# Patient Record
Sex: FEMALE | Race: WHITE | Employment: OTHER | URBAN - METROPOLITAN AREA
[De-identification: names, ages, dates, MRNs, and addresses within clinical notes are randomized per-mention and may not be internally consistent; named-entity substitution may affect disease eponyms.]

---

## 2018-03-15 LAB
ALBUMIN SERPL-MCNC: NORMAL G/DL
ALP BLD-CCNC: NORMAL U/L
ALT SERPL-CCNC: NORMAL U/L
ANION GAP SERPL CALCULATED.3IONS-SCNC: NORMAL MMOL/L
AST SERPL-CCNC: NORMAL U/L
BILIRUB SERPL-MCNC: NORMAL MG/DL (ref 0.1–1.4)
BUN BLDV-MCNC: NORMAL MG/DL
CALCIUM SERPL-MCNC: NORMAL MG/DL
CHLORIDE BLD-SCNC: NORMAL MMOL/L
CHOLESTEROL, TOTAL: 2127 MG/DL
CHOLESTEROL/HDL RATIO: 4
CO2: NORMAL MMOL/L
CREAT SERPL-MCNC: NORMAL MG/DL
GFR CALCULATED: NORMAL
GLUCOSE BLD-MCNC: 103 MG/DL
HDLC SERPL-MCNC: 32 MG/DL (ref 35–70)
LDL CHOLESTEROL CALCULATED: 77 MG/DL (ref 0–160)
POTASSIUM SERPL-SCNC: NORMAL MMOL/L
SODIUM BLD-SCNC: NORMAL MMOL/L
TOTAL PROTEIN: NORMAL
TRIGL SERPL-MCNC: 95 MG/DL
VLDLC SERPL CALC-MCNC: ABNORMAL MG/DL

## 2018-03-21 ENCOUNTER — OFFICE VISIT (OUTPATIENT)
Dept: FAMILY MEDICINE CLINIC | Age: 78
End: 2018-03-21
Payer: MEDICARE

## 2018-03-21 VITALS
BODY MASS INDEX: 23.39 KG/M2 | RESPIRATION RATE: 16 BRPM | SYSTOLIC BLOOD PRESSURE: 128 MMHG | TEMPERATURE: 98.4 F | OXYGEN SATURATION: 96 % | DIASTOLIC BLOOD PRESSURE: 80 MMHG | HEIGHT: 63 IN | WEIGHT: 132 LBS | HEART RATE: 81 BPM

## 2018-03-21 DIAGNOSIS — F32.A DEPRESSION, UNSPECIFIED DEPRESSION TYPE: ICD-10-CM

## 2018-03-21 DIAGNOSIS — F32.9 REACTIVE DEPRESSION: ICD-10-CM

## 2018-03-21 DIAGNOSIS — F33.41 MAJOR DEPRESSIVE DISORDER, RECURRENT, IN PARTIAL REMISSION (HCC): ICD-10-CM

## 2018-03-21 DIAGNOSIS — E03.9 ACQUIRED HYPOTHYROIDISM: Primary | ICD-10-CM

## 2018-03-21 DIAGNOSIS — F41.9 ANXIETY: ICD-10-CM

## 2018-03-21 DIAGNOSIS — E78.5 HYPERLIPIDEMIA, UNSPECIFIED HYPERLIPIDEMIA TYPE: ICD-10-CM

## 2018-03-21 PROCEDURE — 1123F ACP DISCUSS/DSCN MKR DOCD: CPT | Performed by: FAMILY MEDICINE

## 2018-03-21 PROCEDURE — G8427 DOCREV CUR MEDS BY ELIG CLIN: HCPCS | Performed by: FAMILY MEDICINE

## 2018-03-21 PROCEDURE — 4040F PNEUMOC VAC/ADMIN/RCVD: CPT | Performed by: FAMILY MEDICINE

## 2018-03-21 PROCEDURE — G8400 PT W/DXA NO RESULTS DOC: HCPCS | Performed by: FAMILY MEDICINE

## 2018-03-21 PROCEDURE — 1090F PRES/ABSN URINE INCON ASSESS: CPT | Performed by: FAMILY MEDICINE

## 2018-03-21 PROCEDURE — 1036F TOBACCO NON-USER: CPT | Performed by: FAMILY MEDICINE

## 2018-03-21 PROCEDURE — G8482 FLU IMMUNIZE ORDER/ADMIN: HCPCS | Performed by: FAMILY MEDICINE

## 2018-03-21 PROCEDURE — 99214 OFFICE O/P EST MOD 30 MIN: CPT | Performed by: FAMILY MEDICINE

## 2018-03-21 PROCEDURE — G8420 CALC BMI NORM PARAMETERS: HCPCS | Performed by: FAMILY MEDICINE

## 2018-03-21 RX ORDER — CITALOPRAM 40 MG/1
40 TABLET ORAL DAILY
Qty: 90 TABLET | Refills: 1 | Status: SHIPPED | OUTPATIENT
Start: 2018-03-21 | End: 2018-09-12 | Stop reason: SDUPTHER

## 2018-03-21 RX ORDER — LEVOTHYROXINE SODIUM 0.03 MG/1
25 TABLET ORAL DAILY
Qty: 90 TABLET | Refills: 1 | Status: SHIPPED | OUTPATIENT
Start: 2018-03-21 | End: 2018-09-12 | Stop reason: SDUPTHER

## 2018-03-21 RX ORDER — ATORVASTATIN CALCIUM 20 MG/1
20 TABLET, FILM COATED ORAL NIGHTLY
Qty: 90 TABLET | Refills: 1 | Status: SHIPPED | OUTPATIENT
Start: 2018-03-21 | End: 2018-09-12 | Stop reason: SDUPTHER

## 2018-03-21 RX ORDER — ALPRAZOLAM 0.25 MG/1
0.25 TABLET ORAL 2 TIMES DAILY
Qty: 60 TABLET | Refills: 5 | Status: SHIPPED | OUTPATIENT
Start: 2018-03-21 | End: 2018-09-12 | Stop reason: SDUPTHER

## 2018-03-21 ASSESSMENT — PATIENT HEALTH QUESTIONNAIRE - PHQ9
SUM OF ALL RESPONSES TO PHQ9 QUESTIONS 1 & 2: 1
SUM OF ALL RESPONSES TO PHQ QUESTIONS 1-9: 1
1. LITTLE INTEREST OR PLEASURE IN DOING THINGS: 0
2. FEELING DOWN, DEPRESSED OR HOPELESS: 1

## 2018-03-23 DIAGNOSIS — E78.5 HYPERLIPIDEMIA, UNSPECIFIED HYPERLIPIDEMIA TYPE: ICD-10-CM

## 2018-03-23 DIAGNOSIS — E03.9 ACQUIRED HYPOTHYROIDISM: ICD-10-CM

## 2018-09-11 LAB
ALBUMIN SERPL-MCNC: 4.2 G/DL
ALP BLD-CCNC: 75 U/L
ALT SERPL-CCNC: 8 U/L
ANION GAP SERPL CALCULATED.3IONS-SCNC: NORMAL MMOL/L
AST SERPL-CCNC: 14 U/L
BASOPHILS ABSOLUTE: NORMAL /ΜL
BASOPHILS RELATIVE PERCENT: NORMAL %
BILIRUB SERPL-MCNC: 0.7 MG/DL (ref 0.1–1.4)
BUN BLDV-MCNC: 14 MG/DL
CALCIUM SERPL-MCNC: 9.1 MG/DL
CHLORIDE BLD-SCNC: 104 MMOL/L
CHOLESTEROL, TOTAL: 129 MG/DL
CHOLESTEROL/HDL RATIO: 3.9
CO2: 30 MMOL/L
CREAT SERPL-MCNC: 0.79 MG/DL
EOSINOPHILS ABSOLUTE: NORMAL /ΜL
EOSINOPHILS RELATIVE PERCENT: NORMAL %
GFR CALCULATED: NORMAL
GLUCOSE BLD-MCNC: 103 MG/DL
HCT VFR BLD CALC: 39.3 % (ref 36–46)
HDLC SERPL-MCNC: 33 MG/DL (ref 35–70)
HEMOGLOBIN: 13.5 G/DL (ref 12–16)
LDL CHOLESTEROL CALCULATED: 77 MG/DL (ref 0–160)
LYMPHOCYTES ABSOLUTE: NORMAL /ΜL
LYMPHOCYTES RELATIVE PERCENT: NORMAL %
MCH RBC QN AUTO: 27.1 PG
MCHC RBC AUTO-ENTMCNC: 34.3 G/DL
MCV RBC AUTO: 79.2 FL
MONOCYTES ABSOLUTE: NORMAL /ΜL
MONOCYTES RELATIVE PERCENT: NORMAL %
NEUTROPHILS ABSOLUTE: NORMAL /ΜL
NEUTROPHILS RELATIVE PERCENT: NORMAL %
PDW BLD-RTO: 15.3 %
PLATELET # BLD: 181 K/ΜL
PMV BLD AUTO: 9.2 FL
POTASSIUM SERPL-SCNC: 4.3 MMOL/L
RBC # BLD: 4.96 10^6/ΜL
SODIUM BLD-SCNC: 140 MMOL/L
TOTAL PROTEIN: 6.4
TRIGL SERPL-MCNC: 100 MG/DL
TSH SERPL DL<=0.05 MIU/L-ACNC: 2.25 UIU/ML
VLDLC SERPL CALC-MCNC: ABNORMAL MG/DL
WBC # BLD: 5.8 10^3/ML

## 2018-09-12 ENCOUNTER — OFFICE VISIT (OUTPATIENT)
Dept: FAMILY MEDICINE CLINIC | Age: 78
End: 2018-09-12
Payer: MEDICARE

## 2018-09-12 VITALS
HEIGHT: 63 IN | TEMPERATURE: 98.1 F | RESPIRATION RATE: 16 BRPM | BODY MASS INDEX: 23.74 KG/M2 | DIASTOLIC BLOOD PRESSURE: 80 MMHG | OXYGEN SATURATION: 93 % | HEART RATE: 64 BPM | SYSTOLIC BLOOD PRESSURE: 120 MMHG | WEIGHT: 134 LBS

## 2018-09-12 DIAGNOSIS — Z23 NEED FOR VACCINATION: Primary | ICD-10-CM

## 2018-09-12 DIAGNOSIS — Z00.00 ROUTINE GENERAL MEDICAL EXAMINATION AT A HEALTH CARE FACILITY: ICD-10-CM

## 2018-09-12 DIAGNOSIS — E03.9 ACQUIRED HYPOTHYROIDISM: ICD-10-CM

## 2018-09-12 DIAGNOSIS — F32.A DEPRESSION, UNSPECIFIED DEPRESSION TYPE: ICD-10-CM

## 2018-09-12 DIAGNOSIS — E78.5 HYPERLIPIDEMIA, UNSPECIFIED HYPERLIPIDEMIA TYPE: ICD-10-CM

## 2018-09-12 DIAGNOSIS — F41.9 ANXIETY: ICD-10-CM

## 2018-09-12 PROCEDURE — 90662 IIV NO PRSV INCREASED AG IM: CPT | Performed by: FAMILY MEDICINE

## 2018-09-12 PROCEDURE — G0439 PPPS, SUBSEQ VISIT: HCPCS | Performed by: PHYSICIAN ASSISTANT

## 2018-09-12 PROCEDURE — 4040F PNEUMOC VAC/ADMIN/RCVD: CPT | Performed by: PHYSICIAN ASSISTANT

## 2018-09-12 PROCEDURE — G0008 ADMIN INFLUENZA VIRUS VAC: HCPCS | Performed by: FAMILY MEDICINE

## 2018-09-12 PROCEDURE — 4040F PNEUMOC VAC/ADMIN/RCVD: CPT | Performed by: FAMILY MEDICINE

## 2018-09-12 PROCEDURE — G0444 DEPRESSION SCREEN ANNUAL: HCPCS | Performed by: FAMILY MEDICINE

## 2018-09-12 RX ORDER — CITALOPRAM 40 MG/1
40 TABLET ORAL DAILY
Qty: 90 TABLET | Refills: 1 | Status: SHIPPED | OUTPATIENT
Start: 2018-09-12 | End: 2019-03-12 | Stop reason: SDUPTHER

## 2018-09-12 RX ORDER — ALPRAZOLAM 0.25 MG/1
0.25 TABLET ORAL 2 TIMES DAILY
Qty: 60 TABLET | Refills: 0 | Status: SHIPPED | OUTPATIENT
Start: 2018-09-12 | End: 2018-09-12

## 2018-09-12 RX ORDER — ALPRAZOLAM 0.25 MG/1
TABLET ORAL
COMMUNITY
Start: 2018-09-01 | End: 2018-09-12 | Stop reason: SDUPTHER

## 2018-09-12 RX ORDER — ATORVASTATIN CALCIUM 20 MG/1
20 TABLET, FILM COATED ORAL NIGHTLY
Qty: 90 TABLET | Refills: 1 | Status: SHIPPED | OUTPATIENT
Start: 2018-09-12 | End: 2019-03-12 | Stop reason: SDUPTHER

## 2018-09-12 RX ORDER — ALPRAZOLAM 0.25 MG/1
0.25 TABLET ORAL 2 TIMES DAILY
Qty: 60 TABLET | Refills: 5 | Status: SHIPPED | OUTPATIENT
Start: 2018-09-12 | End: 2018-10-18 | Stop reason: SDUPTHER

## 2018-09-12 RX ORDER — LEVOTHYROXINE SODIUM 0.03 MG/1
25 TABLET ORAL DAILY
Qty: 90 TABLET | Refills: 1 | Status: SHIPPED | OUTPATIENT
Start: 2018-09-12 | End: 2019-03-12 | Stop reason: SDUPTHER

## 2018-09-12 ASSESSMENT — PATIENT HEALTH QUESTIONNAIRE - PHQ9
SUM OF ALL RESPONSES TO PHQ QUESTIONS 1-9: 1
SUM OF ALL RESPONSES TO PHQ QUESTIONS 1-9: 3

## 2018-09-12 ASSESSMENT — LIFESTYLE VARIABLES: HOW OFTEN DO YOU HAVE A DRINK CONTAINING ALCOHOL: 0

## 2018-09-12 ASSESSMENT — ANXIETY QUESTIONNAIRES: GAD7 TOTAL SCORE: 1

## 2018-09-12 NOTE — PROGRESS NOTES
Diabetes Father 79       CareTeam (Including outside providers/suppliers regularly involved in providing care):   Patient Care Team:  Timbo Ward MD as PCP - General (Family Medicine)  Timbo Ward MD as Consulting Physician (Family Medicine)    Wt Readings from Last 3 Encounters:   09/12/18 134 lb (60.8 kg)   03/21/18 132 lb (59.9 kg)   09/20/17 131 lb (59.4 kg)     Vitals:    09/12/18 1510   BP: 120/80   Pulse: 64   Resp: 16   Temp: 98.1 °F (36.7 °C)   SpO2: 93%   Weight: 134 lb (60.8 kg)   Height: 5' 3\" (1.6 m)     Body mass index is 23.74 kg/m². General Appearance: alert and oriented to person, place and time, well developed and well- nourished, in no acute distress  Skin: warm and dry, no rash or erythema  Head: normocephalic and atraumatic  Eyes: pupils equal, round, and reactive to light, extraocular eye movements intact, conjunctivae normal  ENT: tympanic membrane, external ear and ear canal normal bilaterally, nose without deformity, nasal mucosa and turbinates normal without polyps  Neck: supple and non-tender without mass, no thyromegaly or thyroid nodules, no cervical lymphadenopathy  Pulmonary/Chest: clear to auscultation bilaterally- no wheezes, rales or rhonchi, normal air movement, no respiratory distress  Cardiovascular: normal rate, regular rhythm, normal S1 and S2, no murmurs, rubs, clicks, or gallops, distal pulses intact, no carotid bruits  Abdomen: soft, non-tender, non-distended, normal bowel sounds, no masses or organomegaly  Extremities: no cyanosis, clubbing or edema  Musculoskeletal: normal range of motion, no joint swelling, deformity or tenderness  Neurologic: reflexes normal and symmetric, no cranial nerve deficit, gait, coordination and speech normal    Patient's complete Health Risk Assessment and screening values have been reviewed and are found in Flowsheets.  The following problems were reviewed today and where indicated follow up appointments were made and/or

## 2018-09-19 DIAGNOSIS — E78.5 HYPERLIPIDEMIA, UNSPECIFIED HYPERLIPIDEMIA TYPE: ICD-10-CM

## 2018-09-19 DIAGNOSIS — E03.9 ACQUIRED HYPOTHYROIDISM: ICD-10-CM

## 2018-10-18 DIAGNOSIS — F41.9 ANXIETY: ICD-10-CM

## 2018-10-19 RX ORDER — ALPRAZOLAM 0.25 MG/1
0.25 TABLET ORAL 2 TIMES DAILY
Qty: 60 TABLET | Refills: 5 | Status: SHIPPED | OUTPATIENT
Start: 2018-10-19 | End: 2018-11-18

## 2018-10-24 NOTE — TELEPHONE ENCOUNTER
The pharmacist called from [address] asking for refills, check with patient to see if she gets it called in or mailed to her
supervision/seated in chair

## 2019-03-07 LAB
ALBUMIN SERPL-MCNC: NORMAL G/DL
ALP BLD-CCNC: NORMAL U/L
ALT SERPL-CCNC: NORMAL U/L
ANION GAP SERPL CALCULATED.3IONS-SCNC: NORMAL MMOL/L
AST SERPL-CCNC: NORMAL U/L
BASOPHILS ABSOLUTE: NORMAL /ΜL
BASOPHILS RELATIVE PERCENT: NORMAL %
BILIRUB SERPL-MCNC: NORMAL MG/DL (ref 0.1–1.4)
BUN BLDV-MCNC: NORMAL MG/DL
CALCIUM SERPL-MCNC: NORMAL MG/DL
CHLORIDE BLD-SCNC: NORMAL MMOL/L
CHOLESTEROL, TOTAL: 134 MG/DL
CHOLESTEROL/HDL RATIO: 4.3
CO2: NORMAL MMOL/L
CREAT SERPL-MCNC: NORMAL MG/DL
EOSINOPHILS ABSOLUTE: NORMAL /ΜL
EOSINOPHILS RELATIVE PERCENT: NORMAL %
GFR CALCULATED: NORMAL
GLUCOSE BLD-MCNC: 109 MG/DL
HCT VFR BLD CALC: 41.2 % (ref 36–46)
HDLC SERPL-MCNC: 31 MG/DL (ref 35–70)
HEMOGLOBIN: 13.6 G/DL (ref 12–16)
LDL CHOLESTEROL CALCULATED: 82 MG/DL (ref 0–160)
LYMPHOCYTES ABSOLUTE: NORMAL /ΜL
LYMPHOCYTES RELATIVE PERCENT: NORMAL %
MCH RBC QN AUTO: NORMAL PG
MCHC RBC AUTO-ENTMCNC: NORMAL G/DL
MCV RBC AUTO: NORMAL FL
MONOCYTES ABSOLUTE: NORMAL /ΜL
MONOCYTES RELATIVE PERCENT: NORMAL %
NEUTROPHILS ABSOLUTE: NORMAL /ΜL
NEUTROPHILS RELATIVE PERCENT: NORMAL %
PDW BLD-RTO: NORMAL %
PLATELET # BLD: NORMAL K/ΜL
PMV BLD AUTO: NORMAL FL
POTASSIUM SERPL-SCNC: NORMAL MMOL/L
RBC # BLD: NORMAL 10^6/ΜL
SODIUM BLD-SCNC: NORMAL MMOL/L
TOTAL PROTEIN: NORMAL
TRIGL SERPL-MCNC: 109 MG/DL
TSH SERPL DL<=0.05 MIU/L-ACNC: 3.03 UIU/ML
VLDLC SERPL CALC-MCNC: ABNORMAL MG/DL
WBC # BLD: NORMAL 10^3/ML

## 2019-03-12 ENCOUNTER — OFFICE VISIT (OUTPATIENT)
Dept: FAMILY MEDICINE CLINIC | Age: 79
End: 2019-03-12
Payer: MEDICARE

## 2019-03-12 VITALS
WEIGHT: 135.8 LBS | DIASTOLIC BLOOD PRESSURE: 62 MMHG | OXYGEN SATURATION: 97 % | SYSTOLIC BLOOD PRESSURE: 110 MMHG | RESPIRATION RATE: 16 BRPM | HEART RATE: 86 BPM | BODY MASS INDEX: 24.99 KG/M2 | HEIGHT: 62 IN | TEMPERATURE: 97.6 F

## 2019-03-12 DIAGNOSIS — F41.9 ANXIETY: ICD-10-CM

## 2019-03-12 DIAGNOSIS — E78.5 HYPERLIPIDEMIA, UNSPECIFIED HYPERLIPIDEMIA TYPE: ICD-10-CM

## 2019-03-12 DIAGNOSIS — E03.9 ACQUIRED HYPOTHYROIDISM: Primary | ICD-10-CM

## 2019-03-12 DIAGNOSIS — L82.1 SEBORRHEIC KERATOSIS: ICD-10-CM

## 2019-03-12 DIAGNOSIS — F32.A DEPRESSION, UNSPECIFIED DEPRESSION TYPE: ICD-10-CM

## 2019-03-12 PROCEDURE — G8427 DOCREV CUR MEDS BY ELIG CLIN: HCPCS | Performed by: FAMILY MEDICINE

## 2019-03-12 PROCEDURE — G8482 FLU IMMUNIZE ORDER/ADMIN: HCPCS | Performed by: FAMILY MEDICINE

## 2019-03-12 PROCEDURE — G8420 CALC BMI NORM PARAMETERS: HCPCS | Performed by: FAMILY MEDICINE

## 2019-03-12 PROCEDURE — 4040F PNEUMOC VAC/ADMIN/RCVD: CPT | Performed by: FAMILY MEDICINE

## 2019-03-12 PROCEDURE — 1123F ACP DISCUSS/DSCN MKR DOCD: CPT | Performed by: FAMILY MEDICINE

## 2019-03-12 PROCEDURE — G8400 PT W/DXA NO RESULTS DOC: HCPCS | Performed by: FAMILY MEDICINE

## 2019-03-12 PROCEDURE — 99214 OFFICE O/P EST MOD 30 MIN: CPT | Performed by: FAMILY MEDICINE

## 2019-03-12 PROCEDURE — 1036F TOBACCO NON-USER: CPT | Performed by: FAMILY MEDICINE

## 2019-03-12 PROCEDURE — 1101F PT FALLS ASSESS-DOCD LE1/YR: CPT | Performed by: FAMILY MEDICINE

## 2019-03-12 PROCEDURE — 1090F PRES/ABSN URINE INCON ASSESS: CPT | Performed by: FAMILY MEDICINE

## 2019-03-12 RX ORDER — IBUPROFEN 600 MG/1
600 TABLET ORAL EVERY 6 HOURS PRN
Qty: 60 TABLET | Refills: 1 | Status: SHIPPED
Start: 2019-03-12 | End: 2021-01-01

## 2019-03-12 RX ORDER — LEVOTHYROXINE SODIUM 0.03 MG/1
25 TABLET ORAL DAILY
Qty: 90 TABLET | Refills: 1 | Status: SHIPPED | OUTPATIENT
Start: 2019-03-12 | End: 2019-10-07 | Stop reason: SDUPTHER

## 2019-03-12 RX ORDER — ATORVASTATIN CALCIUM 20 MG/1
20 TABLET, FILM COATED ORAL NIGHTLY
Qty: 90 TABLET | Refills: 1 | Status: SHIPPED | OUTPATIENT
Start: 2019-03-12 | End: 2019-10-07 | Stop reason: SDUPTHER

## 2019-03-12 RX ORDER — ALPRAZOLAM 0.25 MG/1
0.25 TABLET ORAL
COMMUNITY
Start: 2019-01-04 | End: 2019-03-12 | Stop reason: SDUPTHER

## 2019-03-12 RX ORDER — CITALOPRAM 40 MG/1
40 TABLET ORAL DAILY
Qty: 90 TABLET | Refills: 1 | Status: SHIPPED | OUTPATIENT
Start: 2019-03-12 | End: 2019-10-07 | Stop reason: SDUPTHER

## 2019-03-12 RX ORDER — ALPRAZOLAM 0.25 MG/1
0.25 TABLET ORAL 2 TIMES DAILY
Qty: 60 TABLET | Refills: 2 | Status: SHIPPED | OUTPATIENT
Start: 2019-03-12 | End: 2019-10-07 | Stop reason: SDUPTHER

## 2019-04-11 DIAGNOSIS — F32.A DEPRESSION, UNSPECIFIED DEPRESSION TYPE: ICD-10-CM

## 2019-04-11 DIAGNOSIS — E03.9 ACQUIRED HYPOTHYROIDISM: ICD-10-CM

## 2019-04-11 DIAGNOSIS — E78.5 HYPERLIPIDEMIA, UNSPECIFIED HYPERLIPIDEMIA TYPE: ICD-10-CM

## 2019-09-16 ENCOUNTER — TELEPHONE (OUTPATIENT)
Dept: FAMILY MEDICINE CLINIC | Age: 79
End: 2019-09-16

## 2019-09-16 DIAGNOSIS — E78.5 HYPERLIPIDEMIA, UNSPECIFIED HYPERLIPIDEMIA TYPE: ICD-10-CM

## 2019-09-16 DIAGNOSIS — E03.9 ACQUIRED HYPOTHYROIDISM: Primary | ICD-10-CM

## 2019-10-02 LAB
ALBUMIN SERPL-MCNC: NORMAL G/DL
ALP BLD-CCNC: NORMAL U/L
ALT SERPL-CCNC: NORMAL U/L
ANION GAP SERPL CALCULATED.3IONS-SCNC: NORMAL MMOL/L
AST SERPL-CCNC: NORMAL U/L
BASOPHILS ABSOLUTE: NORMAL /ΜL
BASOPHILS RELATIVE PERCENT: NORMAL %
BILIRUB SERPL-MCNC: NORMAL MG/DL (ref 0.1–1.4)
BUN BLDV-MCNC: NORMAL MG/DL
CALCIUM SERPL-MCNC: NORMAL MG/DL
CHLORIDE BLD-SCNC: NORMAL MMOL/L
CHOLESTEROL, TOTAL: 135 MG/DL
CHOLESTEROL/HDL RATIO: 4
CO2: NORMAL MMOL/L
CREAT SERPL-MCNC: NORMAL MG/DL
EOSINOPHILS ABSOLUTE: NORMAL /ΜL
EOSINOPHILS RELATIVE PERCENT: NORMAL %
GFR CALCULATED: NORMAL
GLUCOSE BLD-MCNC: 105 MG/DL
HCT VFR BLD CALC: 41.2 % (ref 36–46)
HDLC SERPL-MCNC: 34 MG/DL (ref 35–70)
HEMOGLOBIN: 13.3 G/DL (ref 12–16)
LDL CHOLESTEROL CALCULATED: 80 MG/DL (ref 0–160)
LYMPHOCYTES ABSOLUTE: NORMAL /ΜL
LYMPHOCYTES RELATIVE PERCENT: NORMAL %
MCH RBC QN AUTO: NORMAL PG
MCHC RBC AUTO-ENTMCNC: NORMAL G/DL
MCV RBC AUTO: NORMAL FL
MONOCYTES ABSOLUTE: NORMAL /ΜL
MONOCYTES RELATIVE PERCENT: NORMAL %
NEUTROPHILS ABSOLUTE: NORMAL /ΜL
NEUTROPHILS RELATIVE PERCENT: NORMAL %
PDW BLD-RTO: NORMAL %
PLATELET # BLD: NORMAL K/ΜL
PMV BLD AUTO: NORMAL FL
POTASSIUM SERPL-SCNC: NORMAL MMOL/L
RBC # BLD: NORMAL 10^6/ΜL
SODIUM BLD-SCNC: NORMAL MMOL/L
TOTAL PROTEIN: NORMAL
TRIGL SERPL-MCNC: 113 MG/DL
TSH SERPL DL<=0.05 MIU/L-ACNC: 2.59 UIU/ML
VLDLC SERPL CALC-MCNC: ABNORMAL MG/DL
WBC # BLD: NORMAL 10^3/ML

## 2019-10-07 ENCOUNTER — OFFICE VISIT (OUTPATIENT)
Dept: FAMILY MEDICINE CLINIC | Age: 79
End: 2019-10-07
Payer: MEDICARE

## 2019-10-07 VITALS
OXYGEN SATURATION: 96 % | BODY MASS INDEX: 23.96 KG/M2 | HEIGHT: 63 IN | HEART RATE: 84 BPM | SYSTOLIC BLOOD PRESSURE: 130 MMHG | TEMPERATURE: 98.4 F | DIASTOLIC BLOOD PRESSURE: 88 MMHG | RESPIRATION RATE: 18 BRPM | WEIGHT: 135.2 LBS

## 2019-10-07 DIAGNOSIS — F41.9 ANXIETY: ICD-10-CM

## 2019-10-07 DIAGNOSIS — E78.5 HYPERLIPIDEMIA, UNSPECIFIED HYPERLIPIDEMIA TYPE: ICD-10-CM

## 2019-10-07 DIAGNOSIS — F43.21 GRIEF REACTION: ICD-10-CM

## 2019-10-07 DIAGNOSIS — E03.9 ACQUIRED HYPOTHYROIDISM: ICD-10-CM

## 2019-10-07 DIAGNOSIS — F32.A DEPRESSION, UNSPECIFIED DEPRESSION TYPE: ICD-10-CM

## 2019-10-07 DIAGNOSIS — Z00.00 ROUTINE GENERAL MEDICAL EXAMINATION AT A HEALTH CARE FACILITY: Primary | ICD-10-CM

## 2019-10-07 PROCEDURE — 90653 IIV ADJUVANT VACCINE IM: CPT | Performed by: FAMILY MEDICINE

## 2019-10-07 PROCEDURE — G0008 ADMIN INFLUENZA VIRUS VAC: HCPCS | Performed by: FAMILY MEDICINE

## 2019-10-07 PROCEDURE — G0439 PPPS, SUBSEQ VISIT: HCPCS | Performed by: FAMILY MEDICINE

## 2019-10-07 PROCEDURE — 4040F PNEUMOC VAC/ADMIN/RCVD: CPT | Performed by: FAMILY MEDICINE

## 2019-10-07 PROCEDURE — 1123F ACP DISCUSS/DSCN MKR DOCD: CPT | Performed by: FAMILY MEDICINE

## 2019-10-07 PROCEDURE — G8482 FLU IMMUNIZE ORDER/ADMIN: HCPCS | Performed by: FAMILY MEDICINE

## 2019-10-07 RX ORDER — CITALOPRAM 40 MG/1
40 TABLET ORAL DAILY
Qty: 90 TABLET | Refills: 1 | Status: SHIPPED
Start: 2019-10-07 | End: 2020-04-06 | Stop reason: SDUPTHER

## 2019-10-07 RX ORDER — LEVOTHYROXINE SODIUM 0.03 MG/1
25 TABLET ORAL DAILY
Qty: 90 TABLET | Refills: 1 | Status: SHIPPED
Start: 2019-10-07 | End: 2020-04-06 | Stop reason: SDUPTHER

## 2019-10-07 RX ORDER — ATORVASTATIN CALCIUM 20 MG/1
20 TABLET, FILM COATED ORAL NIGHTLY
Qty: 90 TABLET | Refills: 1 | Status: SHIPPED
Start: 2019-10-07 | End: 2020-04-06 | Stop reason: SDUPTHER

## 2019-10-07 RX ORDER — ALPRAZOLAM 0.25 MG/1
0.25 TABLET ORAL 2 TIMES DAILY
Qty: 60 TABLET | Refills: 2 | Status: SHIPPED | OUTPATIENT
Start: 2019-10-07 | End: 2020-04-06 | Stop reason: SDUPTHER

## 2019-10-07 ASSESSMENT — PATIENT HEALTH QUESTIONNAIRE - PHQ9
SUM OF ALL RESPONSES TO PHQ QUESTIONS 1-9: 2
SUM OF ALL RESPONSES TO PHQ QUESTIONS 1-9: 2

## 2019-10-07 ASSESSMENT — LIFESTYLE VARIABLES: HOW OFTEN DO YOU HAVE A DRINK CONTAINING ALCOHOL: 0

## 2019-10-17 DIAGNOSIS — E03.9 ACQUIRED HYPOTHYROIDISM: ICD-10-CM

## 2019-10-17 DIAGNOSIS — E78.5 HYPERLIPIDEMIA, UNSPECIFIED HYPERLIPIDEMIA TYPE: ICD-10-CM

## 2020-04-06 RX ORDER — ALPRAZOLAM 0.25 MG/1
0.25 TABLET ORAL 2 TIMES DAILY
Qty: 60 TABLET | Refills: 2 | Status: SHIPPED | OUTPATIENT
Start: 2020-04-06 | End: 2020-04-21 | Stop reason: SDUPTHER

## 2020-04-06 RX ORDER — LEVOTHYROXINE SODIUM 0.03 MG/1
25 TABLET ORAL DAILY
Qty: 90 TABLET | Refills: 1 | Status: SHIPPED
Start: 2020-04-06 | End: 2020-10-12 | Stop reason: SDUPTHER

## 2020-04-06 RX ORDER — ATORVASTATIN CALCIUM 20 MG/1
20 TABLET, FILM COATED ORAL NIGHTLY
Qty: 90 TABLET | Refills: 1 | Status: SHIPPED
Start: 2020-04-06 | End: 2020-10-12 | Stop reason: SDUPTHER

## 2020-04-06 RX ORDER — CITALOPRAM 40 MG/1
40 TABLET ORAL DAILY
Qty: 90 TABLET | Refills: 1 | Status: SHIPPED
Start: 2020-04-06 | End: 2020-10-12 | Stop reason: SDUPTHER

## 2020-04-21 RX ORDER — ALPRAZOLAM 0.25 MG/1
0.25 TABLET ORAL 2 TIMES DAILY
Qty: 60 TABLET | Refills: 2 | Status: SHIPPED
Start: 2020-04-21 | End: 2020-10-12 | Stop reason: SDUPTHER

## 2020-04-21 NOTE — TELEPHONE ENCOUNTER
Refill request for Xanax, pt did not get previous script. Tasia Halo it was printed by [facility] on 4/6/20.

## 2020-10-06 ENCOUNTER — TELEPHONE (OUTPATIENT)
Dept: FAMILY MEDICINE CLINIC | Age: 80
End: 2020-10-06

## 2020-10-07 LAB
ALBUMIN SERPL-MCNC: NORMAL G/DL
ALP BLD-CCNC: NORMAL U/L
ALT SERPL-CCNC: NORMAL U/L
ANION GAP SERPL CALCULATED.3IONS-SCNC: NORMAL MMOL/L
AST SERPL-CCNC: NORMAL U/L
BASOPHILS ABSOLUTE: NORMAL
BASOPHILS RELATIVE PERCENT: NORMAL
BILIRUB SERPL-MCNC: NORMAL MG/DL
BUN BLDV-MCNC: NORMAL MG/DL
CALCIUM SERPL-MCNC: NORMAL MG/DL
CHLORIDE BLD-SCNC: NORMAL MMOL/L
CHOLESTEROL, TOTAL: 110 MG/DL
CHOLESTEROL/HDL RATIO: 3.5
CO2: NORMAL
CREAT SERPL-MCNC: NORMAL MG/DL
EOSINOPHILS ABSOLUTE: NORMAL
EOSINOPHILS RELATIVE PERCENT: NORMAL
GFR CALCULATED: NORMAL
GLUCOSE BLD-MCNC: NORMAL MG/DL
HCT VFR BLD CALC: NORMAL %
HDLC SERPL-MCNC: 31 MG/DL (ref 35–70)
HEMOGLOBIN: NORMAL
LDL CHOLESTEROL CALCULATED: 61 MG/DL (ref 0–160)
LYMPHOCYTES ABSOLUTE: NORMAL
LYMPHOCYTES RELATIVE PERCENT: NORMAL
MCH RBC QN AUTO: NORMAL PG
MCHC RBC AUTO-ENTMCNC: NORMAL G/DL
MCV RBC AUTO: NORMAL FL
MONOCYTES ABSOLUTE: NORMAL
MONOCYTES RELATIVE PERCENT: NORMAL
NEUTROPHILS ABSOLUTE: NORMAL
NEUTROPHILS RELATIVE PERCENT: NORMAL
NONHDLC SERPL-MCNC: 79 MG/DL
PDW BLD-RTO: NORMAL %
PLATELET # BLD: NORMAL 10*3/UL
PMV BLD AUTO: NORMAL FL
POTASSIUM SERPL-SCNC: NORMAL MMOL/L
RBC # BLD: NORMAL 10*6/UL
SODIUM BLD-SCNC: NORMAL MMOL/L
TOTAL PROTEIN: NORMAL
TRIGL SERPL-MCNC: 101 MG/DL
TSH SERPL DL<=0.05 MIU/L-ACNC: NORMAL M[IU]/L
VLDLC SERPL CALC-MCNC: ABNORMAL MG/DL
WBC # BLD: NORMAL 10*3/UL

## 2020-10-12 ENCOUNTER — OFFICE VISIT (OUTPATIENT)
Dept: FAMILY MEDICINE CLINIC | Age: 80
End: 2020-10-12
Payer: MEDICARE

## 2020-10-12 VITALS
HEIGHT: 60 IN | BODY MASS INDEX: 27.68 KG/M2 | RESPIRATION RATE: 18 BRPM | HEART RATE: 80 BPM | OXYGEN SATURATION: 95 % | DIASTOLIC BLOOD PRESSURE: 80 MMHG | SYSTOLIC BLOOD PRESSURE: 120 MMHG | WEIGHT: 141 LBS | TEMPERATURE: 97.2 F

## 2020-10-12 PROCEDURE — G0008 ADMIN INFLUENZA VIRUS VAC: HCPCS | Performed by: FAMILY MEDICINE

## 2020-10-12 PROCEDURE — 1123F ACP DISCUSS/DSCN MKR DOCD: CPT | Performed by: FAMILY MEDICINE

## 2020-10-12 PROCEDURE — 90694 VACC AIIV4 NO PRSRV 0.5ML IM: CPT | Performed by: FAMILY MEDICINE

## 2020-10-12 PROCEDURE — G0439 PPPS, SUBSEQ VISIT: HCPCS | Performed by: FAMILY MEDICINE

## 2020-10-12 PROCEDURE — G8484 FLU IMMUNIZE NO ADMIN: HCPCS | Performed by: FAMILY MEDICINE

## 2020-10-12 PROCEDURE — 4040F PNEUMOC VAC/ADMIN/RCVD: CPT | Performed by: FAMILY MEDICINE

## 2020-10-12 RX ORDER — CITALOPRAM 40 MG/1
40 TABLET ORAL DAILY
Qty: 90 TABLET | Refills: 1 | Status: SHIPPED
Start: 2020-10-12 | End: 2021-01-01 | Stop reason: SDUPTHER

## 2020-10-12 RX ORDER — ALPRAZOLAM 0.25 MG/1
0.25 TABLET ORAL DAILY PRN
Qty: 90 TABLET | Refills: 0 | Status: SHIPPED | OUTPATIENT
Start: 2020-10-12 | End: 2021-01-01 | Stop reason: SDUPTHER

## 2020-10-12 RX ORDER — FAMOTIDINE 40 MG/1
40 TABLET, FILM COATED ORAL EVERY EVENING
Qty: 90 TABLET | Refills: 0 | Status: SHIPPED
Start: 2020-10-12 | End: 2021-01-01 | Stop reason: ALTCHOICE

## 2020-10-12 RX ORDER — LEVOTHYROXINE SODIUM 0.03 MG/1
25 TABLET ORAL DAILY
Qty: 90 TABLET | Refills: 1 | Status: SHIPPED
Start: 2020-10-12 | End: 2021-01-01 | Stop reason: SDUPTHER

## 2020-10-12 RX ORDER — ATORVASTATIN CALCIUM 20 MG/1
20 TABLET, FILM COATED ORAL NIGHTLY
Qty: 90 TABLET | Refills: 1 | Status: SHIPPED
Start: 2020-10-12 | End: 2021-01-01 | Stop reason: SDUPTHER

## 2020-10-12 ASSESSMENT — LIFESTYLE VARIABLES
AUDIT TOTAL SCORE: INCOMPLETE
AUDIT-C TOTAL SCORE: INCOMPLETE
HOW OFTEN DO YOU HAVE A DRINK CONTAINING ALCOHOL: 0
HOW OFTEN DO YOU HAVE A DRINK CONTAINING ALCOHOL: NEVER

## 2020-10-12 ASSESSMENT — PATIENT HEALTH QUESTIONNAIRE - PHQ9
2. FEELING DOWN, DEPRESSED OR HOPELESS: 0
SUM OF ALL RESPONSES TO PHQ9 QUESTIONS 1 & 2: 1
1. LITTLE INTEREST OR PLEASURE IN DOING THINGS: 1
SUM OF ALL RESPONSES TO PHQ QUESTIONS 1-9: 1
SUM OF ALL RESPONSES TO PHQ QUESTIONS 1-9: 1

## 2020-10-12 NOTE — PROGRESS NOTES
Vaccine Information Sheet, \"Influenza - Inactivated\"  given to Vermell Hatchet, or parent/legal guardian of  Vermell Hatchet and verbalized understanding. Patient responses:    Have you ever had a reaction to a flu vaccine? No  Do you have any current illness? No  Have you ever had [place] Syndrome? No  Do you have a serious allergy to any of the follow: Neomycin, Polymyxin, Thimerosal, eggs or egg products? No    Flu vaccine given per order. Please see immunization tab. Risks and benefits explained. Current VIS given.         2

## 2020-10-12 NOTE — PATIENT INSTRUCTIONS
Personalized Preventive Plan for Abram Gan - 10/12/2020  Medicare offers a range of preventive health benefits. Some of the tests and screenings are paid in full while other may be subject to a deductible, co-insurance, and/or copay. Some of these benefits include a comprehensive review of your medical history including lifestyle, illnesses that may run in your family, and various assessments and screenings as appropriate. After reviewing your medical record and screening and assessments performed today your provider may have ordered immunizations, labs, imaging, and/or referrals for you. A list of these orders (if applicable) as well as your Preventive Care list are included within your After Visit Summary for your review. Other Preventive Recommendations:    · A preventive eye exam performed by an eye specialist is recommended every 1-2 years to screen for glaucoma; cataracts, macular degeneration, and other eye disorders. · A preventive dental visit is recommended every 6 months. · Try to get at least 150 minutes of exercise per week or 10,000 steps per day on a pedometer . · Order or download the FREE \"Exercise & Physical Activity: Your Everyday Guide\" from [employer]. Call 9-129.250.8289 or search [employer] online. · You need 7730-9380 mg of calcium and 4620-0478 IU of vitamin D per day. It is possible to meet your calcium requirement with diet alone, but a vitamin D supplement is usually necessary to meet this goal.  · When exposed to the sun, use a sunscreen that protects against both UVA and UVB radiation with an SPF of 30 or greater. Reapply every 2 to 3 hours or after sweating, drying off with a towel, or swimming. · Always wear a seat belt when traveling in a car. Always wear a helmet when riding a bicycle or motorcycle.

## 2020-10-12 NOTE — PROGRESS NOTES
Medicare Annual Wellness Visit  Name: Keira Montiel Date: 10/12/2020   MRN: [id number] Sex: Female   Age: 79 y.o. Ethnicity: Non-/Non    : 1940 Race: [place] Faith is here for Medicare AWV (Rt hip pain, heartburn, )    Screenings for behavioral, psychosocial and functional/safety risks, and cognitive dysfunction are all negative except as indicated below. These results, as well as other patient data from the [address] form, are documented in Flowsheets linked to this Encounter. Allergies   Allergen Reactions    Duricef [Cefadroxil] Hives         Prior to Visit Medications    Medication Sig Taking? Authorizing Provider   citalopram (CELEXA) 40 MG tablet Take 1 tablet by mouth daily Instructed to take with sip water am of procedure Yes Georgina Patterson MD   levothyroxine (SYNTHROID) 25 MCG tablet Take 1 tablet by mouth Daily Yes Georgina Patterson MD   atorvastatin (LIPITOR) 20 MG tablet Take 1 tablet by mouth nightly Yes Georgina Patterson MD   ibuprofen (ADVIL;MOTRIN) 600 MG tablet Take 1 tablet by mouth every 6 hours as needed for Pain Yes Georgina Patterson MD   aspirin 81 MG tablet Take 81 mg by mouth nightly.  LAST DOSE 13 Yes Historical Provider, MD         Past Medical History:   Diagnosis Date    Anxiety     Back skin lesion     Boil     reomved from right shoulder    Cancer ([address]     RIGHT BREAST    Depression     STABLE ON MEDS    Hyperlipidemia     Hypothyroidism        Past Surgical History:   Procedure Laterality Date    BREAST SURGERY Right     MASTECTOMY    BREAST SURGERY Left     LEFT BREAST REDUCTION    HYSTERECTOMY      OTHER SURGICAL HISTORY  5/10/2013    excision of cyst upper back          Family History   Problem Relation Age of Onset    Heart Disease Mother     Mental Illness Mother     Heart Disease Father     Heart Surgery Father         stents placed    Diabetes Father 80       CareTeam (Including outside providers/suppliers regularly involved in providing care):   Patient Care Team:  Jolene Boykin MD as PCP - General (Family Medicine)  Jolene Boykin MD [facility]  Jolene Boykin MD as Consulting Physician (Family Medicine)    Wt Readings from Last 3 Encounters:   10/12/20 141 lb (64 kg)   10/07/19 135 lb 3.2 oz (61.3 kg)   03/12/19 135 lb 12.8 oz (61.6 kg)     Vitals:    10/12/20 1106   BP: 120/80   Site: Left Upper Arm   Position: Sitting   Cuff Size: Medium Adult   Pulse: 80   Resp: 18   Temp: 97.2 °F (36.2 °C)   TempSrc: Temporal   SpO2: 95%   Weight: 141 lb (64 kg)   Height: 5' (1.524 m)     Body mass index is 27.54 kg/m². Based upon direct observation of the patient, evaluation of cognition reveals recent and remote memory intact.     General Appearance: alert and oriented to person, place and time, well developed and well- nourished, in no acute distress  Skin: warm and dry, no rash or erythema  Head: normocephalic and atraumatic  Eyes: pupils equal, round, and reactive to light, extraocular eye movements intact, conjunctivae normal  ENT: tympanic membrane, external ear and ear canal normal bilaterally, nose without deformity, nasal mucosa and turbinates normal without polyps  Neck: supple and non-tender without mass, no thyromegaly or thyroid nodules, no cervical lymphadenopathy  Pulmonary/Chest: clear to auscultation bilaterally- no wheezes, rales or rhonchi, normal air movement, no respiratory distress  Cardiovascular: normal rate, regular rhythm, normal S1 and S2, no murmurs, rubs, clicks, or gallops, distal pulses intact, no carotid bruits  Abdomen: soft, non-tender, non-distended, normal bowel sounds, no masses or organomegaly  Extremities: no cyanosis, clubbing or edema  Musculoskeletal: normal range of motion, no joint swelling, deformity or tenderness  Neurologic: reflexes normal and symmetric, no cranial nerve deficit, gait, coordination and speech found for: LABA1C  No results found for: EAG      Gastroesophageal reflux disease without esophagitis  Was taking tums  jusband was on omeprazole   Using pecid takes prn  Got rid of milk and this seems better  Spices worse   advise to take daily for 2 mos and recheck     Other orders  -     INFLUENZA, QUADV, ADJUVANTED, 65 YRS =, IM, PF, PREFILL SYR, 0.5ML (FLUAD)

## 2020-10-20 ENCOUNTER — TELEPHONE (OUTPATIENT)
Dept: FAMILY MEDICINE CLINIC | Age: 80
End: 2020-10-20

## 2021-01-01 ENCOUNTER — TELEPHONE (OUTPATIENT)
Dept: VASCULAR SURGERY | Age: 81
End: 2021-01-01

## 2021-01-01 ENCOUNTER — NURSE ONLY (OUTPATIENT)
Dept: FAMILY MEDICINE CLINIC | Age: 81
End: 2021-01-01

## 2021-01-01 ENCOUNTER — APPOINTMENT (OUTPATIENT)
Dept: NUCLEAR MEDICINE | Age: 81
DRG: 436 | End: 2021-01-01
Payer: MEDICARE

## 2021-01-01 ENCOUNTER — APPOINTMENT (OUTPATIENT)
Dept: GENERAL RADIOLOGY | Age: 81
DRG: 177 | End: 2021-01-01
Payer: MEDICARE

## 2021-01-01 ENCOUNTER — TELEPHONE (OUTPATIENT)
Dept: FAMILY MEDICINE CLINIC | Age: 81
End: 2021-01-01

## 2021-01-01 ENCOUNTER — TELEPHONE (OUTPATIENT)
Dept: HEMATOLOGY | Age: 81
End: 2021-01-01

## 2021-01-01 ENCOUNTER — TELEPHONE (OUTPATIENT)
Dept: CARDIOLOGY CLINIC | Age: 81
End: 2021-01-01

## 2021-01-01 ENCOUNTER — APPOINTMENT (OUTPATIENT)
Dept: CT IMAGING | Age: 81
DRG: 436 | End: 2021-01-01
Payer: MEDICARE

## 2021-01-01 ENCOUNTER — HOSPITAL ENCOUNTER (INPATIENT)
Age: 81
LOS: 3 days | Discharge: HOME | DRG: 436 | End: 2021-10-31
Attending: EMERGENCY MEDICINE | Admitting: FAMILY MEDICINE
Payer: MEDICARE

## 2021-01-01 ENCOUNTER — OFFICE VISIT (OUTPATIENT)
Dept: FAMILY MEDICINE CLINIC | Age: 81
End: 2021-01-01
Payer: MEDICARE

## 2021-01-01 ENCOUNTER — HOSPITAL ENCOUNTER (INPATIENT)
Age: 81
LOS: 3 days | Discharge: HOSPICE | DRG: 177 | End: 2021-11-24
Attending: EMERGENCY MEDICINE | Admitting: INTERNAL MEDICINE
Payer: MEDICARE

## 2021-01-01 ENCOUNTER — PATIENT MESSAGE (OUTPATIENT)
Dept: FAMILY MEDICINE CLINIC | Age: 81
End: 2021-01-01

## 2021-01-01 ENCOUNTER — APPOINTMENT (OUTPATIENT)
Dept: CT IMAGING | Age: 81
DRG: 177 | End: 2021-01-01
Payer: MEDICARE

## 2021-01-01 ENCOUNTER — APPOINTMENT (OUTPATIENT)
Dept: NON INVASIVE DIAGNOSTICS | Age: 81
DRG: 436 | End: 2021-01-01
Payer: MEDICARE

## 2021-01-01 ENCOUNTER — IMMUNIZATION (OUTPATIENT)
Dept: PRIMARY CARE CLINIC | Age: 81
End: 2021-01-01
Payer: MEDICARE

## 2021-01-01 ENCOUNTER — APPOINTMENT (OUTPATIENT)
Dept: GENERAL RADIOLOGY | Age: 81
DRG: 436 | End: 2021-01-01
Payer: MEDICARE

## 2021-01-01 VITALS
OXYGEN SATURATION: 98 % | HEART RATE: 65 BPM | TEMPERATURE: 97 F | WEIGHT: 138 LBS | BODY MASS INDEX: 25.24 KG/M2 | DIASTOLIC BLOOD PRESSURE: 70 MMHG | SYSTOLIC BLOOD PRESSURE: 114 MMHG | RESPIRATION RATE: 16 BRPM

## 2021-01-01 VITALS
HEART RATE: 83 BPM | TEMPERATURE: 97.3 F | SYSTOLIC BLOOD PRESSURE: 130 MMHG | RESPIRATION RATE: 16 BRPM | WEIGHT: 144.4 LBS | DIASTOLIC BLOOD PRESSURE: 76 MMHG | BODY MASS INDEX: 26.57 KG/M2 | OXYGEN SATURATION: 96 % | HEIGHT: 62 IN

## 2021-01-01 VITALS
DIASTOLIC BLOOD PRESSURE: 72 MMHG | SYSTOLIC BLOOD PRESSURE: 136 MMHG | WEIGHT: 141.6 LBS | HEIGHT: 62 IN | HEART RATE: 92 BPM | TEMPERATURE: 96.8 F | OXYGEN SATURATION: 94 % | RESPIRATION RATE: 16 BRPM | BODY MASS INDEX: 26.06 KG/M2

## 2021-01-01 VITALS
DIASTOLIC BLOOD PRESSURE: 70 MMHG | WEIGHT: 142.4 LBS | HEIGHT: 62 IN | RESPIRATION RATE: 16 BRPM | SYSTOLIC BLOOD PRESSURE: 118 MMHG | HEART RATE: 84 BPM | TEMPERATURE: 97.3 F | BODY MASS INDEX: 26.2 KG/M2 | OXYGEN SATURATION: 96 %

## 2021-01-01 VITALS
HEART RATE: 78 BPM | WEIGHT: 142.4 LBS | BODY MASS INDEX: 27.81 KG/M2 | RESPIRATION RATE: 16 BRPM | DIASTOLIC BLOOD PRESSURE: 80 MMHG | OXYGEN SATURATION: 98 % | TEMPERATURE: 97.4 F | SYSTOLIC BLOOD PRESSURE: 136 MMHG

## 2021-01-01 VITALS
OXYGEN SATURATION: 94 % | SYSTOLIC BLOOD PRESSURE: 124 MMHG | TEMPERATURE: 97.5 F | DIASTOLIC BLOOD PRESSURE: 70 MMHG | WEIGHT: 144 LBS | BODY MASS INDEX: 26.34 KG/M2 | HEART RATE: 94 BPM

## 2021-01-01 VITALS
RESPIRATION RATE: 24 BRPM | HEART RATE: 56 BPM | BODY MASS INDEX: 25.4 KG/M2 | WEIGHT: 138 LBS | TEMPERATURE: 97.6 F | HEIGHT: 62 IN | DIASTOLIC BLOOD PRESSURE: 53 MMHG | OXYGEN SATURATION: 91 % | SYSTOLIC BLOOD PRESSURE: 121 MMHG

## 2021-01-01 VITALS — SYSTOLIC BLOOD PRESSURE: 114 MMHG | DIASTOLIC BLOOD PRESSURE: 68 MMHG

## 2021-01-01 VITALS
DIASTOLIC BLOOD PRESSURE: 78 MMHG | HEART RATE: 87 BPM | SYSTOLIC BLOOD PRESSURE: 124 MMHG | BODY MASS INDEX: 25.97 KG/M2 | WEIGHT: 142 LBS | TEMPERATURE: 97.5 F | OXYGEN SATURATION: 94 %

## 2021-01-01 VITALS
BODY MASS INDEX: 25.98 KG/M2 | HEIGHT: 62 IN | TEMPERATURE: 99 F | OXYGEN SATURATION: 92 % | WEIGHT: 141.2 LBS | HEART RATE: 54 BPM | DIASTOLIC BLOOD PRESSURE: 75 MMHG | SYSTOLIC BLOOD PRESSURE: 145 MMHG | RESPIRATION RATE: 16 BRPM

## 2021-01-01 DIAGNOSIS — R73.09 ELEVATED GLUCOSE: ICD-10-CM

## 2021-01-01 DIAGNOSIS — K86.89 PANCREATIC MASS: ICD-10-CM

## 2021-01-01 DIAGNOSIS — E03.9 HYPOTHYROIDISM, UNSPECIFIED TYPE: ICD-10-CM

## 2021-01-01 DIAGNOSIS — I82.4Y2 ACUTE DEEP VEIN THROMBOSIS (DVT) OF PROXIMAL VEIN OF LEFT LOWER EXTREMITY (HCC): ICD-10-CM

## 2021-01-01 DIAGNOSIS — F32.9 REACTIVE DEPRESSION: ICD-10-CM

## 2021-01-01 DIAGNOSIS — R16.0 LIVER MASSES: ICD-10-CM

## 2021-01-01 DIAGNOSIS — I80.9 THROMBOPHLEBITIS: Primary | ICD-10-CM

## 2021-01-01 DIAGNOSIS — F41.9 ANXIETY: ICD-10-CM

## 2021-01-01 DIAGNOSIS — Z00.00 ROUTINE GENERAL MEDICAL EXAMINATION AT A HEALTH CARE FACILITY: ICD-10-CM

## 2021-01-01 DIAGNOSIS — R03.0 ELEVATED BLOOD PRESSURE READING: Primary | ICD-10-CM

## 2021-01-01 DIAGNOSIS — E78.5 HYPERLIPIDEMIA, UNSPECIFIED HYPERLIPIDEMIA TYPE: ICD-10-CM

## 2021-01-01 DIAGNOSIS — R03.0 ELEVATED BP WITHOUT DIAGNOSIS OF HYPERTENSION: Primary | ICD-10-CM

## 2021-01-01 DIAGNOSIS — D70.9 NEUTROPENIC FEVER (HCC): ICD-10-CM

## 2021-01-01 DIAGNOSIS — I21.4 NSTEMI (NON-ST ELEVATED MYOCARDIAL INFARCTION) (HCC): Primary | ICD-10-CM

## 2021-01-01 DIAGNOSIS — J96.01 ACUTE RESPIRATORY FAILURE WITH HYPOXIA (HCC): ICD-10-CM

## 2021-01-01 DIAGNOSIS — E78.5 HYPERLIPIDEMIA, UNSPECIFIED HYPERLIPIDEMIA TYPE: Primary | ICD-10-CM

## 2021-01-01 DIAGNOSIS — N17.9 AKI (ACUTE KIDNEY INJURY) (HCC): ICD-10-CM

## 2021-01-01 DIAGNOSIS — F32.A DEPRESSION, UNSPECIFIED DEPRESSION TYPE: ICD-10-CM

## 2021-01-01 DIAGNOSIS — R22.42 LUMP OF SKIN OF LEFT LOWER EXTREMITY: ICD-10-CM

## 2021-01-01 DIAGNOSIS — D69.6 THROMBOCYTOPENIA (HCC): ICD-10-CM

## 2021-01-01 DIAGNOSIS — R50.81 NEUTROPENIC FEVER (HCC): ICD-10-CM

## 2021-01-01 DIAGNOSIS — E03.9 ACQUIRED HYPOTHYROIDISM: ICD-10-CM

## 2021-01-01 DIAGNOSIS — M79.89 LEG SWELLING: Primary | ICD-10-CM

## 2021-01-01 DIAGNOSIS — K86.89 PANCREATIC MASS: Primary | ICD-10-CM

## 2021-01-01 DIAGNOSIS — U07.1 PNEUMONIA DUE TO COVID-19 VIRUS: Primary | ICD-10-CM

## 2021-01-01 DIAGNOSIS — R73.09 ELEVATED GLUCOSE: Primary | ICD-10-CM

## 2021-01-01 DIAGNOSIS — R16.0 LIVER MASS: ICD-10-CM

## 2021-01-01 DIAGNOSIS — I82.462 ACUTE DEEP VEIN THROMBOSIS (DVT) OF CALF MUSCLE VEIN OF LEFT LOWER EXTREMITY (HCC): ICD-10-CM

## 2021-01-01 DIAGNOSIS — Z23 NEED FOR INFLUENZA VACCINATION: Primary | ICD-10-CM

## 2021-01-01 DIAGNOSIS — J12.82 PNEUMONIA DUE TO COVID-19 VIRUS: Primary | ICD-10-CM

## 2021-01-01 LAB
ALBUMIN SERPL-MCNC: 2.6 G/DL (ref 3.5–5.2)
ALBUMIN SERPL-MCNC: 3.2 G/DL (ref 3.5–5.2)
ALBUMIN SERPL-MCNC: 3.6 G/DL (ref 3.5–5.2)
ALBUMIN SERPL-MCNC: NORMAL G/DL
ALBUMIN SERPL-MCNC: NORMAL G/DL
ALP BLD-CCNC: 138 U/L (ref 35–104)
ALP BLD-CCNC: 147 U/L (ref 35–104)
ALP BLD-CCNC: 167 U/L (ref 35–104)
ALP BLD-CCNC: NORMAL U/L
ALP BLD-CCNC: NORMAL U/L
ALT SERPL-CCNC: 19 U/L (ref 0–32)
ALT SERPL-CCNC: 26 U/L (ref 0–32)
ALT SERPL-CCNC: 37 U/L (ref 0–32)
ALT SERPL-CCNC: NORMAL U/L
ALT SERPL-CCNC: NORMAL U/L
ANION GAP SERPL CALCULATED.3IONS-SCNC: 11 MMOL/L (ref 7–16)
ANION GAP SERPL CALCULATED.3IONS-SCNC: 11 MMOL/L (ref 7–16)
ANION GAP SERPL CALCULATED.3IONS-SCNC: 13 MMOL/L (ref 7–16)
ANION GAP SERPL CALCULATED.3IONS-SCNC: 9 MMOL/L (ref 7–16)
ANION GAP SERPL CALCULATED.3IONS-SCNC: NORMAL MMOL/L
ANION GAP SERPL CALCULATED.3IONS-SCNC: NORMAL MMOL/L
ANISOCYTOSIS: ABNORMAL
ANISOCYTOSIS: ABNORMAL
APTT: 27.7 SEC (ref 24.5–35.1)
APTT: 30 SEC (ref 24.5–35.1)
APTT: 30.1 SEC (ref 24.5–35.1)
APTT: 43.4 SEC (ref 24.5–35.1)
APTT: 52.3 SEC (ref 24.5–35.1)
APTT: 57.9 SEC (ref 24.5–35.1)
AST SERPL-CCNC: 37 U/L (ref 0–31)
AST SERPL-CCNC: 64 U/L (ref 0–31)
AST SERPL-CCNC: 74 U/L (ref 0–31)
AST SERPL-CCNC: NORMAL U/L
AST SERPL-CCNC: NORMAL U/L
AVERAGE GLUCOSE: NORMAL
B.E.: -6 MMOL/L (ref -3–3)
BACTERIA: ABNORMAL /HPF
BACTERIA: ABNORMAL /HPF
BASOPHILS ABSOLUTE: 0.01 E9/L (ref 0–0.2)
BASOPHILS ABSOLUTE: 0.01 E9/L (ref 0–0.2)
BASOPHILS ABSOLUTE: 0.06 E9/L (ref 0–0.2)
BASOPHILS ABSOLUTE: NORMAL
BASOPHILS ABSOLUTE: NORMAL
BASOPHILS RELATIVE PERCENT: 0.2 % (ref 0–2)
BASOPHILS RELATIVE PERCENT: 0.5 % (ref 0–2)
BASOPHILS RELATIVE PERCENT: 0.6 % (ref 0–2)
BASOPHILS RELATIVE PERCENT: NORMAL
BASOPHILS RELATIVE PERCENT: NORMAL
BILIRUB SERPL-MCNC: 0.9 MG/DL (ref 0–1.2)
BILIRUB SERPL-MCNC: 1.1 MG/DL (ref 0–1.2)
BILIRUB SERPL-MCNC: 1.2 MG/DL (ref 0–1.2)
BILIRUB SERPL-MCNC: NORMAL MG/DL
BILIRUB SERPL-MCNC: NORMAL MG/DL
BILIRUBIN DIRECT: 0.4 MG/DL (ref 0–0.3)
BILIRUBIN URINE: ABNORMAL
BILIRUBIN URINE: NEGATIVE
BILIRUBIN, INDIRECT: 0.7 MG/DL (ref 0–1)
BLOOD, URINE: ABNORMAL
BLOOD, URINE: ABNORMAL
BUN BLDV-MCNC: 14 MG/DL (ref 6–23)
BUN BLDV-MCNC: 15 MG/DL (ref 6–23)
BUN BLDV-MCNC: 25 MG/DL (ref 6–23)
BUN BLDV-MCNC: 30 MG/DL (ref 6–23)
BUN BLDV-MCNC: NORMAL MG/DL
BUN BLDV-MCNC: NORMAL MG/DL
C-REACTIVE PROTEIN: 7.1 MG/DL (ref 0–0.4)
C-REACTIVE PROTEIN: 8 MG/DL (ref 0–0.4)
C-REACTIVE PROTEIN: 8.5 MG/DL (ref 0–0.4)
CA 19-9: ABNORMAL U/ML (ref 0–37)
CALCIUM SERPL-MCNC: 7.8 MG/DL (ref 8.6–10.2)
CALCIUM SERPL-MCNC: 8.3 MG/DL (ref 8.6–10.2)
CALCIUM SERPL-MCNC: 8.6 MG/DL (ref 8.6–10.2)
CALCIUM SERPL-MCNC: 9 MG/DL (ref 8.6–10.2)
CALCIUM SERPL-MCNC: NORMAL MG/DL
CALCIUM SERPL-MCNC: NORMAL MG/DL
CEA: 23.6 NG/ML (ref 0–5.2)
CHLORIDE BLD-SCNC: 102 MMOL/L (ref 98–107)
CHLORIDE BLD-SCNC: 104 MMOL/L (ref 98–107)
CHLORIDE BLD-SCNC: 105 MMOL/L (ref 98–107)
CHLORIDE BLD-SCNC: 95 MMOL/L (ref 98–107)
CHLORIDE BLD-SCNC: NORMAL MMOL/L
CHLORIDE BLD-SCNC: NORMAL MMOL/L
CHOLESTEROL, TOTAL: 107 MG/DL
CHOLESTEROL, TOTAL: 123 MG/DL
CHOLESTEROL/HDL RATIO: 3.3
CHOLESTEROL/HDL RATIO: 4
CHROMOGRANIN A: 136 NG/ML (ref 0–103)
CLARITY: CLEAR
CLARITY: CLEAR
CO2: 21 MMOL/L (ref 22–29)
CO2: 24 MMOL/L (ref 22–29)
CO2: 24 MMOL/L (ref 22–29)
CO2: 25 MMOL/L (ref 22–29)
CO2: NORMAL
CO2: NORMAL
COHB: 0.3 % (ref 0–1.5)
COLOR: YELLOW
COLOR: YELLOW
COMMENT: ABNORMAL
CREAT SERPL-MCNC: 0.9 MG/DL (ref 0.5–1)
CREAT SERPL-MCNC: 1 MG/DL (ref 0.5–1)
CREAT SERPL-MCNC: 1.1 MG/DL (ref 0.5–1)
CREAT SERPL-MCNC: 1.2 MG/DL (ref 0.5–1)
CREAT SERPL-MCNC: NORMAL MG/DL
CREAT SERPL-MCNC: NORMAL MG/DL
CRITICAL: ABNORMAL
CULTURE, BLOOD 2: NORMAL
D DIMER: 3273 NG/ML DDU
D DIMER: 3540 NG/ML DDU
D DIMER: 4077 NG/ML DDU
DATE ANALYZED: ABNORMAL
DATE OF COLLECTION: ABNORMAL
EKG ATRIAL RATE: 80 BPM
EKG P AXIS: 42 DEGREES
EKG P-R INTERVAL: 230 MS
EKG Q-T INTERVAL: 402 MS
EKG QRS DURATION: 84 MS
EKG QTC CALCULATION (BAZETT): 463 MS
EKG R AXIS: -12 DEGREES
EKG T AXIS: 96 DEGREES
EKG VENTRICULAR RATE: 80 BPM
EOSINOPHILS ABSOLUTE: 0 E9/L (ref 0.05–0.5)
EOSINOPHILS ABSOLUTE: 0.01 E9/L (ref 0.05–0.5)
EOSINOPHILS ABSOLUTE: 0.09 E9/L (ref 0.05–0.5)
EOSINOPHILS ABSOLUTE: NORMAL
EOSINOPHILS ABSOLUTE: NORMAL
EOSINOPHILS RELATIVE PERCENT: 0 % (ref 0–6)
EOSINOPHILS RELATIVE PERCENT: 0.5 % (ref 0–6)
EOSINOPHILS RELATIVE PERCENT: 0.9 % (ref 0–6)
EOSINOPHILS RELATIVE PERCENT: NORMAL
EOSINOPHILS RELATIVE PERCENT: NORMAL
EPITHELIAL CELLS, UA: ABNORMAL /HPF
EPITHELIAL CELLS, UA: ABNORMAL /HPF
FERRITIN: 1191 NG/ML
FIBRINOGEN: 475 MG/DL (ref 225–540)
GFR AFRICAN AMERICAN: 52
GFR AFRICAN AMERICAN: 57
GFR AFRICAN AMERICAN: >60
GFR AFRICAN AMERICAN: >60
GFR CALCULATED: NORMAL
GFR CALCULATED: NORMAL
GFR NON-AFRICAN AMERICAN: 43 ML/MIN/1.73
GFR NON-AFRICAN AMERICAN: 47 ML/MIN/1.73
GFR NON-AFRICAN AMERICAN: 53 ML/MIN/1.73
GFR NON-AFRICAN AMERICAN: 60 ML/MIN/1.73
GLUCOSE BLD-MCNC: 109 MG/DL (ref 74–99)
GLUCOSE BLD-MCNC: 119 MG/DL (ref 74–99)
GLUCOSE BLD-MCNC: 125 MG/DL (ref 74–99)
GLUCOSE BLD-MCNC: 161 MG/DL (ref 74–99)
GLUCOSE BLD-MCNC: NORMAL MG/DL
GLUCOSE BLD-MCNC: NORMAL MG/DL
GLUCOSE URINE: NEGATIVE MG/DL
GLUCOSE URINE: NEGATIVE MG/DL
HBA1C MFR BLD: 7.1 %
HCO3: 19.6 MMOL/L (ref 22–26)
HCT VFR BLD CALC: 26.5 % (ref 34–48)
HCT VFR BLD CALC: 30.9 % (ref 34–48)
HCT VFR BLD CALC: 35 % (ref 34–48)
HCT VFR BLD CALC: 36.2 % (ref 34–48)
HCT VFR BLD CALC: 37.3 % (ref 34–48)
HCT VFR BLD CALC: 39.8 % (ref 34–48)
HCT VFR BLD CALC: NORMAL %
HCT VFR BLD CALC: NORMAL %
HDLC SERPL-MCNC: 27 MG/DL (ref 35–70)
HDLC SERPL-MCNC: 37 MG/DL (ref 35–70)
HEMOGLOBIN: 11.2 G/DL (ref 11.5–15.5)
HEMOGLOBIN: 11.6 G/DL (ref 11.5–15.5)
HEMOGLOBIN: 11.9 G/DL (ref 11.5–15.5)
HEMOGLOBIN: 12.7 G/DL (ref 11.5–15.5)
HEMOGLOBIN: 8.3 G/DL (ref 11.5–15.5)
HEMOGLOBIN: 9.6 G/DL (ref 11.5–15.5)
HEMOGLOBIN: NORMAL
HEMOGLOBIN: NORMAL
HHB: 6.8 % (ref 0–5)
IMMATURE GRANULOCYTES #: 0.02 E9/L
IMMATURE GRANULOCYTES #: 0.07 E9/L
IMMATURE GRANULOCYTES #: 0.09 E9/L
IMMATURE GRANULOCYTES %: 0.9 % (ref 0–5)
IMMATURE GRANULOCYTES %: 1.1 % (ref 0–5)
IMMATURE GRANULOCYTES %: 1.1 % (ref 0–5)
INR BLD: 1.6
INR BLD: 2.5
KETONES, URINE: ABNORMAL MG/DL
KETONES, URINE: NEGATIVE MG/DL
LAB: ABNORMAL
LACTATE DEHYDROGENASE: 316 U/L (ref 135–214)
LACTIC ACID, SEPSIS: 0.9 MMOL/L (ref 0.5–1.9)
LACTIC ACID, SEPSIS: 1.8 MMOL/L (ref 0.5–1.9)
LACTIC ACID: 1.2 MMOL/L (ref 0.5–2.2)
LDL CHOLESTEROL CALCULATED: 61 MG/DL (ref 0–160)
LDL CHOLESTEROL CALCULATED: 65 MG/DL (ref 0–160)
LEUKOCYTE ESTERASE, URINE: ABNORMAL
LEUKOCYTE ESTERASE, URINE: NEGATIVE
LIPASE: 29 U/L (ref 13–60)
LV EF: 63 %
LV EF: 70 %
LVEF MODALITY: NORMAL
LVEF MODALITY: NORMAL
LYMPHOCYTES ABSOLUTE: 0.23 E9/L (ref 1.5–4)
LYMPHOCYTES ABSOLUTE: 0.32 E9/L (ref 1.5–4)
LYMPHOCYTES ABSOLUTE: 1.17 E9/L (ref 1.5–4)
LYMPHOCYTES ABSOLUTE: NORMAL
LYMPHOCYTES ABSOLUTE: NORMAL
LYMPHOCYTES RELATIVE PERCENT: 11.2 % (ref 20–42)
LYMPHOCYTES RELATIVE PERCENT: 12.1 % (ref 20–42)
LYMPHOCYTES RELATIVE PERCENT: 4.9 % (ref 20–42)
LYMPHOCYTES RELATIVE PERCENT: NORMAL
LYMPHOCYTES RELATIVE PERCENT: NORMAL
Lab: ABNORMAL
MAGNESIUM: 2.2 MG/DL (ref 1.6–2.6)
MCH RBC QN AUTO: 25.6 PG (ref 26–35)
MCH RBC QN AUTO: 25.6 PG (ref 26–35)
MCH RBC QN AUTO: 25.9 PG (ref 26–35)
MCH RBC QN AUTO: 26.4 PG (ref 26–35)
MCH RBC QN AUTO: NORMAL PG
MCH RBC QN AUTO: NORMAL PG
MCHC RBC AUTO-ENTMCNC: 31.1 % (ref 32–34.5)
MCHC RBC AUTO-ENTMCNC: 31.3 % (ref 32–34.5)
MCHC RBC AUTO-ENTMCNC: 31.9 % (ref 32–34.5)
MCHC RBC AUTO-ENTMCNC: 31.9 % (ref 32–34.5)
MCHC RBC AUTO-ENTMCNC: 32 % (ref 32–34.5)
MCHC RBC AUTO-ENTMCNC: 32 % (ref 32–34.5)
MCHC RBC AUTO-ENTMCNC: NORMAL G/DL
MCHC RBC AUTO-ENTMCNC: NORMAL G/DL
MCV RBC AUTO: 80.8 FL (ref 80–99.9)
MCV RBC AUTO: 81.2 FL (ref 80–99.9)
MCV RBC AUTO: 81.3 FL (ref 80–99.9)
MCV RBC AUTO: 81.8 FL (ref 80–99.9)
MCV RBC AUTO: 82.4 FL (ref 80–99.9)
MCV RBC AUTO: 82.5 FL (ref 80–99.9)
MCV RBC AUTO: NORMAL FL
MCV RBC AUTO: NORMAL FL
METHB: 0.3 % (ref 0–1.5)
MODE: ABNORMAL
MONOCYTES ABSOLUTE: 0.05 E9/L (ref 0.1–0.95)
MONOCYTES ABSOLUTE: 0.19 E9/L (ref 0.1–0.95)
MONOCYTES ABSOLUTE: 0.9 E9/L (ref 0.1–0.95)
MONOCYTES ABSOLUTE: NORMAL
MONOCYTES ABSOLUTE: NORMAL
MONOCYTES RELATIVE PERCENT: 2.6 % (ref 2–12)
MONOCYTES RELATIVE PERCENT: 2.9 % (ref 2–12)
MONOCYTES RELATIVE PERCENT: 8.6 % (ref 2–12)
MONOCYTES RELATIVE PERCENT: NORMAL
MONOCYTES RELATIVE PERCENT: NORMAL
NEUTROPHILS ABSOLUTE: 1.58 E9/L (ref 1.8–7.3)
NEUTROPHILS ABSOLUTE: 5.98 E9/L (ref 1.8–7.3)
NEUTROPHILS ABSOLUTE: 8.16 E9/L (ref 1.8–7.3)
NEUTROPHILS ABSOLUTE: NORMAL
NEUTROPHILS ABSOLUTE: NORMAL
NEUTROPHILS RELATIVE PERCENT: 77.8 % (ref 43–80)
NEUTROPHILS RELATIVE PERCENT: 83.2 % (ref 43–80)
NEUTROPHILS RELATIVE PERCENT: 90.9 % (ref 43–80)
NEUTROPHILS RELATIVE PERCENT: NORMAL
NEUTROPHILS RELATIVE PERCENT: NORMAL
NITRITE, URINE: NEGATIVE
NITRITE, URINE: POSITIVE
NONHDLC SERPL-MCNC: 80 MG/DL
NONHDLC SERPL-MCNC: 86 MG/DL
O2 CONTENT: 12.7 ML/DL
O2 SATURATION: 93.2 % (ref 92–98.5)
O2HB: 92.6 % (ref 94–97)
OPERATOR ID: 101
OVALOCYTES: ABNORMAL
OVALOCYTES: ABNORMAL
PATHOLOGIST REVIEW: NORMAL
PATIENT TEMP: 37 C
PCO2: 38.9 MMHG (ref 35–45)
PDW BLD-RTO: 15.5 FL (ref 11.5–15)
PDW BLD-RTO: 15.6 FL (ref 11.5–15)
PDW BLD-RTO: 15.8 FL (ref 11.5–15)
PDW BLD-RTO: 15.9 FL (ref 11.5–15)
PDW BLD-RTO: 16.8 FL (ref 11.5–15)
PDW BLD-RTO: 17.1 FL (ref 11.5–15)
PDW BLD-RTO: NORMAL %
PDW BLD-RTO: NORMAL %
PH BLOOD GAS: 7.32 (ref 7.35–7.45)
PH UA: 5.5 (ref 5–9)
PH UA: 6 (ref 5–9)
PLATELET # BLD: 112 E9/L (ref 130–450)
PLATELET # BLD: 113 E9/L (ref 130–450)
PLATELET # BLD: 119 E9/L (ref 130–450)
PLATELET # BLD: 119 E9/L (ref 130–450)
PLATELET # BLD: 43 E9/L (ref 130–450)
PLATELET # BLD: 50 E9/L (ref 130–450)
PLATELET # BLD: NORMAL 10*3/UL
PLATELET # BLD: NORMAL 10*3/UL
PLATELET CONFIRMATION: NORMAL
PLATELET CONFIRMATION: NORMAL
PMV BLD AUTO: 10.9 FL (ref 7–12)
PMV BLD AUTO: 11.2 FL (ref 7–12)
PMV BLD AUTO: 11.8 FL (ref 7–12)
PMV BLD AUTO: 11.9 FL (ref 7–12)
PMV BLD AUTO: 12 FL (ref 7–12)
PMV BLD AUTO: 12.4 FL (ref 7–12)
PMV BLD AUTO: NORMAL FL
PMV BLD AUTO: NORMAL FL
PO2: 75.4 MMHG (ref 75–100)
POIKILOCYTES: ABNORMAL
POIKILOCYTES: ABNORMAL
POTASSIUM REFLEX MAGNESIUM: 3.4 MMOL/L (ref 3.5–5)
POTASSIUM REFLEX MAGNESIUM: 3.8 MMOL/L (ref 3.5–5)
POTASSIUM REFLEX MAGNESIUM: 4.3 MMOL/L (ref 3.5–5)
POTASSIUM SERPL-SCNC: 3.5 MMOL/L (ref 3.5–5)
POTASSIUM SERPL-SCNC: NORMAL MMOL/L
POTASSIUM SERPL-SCNC: NORMAL MMOL/L
PRO-BNP: 6295 PG/ML (ref 0–450)
PROCALCITONIN: 0.65 NG/ML (ref 0–0.08)
PROCALCITONIN: 0.69 NG/ML (ref 0–0.08)
PROTEIN UA: 30 MG/DL
PROTEIN UA: NEGATIVE MG/DL
PROTHROMBIN TIME: 17.3 SEC (ref 9.3–12.4)
PROTHROMBIN TIME: 26.9 SEC (ref 9.3–12.4)
RBC # BLD: 3.24 E12/L (ref 3.5–5.5)
RBC # BLD: 3.75 E12/L (ref 3.5–5.5)
RBC # BLD: 4.33 E12/L (ref 3.5–5.5)
RBC # BLD: 4.39 E12/L (ref 3.5–5.5)
RBC # BLD: 4.59 E12/L (ref 3.5–5.5)
RBC # BLD: 4.9 E12/L (ref 3.5–5.5)
RBC # BLD: NORMAL 10*6/UL
RBC # BLD: NORMAL 10*6/UL
RBC UA: ABNORMAL /HPF (ref 0–2)
RBC UA: ABNORMAL /HPF (ref 0–2)
SARS-COV-2, NAAT: DETECTED
SCHISTOCYTES: ABNORMAL
SCHISTOCYTES: ABNORMAL
SODIUM BLD-SCNC: 132 MMOL/L (ref 132–146)
SODIUM BLD-SCNC: 137 MMOL/L (ref 132–146)
SODIUM BLD-SCNC: 137 MMOL/L (ref 132–146)
SODIUM BLD-SCNC: 138 MMOL/L (ref 132–146)
SODIUM BLD-SCNC: NORMAL MMOL/L
SODIUM BLD-SCNC: NORMAL MMOL/L
SOURCE, BLOOD GAS: ABNORMAL
SPECIFIC GRAVITY UA: 1.01 (ref 1–1.03)
SPECIFIC GRAVITY UA: 1.02 (ref 1–1.03)
STREP PNEUMONIAE ANTIGEN, URINE: NORMAL
THB: 9.7 G/DL (ref 11.5–16.5)
TIME ANALYZED: 1305
TOTAL PROTEIN: 5.3 G/DL (ref 6.4–8.3)
TOTAL PROTEIN: 6.3 G/DL (ref 6.4–8.3)
TOTAL PROTEIN: 6.7 G/DL (ref 6.4–8.3)
TOTAL PROTEIN: NORMAL
TOTAL PROTEIN: NORMAL
TRIGL SERPL-MCNC: 104 MG/DL
TRIGL SERPL-MCNC: 120 MG/DL
TROPONIN, HIGH SENSITIVITY: 28 NG/L (ref 0–9)
TROPONIN, HIGH SENSITIVITY: 321 NG/L (ref 0–9)
TROPONIN, HIGH SENSITIVITY: 392 NG/L (ref 0–9)
TROPONIN, HIGH SENSITIVITY: 400 NG/L (ref 0–9)
TSH SERPL DL<=0.05 MIU/L-ACNC: NORMAL M[IU]/L
TSH SERPL DL<=0.05 MIU/L-ACNC: NORMAL M[IU]/L
URINE CULTURE, ROUTINE: NORMAL
URINE CULTURE, ROUTINE: NORMAL
UROBILINOGEN, URINE: 2 E.U./DL
UROBILINOGEN, URINE: 2 E.U./DL
VITAMIN D 25-HYDROXY: 10 NG/ML (ref 30–100)
VLDLC SERPL CALC-MCNC: ABNORMAL MG/DL
VLDLC SERPL CALC-MCNC: NORMAL MG/DL
WBC # BLD: 1.9 E9/L (ref 4.5–11.5)
WBC # BLD: 10.3 E9/L (ref 4.5–11.5)
WBC # BLD: 10.5 E9/L (ref 4.5–11.5)
WBC # BLD: 6.6 E9/L (ref 4.5–11.5)
WBC # BLD: 7.7 E9/L (ref 4.5–11.5)
WBC # BLD: 8.3 E9/L (ref 4.5–11.5)
WBC # BLD: NORMAL 10*3/UL
WBC # BLD: NORMAL 10*3/UL
WBC UA: ABNORMAL /HPF (ref 0–5)
WBC UA: ABNORMAL /HPF (ref 0–5)

## 2021-01-01 PROCEDURE — 3430000000 HC RX DIAGNOSTIC RADIOPHARMACEUTICAL: Performed by: RADIOLOGY

## 2021-01-01 PROCEDURE — 36415 COLL VENOUS BLD VENIPUNCTURE: CPT

## 2021-01-01 PROCEDURE — APPSS45 APP SPLIT SHARED TIME 31-45 MINUTES: Performed by: PHYSICIAN ASSISTANT

## 2021-01-01 PROCEDURE — 78452 HT MUSCLE IMAGE SPECT MULT: CPT

## 2021-01-01 PROCEDURE — 96375 TX/PRO/DX INJ NEW DRUG ADDON: CPT

## 2021-01-01 PROCEDURE — 6360000002 HC RX W HCPCS: Performed by: STUDENT IN AN ORGANIZED HEALTH CARE EDUCATION/TRAINING PROGRAM

## 2021-01-01 PROCEDURE — 85025 COMPLETE CBC W/AUTO DIFF WBC: CPT

## 2021-01-01 PROCEDURE — G8400 PT W/DXA NO RESULTS DOC: HCPCS | Performed by: FAMILY MEDICINE

## 2021-01-01 PROCEDURE — 6360000002 HC RX W HCPCS: Performed by: EMERGENCY MEDICINE

## 2021-01-01 PROCEDURE — 6370000000 HC RX 637 (ALT 250 FOR IP): Performed by: INTERNAL MEDICINE

## 2021-01-01 PROCEDURE — 94660 CPAP INITIATION&MGMT: CPT

## 2021-01-01 PROCEDURE — G8428 CUR MEDS NOT DOCUMENT: HCPCS | Performed by: FAMILY MEDICINE

## 2021-01-01 PROCEDURE — 99232 SBSQ HOSP IP/OBS MODERATE 35: CPT | Performed by: SURGERY

## 2021-01-01 PROCEDURE — 1123F ACP DISCUSS/DSCN MKR DOCD: CPT | Performed by: FAMILY MEDICINE

## 2021-01-01 PROCEDURE — 1123F ACP DISCUSS/DSCN MKR DOCD: CPT | Performed by: PHYSICIAN ASSISTANT

## 2021-01-01 PROCEDURE — 96372 THER/PROPH/DIAG INJ SC/IM: CPT

## 2021-01-01 PROCEDURE — A9500 TC99M SESTAMIBI: HCPCS | Performed by: RADIOLOGY

## 2021-01-01 PROCEDURE — 1090F PRES/ABSN URINE INCON ASSESS: CPT | Performed by: PHYSICIAN ASSISTANT

## 2021-01-01 PROCEDURE — 87635 SARS-COV-2 COVID-19 AMP PRB: CPT

## 2021-01-01 PROCEDURE — 93016 CV STRESS TEST SUPVJ ONLY: CPT | Performed by: INTERNAL MEDICINE

## 2021-01-01 PROCEDURE — 93005 ELECTROCARDIOGRAM TRACING: CPT | Performed by: NURSE PRACTITIONER

## 2021-01-01 PROCEDURE — G8484 FLU IMMUNIZE NO ADMIN: HCPCS | Performed by: PHYSICIAN ASSISTANT

## 2021-01-01 PROCEDURE — 74177 CT ABD & PELVIS W/CONTRAST: CPT

## 2021-01-01 PROCEDURE — 91300 COVID-19, PFIZER VACCINE 30MCG/0.3ML DOSE: CPT | Performed by: CLINICAL NURSE SPECIALIST

## 2021-01-01 PROCEDURE — 6370000000 HC RX 637 (ALT 250 FOR IP): Performed by: FAMILY MEDICINE

## 2021-01-01 PROCEDURE — 99223 1ST HOSP IP/OBS HIGH 75: CPT | Performed by: INTERNAL MEDICINE

## 2021-01-01 PROCEDURE — 1200000000 HC SEMI PRIVATE

## 2021-01-01 PROCEDURE — 2580000003 HC RX 258: Performed by: EMERGENCY MEDICINE

## 2021-01-01 PROCEDURE — 2580000003 HC RX 258: Performed by: REGISTERED NURSE

## 2021-01-01 PROCEDURE — 6360000004 HC RX CONTRAST MEDICATION: Performed by: RADIOLOGY

## 2021-01-01 PROCEDURE — 85027 COMPLETE CBC AUTOMATED: CPT

## 2021-01-01 PROCEDURE — 2709999900 CT NEEDLE BIOPSY LIVER PERCUTANEOUS

## 2021-01-01 PROCEDURE — 81001 URINALYSIS AUTO W/SCOPE: CPT

## 2021-01-01 PROCEDURE — 6360000002 HC RX W HCPCS

## 2021-01-01 PROCEDURE — 2580000003 HC RX 258: Performed by: FAMILY MEDICINE

## 2021-01-01 PROCEDURE — 6360000002 HC RX W HCPCS: Performed by: FAMILY MEDICINE

## 2021-01-01 PROCEDURE — 2580000003 HC RX 258: Performed by: INTERNAL MEDICINE

## 2021-01-01 PROCEDURE — 2500000003 HC RX 250 WO HCPCS: Performed by: STUDENT IN AN ORGANIZED HEALTH CARE EDUCATION/TRAINING PROGRAM

## 2021-01-01 PROCEDURE — G8484 FLU IMMUNIZE NO ADMIN: HCPCS | Performed by: FAMILY MEDICINE

## 2021-01-01 PROCEDURE — 83690 ASSAY OF LIPASE: CPT

## 2021-01-01 PROCEDURE — 70450 CT HEAD/BRAIN W/O DYE: CPT

## 2021-01-01 PROCEDURE — 85378 FIBRIN DEGRADE SEMIQUANT: CPT

## 2021-01-01 PROCEDURE — 99213 OFFICE O/P EST LOW 20 MIN: CPT | Performed by: FAMILY MEDICINE

## 2021-01-01 PROCEDURE — 99214 OFFICE O/P EST MOD 30 MIN: CPT | Performed by: PHYSICIAN ASSISTANT

## 2021-01-01 PROCEDURE — G8427 DOCREV CUR MEDS BY ELIG CLIN: HCPCS | Performed by: PHYSICIAN ASSISTANT

## 2021-01-01 PROCEDURE — 1036F TOBACCO NON-USER: CPT | Performed by: FAMILY MEDICINE

## 2021-01-01 PROCEDURE — 2580000003 HC RX 258: Performed by: PHYSICIAN ASSISTANT

## 2021-01-01 PROCEDURE — G8417 CALC BMI ABV UP PARAM F/U: HCPCS | Performed by: FAMILY MEDICINE

## 2021-01-01 PROCEDURE — 71101 X-RAY EXAM UNILAT RIBS/CHEST: CPT

## 2021-01-01 PROCEDURE — G8417 CALC BMI ABV UP PARAM F/U: HCPCS | Performed by: PHYSICIAN ASSISTANT

## 2021-01-01 PROCEDURE — G8427 DOCREV CUR MEDS BY ELIG CLIN: HCPCS | Performed by: FAMILY MEDICINE

## 2021-01-01 PROCEDURE — 99214 OFFICE O/P EST MOD 30 MIN: CPT | Performed by: FAMILY MEDICINE

## 2021-01-01 PROCEDURE — 1090F PRES/ABSN URINE INCON ASSESS: CPT | Performed by: FAMILY MEDICINE

## 2021-01-01 PROCEDURE — 80053 COMPREHEN METABOLIC PANEL: CPT

## 2021-01-01 PROCEDURE — 93017 CV STRESS TEST TRACING ONLY: CPT

## 2021-01-01 PROCEDURE — G8400 PT W/DXA NO RESULTS DOC: HCPCS | Performed by: PHYSICIAN ASSISTANT

## 2021-01-01 PROCEDURE — 85730 THROMBOPLASTIN TIME PARTIAL: CPT

## 2021-01-01 PROCEDURE — 85610 PROTHROMBIN TIME: CPT

## 2021-01-01 PROCEDURE — 83615 LACTATE (LD) (LDH) ENZYME: CPT

## 2021-01-01 PROCEDURE — 6360000002 HC RX W HCPCS: Performed by: INTERNAL MEDICINE

## 2021-01-01 PROCEDURE — 96376 TX/PRO/DX INJ SAME DRUG ADON: CPT

## 2021-01-01 PROCEDURE — 80048 BASIC METABOLIC PNL TOTAL CA: CPT

## 2021-01-01 PROCEDURE — 84484 ASSAY OF TROPONIN QUANT: CPT

## 2021-01-01 PROCEDURE — 96374 THER/PROPH/DIAG INJ IV PUSH: CPT

## 2021-01-01 PROCEDURE — 6360000002 HC RX W HCPCS: Performed by: PHYSICIAN ASSISTANT

## 2021-01-01 PROCEDURE — 1036F TOBACCO NON-USER: CPT | Performed by: PHYSICIAN ASSISTANT

## 2021-01-01 PROCEDURE — 6360000002 HC RX W HCPCS: Performed by: REGISTERED NURSE

## 2021-01-01 PROCEDURE — 93018 CV STRESS TEST I&R ONLY: CPT | Performed by: INTERNAL MEDICINE

## 2021-01-01 PROCEDURE — 6370000000 HC RX 637 (ALT 250 FOR IP): Performed by: STUDENT IN AN ORGANIZED HEALTH CARE EDUCATION/TRAINING PROGRAM

## 2021-01-01 PROCEDURE — 4040F PNEUMOC VAC/ADMIN/RCVD: CPT | Performed by: FAMILY MEDICINE

## 2021-01-01 PROCEDURE — 87088 URINE BACTERIA CULTURE: CPT

## 2021-01-01 PROCEDURE — 71045 X-RAY EXAM CHEST 1 VIEW: CPT

## 2021-01-01 PROCEDURE — 99284 EMERGENCY DEPT VISIT MOD MDM: CPT

## 2021-01-01 PROCEDURE — 78452 HT MUSCLE IMAGE SPECT MULT: CPT | Performed by: INTERNAL MEDICINE

## 2021-01-01 PROCEDURE — 86140 C-REACTIVE PROTEIN: CPT

## 2021-01-01 PROCEDURE — A9503 TC99M MEDRONATE: HCPCS | Performed by: RADIOLOGY

## 2021-01-01 PROCEDURE — 2700000000 HC OXYGEN THERAPY PER DAY

## 2021-01-01 PROCEDURE — 2500000003 HC RX 250 WO HCPCS: Performed by: SPECIALIST

## 2021-01-01 PROCEDURE — XW03396 INTRODUCTION OF CEFTOLOZANE/TAZOBACTAM ANTI-INFECTIVE INTO PERIPHERAL VEIN, PERCUTANEOUS APPROACH, NEW TECHNOLOGY GROUP 6: ICD-10-PCS | Performed by: INTERNAL MEDICINE

## 2021-01-01 PROCEDURE — 71275 CT ANGIOGRAPHY CHEST: CPT

## 2021-01-01 PROCEDURE — 83605 ASSAY OF LACTIC ACID: CPT

## 2021-01-01 PROCEDURE — 0001A COVID-19, PFIZER VACCINE 30MCG/0.3ML DOSE: CPT | Performed by: PHYSICIAN ASSISTANT

## 2021-01-01 PROCEDURE — 93010 ELECTROCARDIOGRAM REPORT: CPT | Performed by: INTERNAL MEDICINE

## 2021-01-01 PROCEDURE — 99222 1ST HOSP IP/OBS MODERATE 55: CPT | Performed by: STUDENT IN AN ORGANIZED HEALTH CARE EDUCATION/TRAINING PROGRAM

## 2021-01-01 PROCEDURE — 6370000000 HC RX 637 (ALT 250 FOR IP): Performed by: PHYSICIAN ASSISTANT

## 2021-01-01 PROCEDURE — 6360000002 HC RX W HCPCS: Performed by: SPECIALIST

## 2021-01-01 PROCEDURE — 71260 CT THORAX DX C+: CPT

## 2021-01-01 PROCEDURE — 87040 BLOOD CULTURE FOR BACTERIA: CPT

## 2021-01-01 PROCEDURE — 86316 IMMUNOASSAY TUMOR OTHER: CPT

## 2021-01-01 PROCEDURE — 90694 VACC AIIV4 NO PRSRV 0.5ML IM: CPT | Performed by: FAMILY MEDICINE

## 2021-01-01 PROCEDURE — 84145 PROCALCITONIN (PCT): CPT

## 2021-01-01 PROCEDURE — 80076 HEPATIC FUNCTION PANEL: CPT

## 2021-01-01 PROCEDURE — G0439 PPPS, SUBSEQ VISIT: HCPCS | Performed by: FAMILY MEDICINE

## 2021-01-01 PROCEDURE — 82805 BLOOD GASES W/O2 SATURATION: CPT

## 2021-01-01 PROCEDURE — 93306 TTE W/DOPPLER COMPLETE: CPT

## 2021-01-01 PROCEDURE — 77012 CT SCAN FOR NEEDLE BIOPSY: CPT

## 2021-01-01 PROCEDURE — 96361 HYDRATE IV INFUSION ADD-ON: CPT

## 2021-01-01 PROCEDURE — 2580000003 HC RX 258: Performed by: STUDENT IN AN ORGANIZED HEALTH CARE EDUCATION/TRAINING PROGRAM

## 2021-01-01 PROCEDURE — XW033E5 INTRODUCTION OF REMDESIVIR ANTI-INFECTIVE INTO PERIPHERAL VEIN, PERCUTANEOUS APPROACH, NEW TECHNOLOGY GROUP 5: ICD-10-PCS | Performed by: INTERNAL MEDICINE

## 2021-01-01 PROCEDURE — 99232 SBSQ HOSP IP/OBS MODERATE 35: CPT | Performed by: INTERNAL MEDICINE

## 2021-01-01 PROCEDURE — APPSS30 APP SPLIT SHARED TIME 16-30 MINUTES: Performed by: PHYSICIAN ASSISTANT

## 2021-01-01 PROCEDURE — 91300 COVID-19, PFIZER VACCINE 30MCG/0.3ML DOSE: CPT | Performed by: PHYSICIAN ASSISTANT

## 2021-01-01 PROCEDURE — 82306 VITAMIN D 25 HYDROXY: CPT

## 2021-01-01 PROCEDURE — APPSS60 APP SPLIT SHARED TIME 46-60 MINUTES: Performed by: PHYSICIAN ASSISTANT

## 2021-01-01 PROCEDURE — 99213 OFFICE O/P EST LOW 20 MIN: CPT | Performed by: PHYSICIAN ASSISTANT

## 2021-01-01 PROCEDURE — 96366 THER/PROPH/DIAG IV INF ADDON: CPT

## 2021-01-01 PROCEDURE — 99285 EMERGENCY DEPT VISIT HI MDM: CPT

## 2021-01-01 PROCEDURE — 78306 BONE IMAGING WHOLE BODY: CPT

## 2021-01-01 PROCEDURE — 0002A COVID-19, PFIZER VACCINE 30MCG/0.3ML DOSE: CPT | Performed by: CLINICAL NURSE SPECIALIST

## 2021-01-01 PROCEDURE — 85384 FIBRINOGEN ACTIVITY: CPT

## 2021-01-01 PROCEDURE — 1111F DSCHRG MED/CURRENT MED MERGE: CPT | Performed by: PHYSICIAN ASSISTANT

## 2021-01-01 PROCEDURE — 87449 NOS EACH ORGANISM AG IA: CPT

## 2021-01-01 PROCEDURE — 2060000000 HC ICU INTERMEDIATE R&B

## 2021-01-01 PROCEDURE — 82378 CARCINOEMBRYONIC ANTIGEN: CPT

## 2021-01-01 PROCEDURE — 4040F PNEUMOC VAC/ADMIN/RCVD: CPT | Performed by: PHYSICIAN ASSISTANT

## 2021-01-01 PROCEDURE — 83880 ASSAY OF NATRIURETIC PEPTIDE: CPT

## 2021-01-01 PROCEDURE — 82728 ASSAY OF FERRITIN: CPT

## 2021-01-01 PROCEDURE — 88341 IMHCHEM/IMCYTCHM EA ADD ANTB: CPT

## 2021-01-01 PROCEDURE — 96365 THER/PROPH/DIAG IV INF INIT: CPT

## 2021-01-01 PROCEDURE — 83735 ASSAY OF MAGNESIUM: CPT

## 2021-01-01 PROCEDURE — 6370000000 HC RX 637 (ALT 250 FOR IP): Performed by: EMERGENCY MEDICINE

## 2021-01-01 PROCEDURE — 6360000002 HC RX W HCPCS: Performed by: RADIOLOGY

## 2021-01-01 PROCEDURE — G0008 ADMIN INFLUENZA VIRUS VAC: HCPCS | Performed by: FAMILY MEDICINE

## 2021-01-01 PROCEDURE — 88307 TISSUE EXAM BY PATHOLOGIST: CPT

## 2021-01-01 PROCEDURE — 86301 IMMUNOASSAY TUMOR CA 19-9: CPT

## 2021-01-01 PROCEDURE — 0FB03ZX EXCISION OF LIVER, PERCUTANEOUS APPROACH, DIAGNOSTIC: ICD-10-PCS | Performed by: RADIOLOGY

## 2021-01-01 PROCEDURE — C9113 INJ PANTOPRAZOLE SODIUM, VIA: HCPCS | Performed by: INTERNAL MEDICINE

## 2021-01-01 PROCEDURE — 2500000003 HC RX 250 WO HCPCS: Performed by: RADIOLOGY

## 2021-01-01 PROCEDURE — C9113 INJ PANTOPRAZOLE SODIUM, VIA: HCPCS

## 2021-01-01 PROCEDURE — 2580000003 HC RX 258: Performed by: SPECIALIST

## 2021-01-01 PROCEDURE — 88342 IMHCHEM/IMCYTCHM 1ST ANTB: CPT

## 2021-01-01 RX ORDER — DEXAMETHASONE SODIUM PHOSPHATE 4 MG/ML
6 INJECTION, SOLUTION INTRA-ARTICULAR; INTRALESIONAL; INTRAMUSCULAR; INTRAVENOUS; SOFT TISSUE EVERY 24 HOURS
Status: DISCONTINUED | OUTPATIENT
Start: 2021-01-01 | End: 2021-01-01

## 2021-01-01 RX ORDER — ASPIRIN 81 MG/1
324 TABLET, CHEWABLE ORAL ONCE
Status: COMPLETED | OUTPATIENT
Start: 2021-01-01 | End: 2021-01-01

## 2021-01-01 RX ORDER — ATORVASTATIN CALCIUM 40 MG/1
40 TABLET, FILM COATED ORAL NIGHTLY
Status: DISCONTINUED | OUTPATIENT
Start: 2021-01-01 | End: 2021-01-01 | Stop reason: ALTCHOICE

## 2021-01-01 RX ORDER — ATORVASTATIN CALCIUM 20 MG/1
20 TABLET, FILM COATED ORAL NIGHTLY
Status: DISCONTINUED | OUTPATIENT
Start: 2021-01-01 | End: 2021-01-01

## 2021-01-01 RX ORDER — HYDROCODONE BITARTRATE AND ACETAMINOPHEN 5; 325 MG/1; MG/1
1 TABLET ORAL EVERY 6 HOURS PRN
Qty: 28 TABLET | Refills: 0 | Status: SHIPPED | OUTPATIENT
Start: 2021-01-01 | End: 2021-01-01

## 2021-01-01 RX ORDER — ACETYLCYSTEINE 200 MG/ML
600 SOLUTION ORAL; RESPIRATORY (INHALATION) 2 TIMES DAILY
Status: DISCONTINUED | OUTPATIENT
Start: 2021-01-01 | End: 2021-01-01 | Stop reason: CLARIF

## 2021-01-01 RX ORDER — HEPARIN SODIUM 1000 [USP'U]/ML
30 INJECTION, SOLUTION INTRAVENOUS; SUBCUTANEOUS PRN
Status: DISCONTINUED | OUTPATIENT
Start: 2021-01-01 | End: 2021-01-01

## 2021-01-01 RX ORDER — BUDESONIDE AND FORMOTEROL FUMARATE DIHYDRATE 160; 4.5 UG/1; UG/1
2 AEROSOL RESPIRATORY (INHALATION) 2 TIMES DAILY
Status: DISCONTINUED | OUTPATIENT
Start: 2021-01-01 | End: 2021-01-01 | Stop reason: HOSPADM

## 2021-01-01 RX ORDER — ACETAMINOPHEN 325 MG/1
650 TABLET ORAL ONCE
Status: COMPLETED | OUTPATIENT
Start: 2021-01-01 | End: 2021-01-01

## 2021-01-01 RX ORDER — LEVOTHYROXINE SODIUM 0.03 MG/1
25 TABLET ORAL DAILY
Qty: 90 TABLET | Refills: 1 | Status: SHIPPED
Start: 2021-01-01 | End: 2021-01-01 | Stop reason: SDUPTHER

## 2021-01-01 RX ORDER — SODIUM CHLORIDE 0.9 % (FLUSH) 0.9 %
5-40 SYRINGE (ML) INJECTION PRN
Status: DISCONTINUED | OUTPATIENT
Start: 2021-01-01 | End: 2021-01-01 | Stop reason: HOSPADM

## 2021-01-01 RX ORDER — 0.9 % SODIUM CHLORIDE 0.9 %
30 INTRAVENOUS SOLUTION INTRAVENOUS ONCE
Status: COMPLETED | OUTPATIENT
Start: 2021-01-01 | End: 2021-01-01

## 2021-01-01 RX ORDER — CITALOPRAM 40 MG/1
40 TABLET ORAL DAILY
Qty: 90 TABLET | Refills: 1 | Status: SHIPPED
Start: 2021-01-01 | End: 2021-01-01

## 2021-01-01 RX ORDER — MIDAZOLAM HYDROCHLORIDE 2 MG/2ML
0.5 INJECTION, SOLUTION INTRAMUSCULAR; INTRAVENOUS ONCE
Status: COMPLETED | OUTPATIENT
Start: 2021-01-01 | End: 2021-01-01

## 2021-01-01 RX ORDER — PANTOPRAZOLE SODIUM 40 MG/10ML
40 INJECTION, POWDER, LYOPHILIZED, FOR SOLUTION INTRAVENOUS DAILY
Status: DISCONTINUED | OUTPATIENT
Start: 2021-01-01 | End: 2021-01-01

## 2021-01-01 RX ORDER — ONDANSETRON 4 MG/1
4 TABLET, ORALLY DISINTEGRATING ORAL EVERY 8 HOURS PRN
Qty: 30 TABLET | Refills: 3 | Status: ON HOLD | OUTPATIENT
Start: 2021-01-01 | End: 2021-01-01 | Stop reason: HOSPADM

## 2021-01-01 RX ORDER — LEVOTHYROXINE SODIUM 0.03 MG/1
25 TABLET ORAL DAILY
Status: DISCONTINUED | OUTPATIENT
Start: 2021-01-01 | End: 2021-01-01 | Stop reason: HOSPADM

## 2021-01-01 RX ORDER — ACETAMINOPHEN 325 MG/1
650 TABLET ORAL EVERY 6 HOURS PRN
Status: DISCONTINUED | OUTPATIENT
Start: 2021-01-01 | End: 2021-01-01 | Stop reason: HOSPADM

## 2021-01-01 RX ORDER — ONDANSETRON 4 MG/1
4 TABLET, ORALLY DISINTEGRATING ORAL EVERY 8 HOURS PRN
Status: DISCONTINUED | OUTPATIENT
Start: 2021-01-01 | End: 2021-01-01 | Stop reason: HOSPADM

## 2021-01-01 RX ORDER — HYDROCODONE BITARTRATE AND ACETAMINOPHEN 5; 325 MG/1; MG/1
1 TABLET ORAL EVERY 6 HOURS PRN
Status: DISCONTINUED | OUTPATIENT
Start: 2021-01-01 | End: 2021-01-01 | Stop reason: HOSPADM

## 2021-01-01 RX ORDER — CITALOPRAM 40 MG/1
40 TABLET ORAL DAILY
Status: ON HOLD | COMMUNITY
End: 2021-01-01 | Stop reason: HOSPADM

## 2021-01-01 RX ORDER — HEPARIN SODIUM 1000 [USP'U]/ML
60 INJECTION, SOLUTION INTRAVENOUS; SUBCUTANEOUS ONCE
Status: COMPLETED | OUTPATIENT
Start: 2021-01-01 | End: 2021-01-01

## 2021-01-01 RX ORDER — ALBUTEROL SULFATE 90 UG/1
2 AEROSOL, METERED RESPIRATORY (INHALATION) 4 TIMES DAILY
Status: DISCONTINUED | OUTPATIENT
Start: 2021-01-01 | End: 2021-01-01 | Stop reason: HOSPADM

## 2021-01-01 RX ORDER — PANTOPRAZOLE SODIUM 40 MG/10ML
INJECTION, POWDER, LYOPHILIZED, FOR SOLUTION INTRAVENOUS
Status: COMPLETED
Start: 2021-01-01 | End: 2021-01-01

## 2021-01-01 RX ORDER — FAMOTIDINE 20 MG/1
40 TABLET, FILM COATED ORAL EVERY EVENING
Status: DISCONTINUED | OUTPATIENT
Start: 2021-01-01 | End: 2021-01-01 | Stop reason: HOSPADM

## 2021-01-01 RX ORDER — LIDOCAINE HYDROCHLORIDE 20 MG/ML
15 INJECTION, SOLUTION INFILTRATION; PERINEURAL ONCE
Status: COMPLETED | OUTPATIENT
Start: 2021-01-01 | End: 2021-01-01

## 2021-01-01 RX ORDER — SODIUM CHLORIDE 9 MG/ML
INJECTION, SOLUTION INTRAVENOUS CONTINUOUS
Status: DISCONTINUED | OUTPATIENT
Start: 2021-01-01 | End: 2021-01-01

## 2021-01-01 RX ORDER — MIRTAZAPINE 15 MG/1
15 TABLET, FILM COATED ORAL NIGHTLY
Status: ON HOLD | COMMUNITY
End: 2021-01-01 | Stop reason: HOSPADM

## 2021-01-01 RX ORDER — POLYETHYLENE GLYCOL 3350 17 G/17G
17 POWDER, FOR SOLUTION ORAL DAILY PRN
Status: DISCONTINUED | OUTPATIENT
Start: 2021-01-01 | End: 2021-01-01 | Stop reason: HOSPADM

## 2021-01-01 RX ORDER — GRANULES FOR ORAL 3 G/1
3 POWDER ORAL ONCE
Status: COMPLETED | OUTPATIENT
Start: 2021-01-01 | End: 2021-01-01

## 2021-01-01 RX ORDER — SODIUM CHLORIDE 9 MG/ML
25 INJECTION, SOLUTION INTRAVENOUS PRN
Status: DISCONTINUED | OUTPATIENT
Start: 2021-01-01 | End: 2021-01-01 | Stop reason: HOSPADM

## 2021-01-01 RX ORDER — DICYCLOMINE HYDROCHLORIDE 10 MG/ML
10 INJECTION INTRAMUSCULAR ONCE
Status: COMPLETED | OUTPATIENT
Start: 2021-01-01 | End: 2021-01-01

## 2021-01-01 RX ORDER — 0.9 % SODIUM CHLORIDE 0.9 %
30 INTRAVENOUS SOLUTION INTRAVENOUS PRN
Status: DISCONTINUED | OUTPATIENT
Start: 2021-01-01 | End: 2021-01-01 | Stop reason: HOSPADM

## 2021-01-01 RX ORDER — DEXAMETHASONE SODIUM PHOSPHATE 10 MG/ML
10 INJECTION INTRAMUSCULAR; INTRAVENOUS ONCE
Status: COMPLETED | OUTPATIENT
Start: 2021-01-01 | End: 2021-01-01

## 2021-01-01 RX ORDER — ONDANSETRON 2 MG/ML
4 INJECTION INTRAMUSCULAR; INTRAVENOUS ONCE
Status: COMPLETED | OUTPATIENT
Start: 2021-01-01 | End: 2021-01-01

## 2021-01-01 RX ORDER — ALPRAZOLAM 0.25 MG/1
0.25 TABLET ORAL DAILY PRN
Status: DISCONTINUED | OUTPATIENT
Start: 2021-01-01 | End: 2021-01-01 | Stop reason: HOSPADM

## 2021-01-01 RX ORDER — ACETAMINOPHEN 650 MG/1
650 SUPPOSITORY RECTAL EVERY 6 HOURS PRN
Status: DISCONTINUED | OUTPATIENT
Start: 2021-01-01 | End: 2021-01-01 | Stop reason: HOSPADM

## 2021-01-01 RX ORDER — ALPRAZOLAM 0.25 MG/1
0.25 TABLET ORAL DAILY PRN
Qty: 90 TABLET | Refills: 0 | Status: SHIPPED
Start: 2021-01-01 | End: 2021-01-01

## 2021-01-01 RX ORDER — 0.9 % SODIUM CHLORIDE 0.9 %
500 INTRAVENOUS SOLUTION INTRAVENOUS ONCE
Status: COMPLETED | OUTPATIENT
Start: 2021-01-01 | End: 2021-01-01

## 2021-01-01 RX ORDER — ALPRAZOLAM 0.25 MG/1
0.25 TABLET ORAL DAILY PRN
Qty: 90 TABLET | Refills: 0 | Status: SHIPPED
Start: 2021-01-01 | End: 2021-01-01 | Stop reason: SDUPTHER

## 2021-01-01 RX ORDER — SODIUM CHLORIDE 0.9 % (FLUSH) 0.9 %
5-40 SYRINGE (ML) INJECTION EVERY 12 HOURS SCHEDULED
Status: DISCONTINUED | OUTPATIENT
Start: 2021-01-01 | End: 2021-01-01

## 2021-01-01 RX ORDER — FLUCONAZOLE 150 MG/1
150 TABLET ORAL DAILY
COMMUNITY
Start: 2021-01-01 | End: 2021-01-01 | Stop reason: ALTCHOICE

## 2021-01-01 RX ORDER — LEVOTHYROXINE SODIUM 0.03 MG/1
25 TABLET ORAL DAILY
Qty: 90 TABLET | Refills: 1 | Status: ON HOLD
Start: 2021-01-01 | End: 2021-01-01 | Stop reason: HOSPADM

## 2021-01-01 RX ORDER — ALPRAZOLAM 0.25 MG/1
0.25 TABLET ORAL ONCE
Status: COMPLETED | OUTPATIENT
Start: 2021-01-01 | End: 2021-01-01

## 2021-01-01 RX ORDER — ATORVASTATIN CALCIUM 40 MG/1
80 TABLET, FILM COATED ORAL NIGHTLY
Status: DISCONTINUED | OUTPATIENT
Start: 2021-01-01 | End: 2021-01-01

## 2021-01-01 RX ORDER — ASPIRIN 81 MG/1
81 TABLET, CHEWABLE ORAL NIGHTLY
Status: DISCONTINUED | OUTPATIENT
Start: 2021-01-01 | End: 2021-01-01 | Stop reason: HOSPADM

## 2021-01-01 RX ORDER — ASPIRIN 81 MG/1
81 TABLET, CHEWABLE ORAL DAILY
Status: DISCONTINUED | OUTPATIENT
Start: 2021-01-01 | End: 2021-01-01 | Stop reason: HOSPADM

## 2021-01-01 RX ORDER — FENTANYL CITRATE 50 UG/ML
25 INJECTION, SOLUTION INTRAMUSCULAR; INTRAVENOUS ONCE
Status: COMPLETED | OUTPATIENT
Start: 2021-01-01 | End: 2021-01-01

## 2021-01-01 RX ORDER — TC 99M MEDRONATE 20 MG/10ML
25 INJECTION, POWDER, LYOPHILIZED, FOR SOLUTION INTRAVENOUS
Status: COMPLETED | OUTPATIENT
Start: 2021-01-01 | End: 2021-01-01

## 2021-01-01 RX ORDER — ALPRAZOLAM 0.25 MG/1
0.25 TABLET ORAL DAILY PRN
Qty: 90 TABLET | Refills: 0 | Status: SHIPPED | OUTPATIENT
Start: 2021-01-01 | End: 2021-01-01 | Stop reason: SDUPTHER

## 2021-01-01 RX ORDER — HYDROCODONE BITARTRATE AND ACETAMINOPHEN 5; 325 MG/1; MG/1
1 TABLET ORAL ONCE
Status: COMPLETED | OUTPATIENT
Start: 2021-01-01 | End: 2021-01-01

## 2021-01-01 RX ORDER — VITAMIN B COMPLEX
2000 TABLET ORAL DAILY
Status: DISCONTINUED | OUTPATIENT
Start: 2021-01-01 | End: 2021-01-01

## 2021-01-01 RX ORDER — MORPHINE SULFATE 2 MG/ML
2 INJECTION, SOLUTION INTRAMUSCULAR; INTRAVENOUS
Status: DISCONTINUED | OUTPATIENT
Start: 2021-01-01 | End: 2021-01-01 | Stop reason: HOSPADM

## 2021-01-01 RX ORDER — CITALOPRAM 20 MG/1
40 TABLET ORAL DAILY
Status: DISCONTINUED | OUTPATIENT
Start: 2021-01-01 | End: 2021-01-01 | Stop reason: HOSPADM

## 2021-01-01 RX ORDER — LORAZEPAM 2 MG/ML
0.5 INJECTION INTRAMUSCULAR
Status: DISCONTINUED | OUTPATIENT
Start: 2021-01-01 | End: 2021-01-01 | Stop reason: HOSPADM

## 2021-01-01 RX ORDER — METOPROLOL SUCCINATE 25 MG/1
25 TABLET, EXTENDED RELEASE ORAL 2 TIMES DAILY
Qty: 30 TABLET | Refills: 3 | Status: ON HOLD | OUTPATIENT
Start: 2021-01-01 | End: 2021-01-01 | Stop reason: HOSPADM

## 2021-01-01 RX ORDER — ATORVASTATIN CALCIUM 20 MG/1
20 TABLET, FILM COATED ORAL NIGHTLY
Qty: 90 TABLET | Refills: 1 | Status: SHIPPED
Start: 2021-01-01 | End: 2021-01-01 | Stop reason: SDUPTHER

## 2021-01-01 RX ORDER — ASPIRIN 81 MG/1
81 TABLET, CHEWABLE ORAL NIGHTLY
Qty: 30 TABLET | Refills: 3 | Status: ON HOLD | OUTPATIENT
Start: 2021-01-01 | End: 2021-01-01 | Stop reason: HOSPADM

## 2021-01-01 RX ORDER — HEPARIN SODIUM 1000 [USP'U]/ML
60 INJECTION, SOLUTION INTRAVENOUS; SUBCUTANEOUS PRN
Status: DISCONTINUED | OUTPATIENT
Start: 2021-01-01 | End: 2021-01-01

## 2021-01-01 RX ORDER — CITALOPRAM 40 MG/1
40 TABLET ORAL DAILY
Qty: 90 TABLET | Refills: 1 | Status: SHIPPED
Start: 2021-01-01 | End: 2021-01-01 | Stop reason: SDUPTHER

## 2021-01-01 RX ORDER — METOPROLOL SUCCINATE 25 MG/1
25 TABLET, EXTENDED RELEASE ORAL 2 TIMES DAILY
Status: DISCONTINUED | OUTPATIENT
Start: 2021-01-01 | End: 2021-01-01 | Stop reason: HOSPADM

## 2021-01-01 RX ORDER — DEXAMETHASONE SODIUM PHOSPHATE 10 MG/ML
10 INJECTION INTRAMUSCULAR; INTRAVENOUS EVERY 12 HOURS
Status: DISCONTINUED | OUTPATIENT
Start: 2021-01-01 | End: 2021-01-01 | Stop reason: HOSPADM

## 2021-01-01 RX ORDER — ACETYLCYSTEINE 100 MG/ML
4 SOLUTION ORAL; RESPIRATORY (INHALATION) EVERY 4 HOURS
Status: DISCONTINUED | OUTPATIENT
Start: 2021-01-01 | End: 2021-01-01

## 2021-01-01 RX ORDER — MORPHINE SULFATE 2 MG/ML
2 INJECTION, SOLUTION INTRAMUSCULAR; INTRAVENOUS ONCE
Status: COMPLETED | OUTPATIENT
Start: 2021-01-01 | End: 2021-01-01

## 2021-01-01 RX ORDER — SODIUM CHLORIDE 0.9 % (FLUSH) 0.9 %
5-40 SYRINGE (ML) INJECTION EVERY 12 HOURS SCHEDULED
Status: DISCONTINUED | OUTPATIENT
Start: 2021-01-01 | End: 2021-01-01 | Stop reason: HOSPADM

## 2021-01-01 RX ORDER — ATORVASTATIN CALCIUM 20 MG/1
20 TABLET, FILM COATED ORAL NIGHTLY
Qty: 90 TABLET | Refills: 1 | Status: ON HOLD
Start: 2021-01-01 | End: 2021-01-01 | Stop reason: HOSPADM

## 2021-01-01 RX ORDER — ALBUTEROL SULFATE 2.5 MG/3ML
2.5 SOLUTION RESPIRATORY (INHALATION) 4 TIMES DAILY
Status: DISCONTINUED | OUTPATIENT
Start: 2021-01-01 | End: 2021-01-01

## 2021-01-01 RX ORDER — ATORVASTATIN CALCIUM 40 MG/1
40 TABLET, FILM COATED ORAL NIGHTLY
Status: DISCONTINUED | OUTPATIENT
Start: 2021-01-01 | End: 2021-01-01 | Stop reason: HOSPADM

## 2021-01-01 RX ORDER — DEXAMETHASONE SODIUM PHOSPHATE 10 MG/ML
10 INJECTION INTRAMUSCULAR; INTRAVENOUS EVERY 24 HOURS
Status: DISCONTINUED | OUTPATIENT
Start: 2021-11-28 | End: 2021-01-01 | Stop reason: HOSPADM

## 2021-01-01 RX ORDER — ONDANSETRON 2 MG/ML
4 INJECTION INTRAMUSCULAR; INTRAVENOUS EVERY 6 HOURS PRN
Status: DISCONTINUED | OUTPATIENT
Start: 2021-01-01 | End: 2021-01-01 | Stop reason: HOSPADM

## 2021-01-01 RX ORDER — LEVOTHYROXINE SODIUM 0.03 MG/1
25 TABLET ORAL DAILY
Status: DISCONTINUED | OUTPATIENT
Start: 2021-01-01 | End: 2021-01-01

## 2021-01-01 RX ORDER — ZINC SULFATE 50(220)MG
50 CAPSULE ORAL DAILY
Status: DISCONTINUED | OUTPATIENT
Start: 2021-01-01 | End: 2021-01-01

## 2021-01-01 RX ORDER — SODIUM CHLORIDE 9 MG/ML
INJECTION, SOLUTION INTRAVENOUS EVERY 8 HOURS
Status: DISCONTINUED | OUTPATIENT
Start: 2021-01-01 | End: 2021-01-01 | Stop reason: ALTCHOICE

## 2021-01-01 RX ORDER — FAMOTIDINE 20 MG/1
40 TABLET, FILM COATED ORAL DAILY PRN
Status: ON HOLD | COMMUNITY
End: 2021-01-01 | Stop reason: HOSPADM

## 2021-01-01 RX ORDER — GLYCOPYRROLATE 0.2 MG/ML
0.4 INJECTION INTRAMUSCULAR; INTRAVENOUS EVERY 6 HOURS PRN
Status: DISCONTINUED | OUTPATIENT
Start: 2021-01-01 | End: 2021-01-01 | Stop reason: HOSPADM

## 2021-01-01 RX ORDER — PROCHLORPERAZINE MALEATE 10 MG
10 TABLET ORAL EVERY 6 HOURS PRN
Status: ON HOLD | COMMUNITY
End: 2021-01-01 | Stop reason: HOSPADM

## 2021-01-01 RX ORDER — PREDNISONE 10 MG/1
10 TABLET ORAL DAILY
Status: ON HOLD | COMMUNITY
End: 2021-01-01 | Stop reason: HOSPADM

## 2021-01-01 RX ORDER — ASPIRIN 81 MG/1
81 TABLET, CHEWABLE ORAL NIGHTLY
Status: DISCONTINUED | OUTPATIENT
Start: 2021-01-01 | End: 2021-01-01

## 2021-01-01 RX ORDER — FLUCONAZOLE 150 MG/1
150 TABLET ORAL ONCE
Qty: 2 TABLET | Refills: 0 | Status: SHIPPED | OUTPATIENT
Start: 2021-01-01 | End: 2021-01-01

## 2021-01-01 RX ORDER — CITALOPRAM 20 MG/1
20 TABLET ORAL DAILY
Status: DISCONTINUED | OUTPATIENT
Start: 2021-01-01 | End: 2021-01-01

## 2021-01-01 RX ORDER — ALPRAZOLAM 0.25 MG/1
0.25 TABLET ORAL DAILY PRN
Status: ON HOLD | COMMUNITY
End: 2021-01-01 | Stop reason: HOSPADM

## 2021-01-01 RX ORDER — GUAIFENESIN/DEXTROMETHORPHAN 100-10MG/5
5 SYRUP ORAL EVERY 4 HOURS PRN
Status: DISCONTINUED | OUTPATIENT
Start: 2021-01-01 | End: 2021-01-01 | Stop reason: HOSPADM

## 2021-01-01 RX ORDER — HEPARIN SODIUM 10000 [USP'U]/100ML
12 INJECTION, SOLUTION INTRAVENOUS CONTINUOUS
Status: DISCONTINUED | OUTPATIENT
Start: 2021-01-01 | End: 2021-01-01

## 2021-01-01 RX ORDER — ASCORBIC ACID 500 MG
500 TABLET ORAL DAILY
Status: DISCONTINUED | OUTPATIENT
Start: 2021-01-01 | End: 2021-01-01

## 2021-01-01 RX ORDER — GLYCOPYRROLATE 0.2 MG/ML
0.4 INJECTION INTRAMUSCULAR; INTRAVENOUS EVERY 4 HOURS PRN
Status: DISCONTINUED | OUTPATIENT
Start: 2021-01-01 | End: 2021-01-01

## 2021-01-01 RX ADMIN — LORAZEPAM 0.5 MG: 2 INJECTION INTRAMUSCULAR; INTRAVENOUS at 10:57

## 2021-01-01 RX ADMIN — ATORVASTATIN CALCIUM 20 MG: 20 TABLET, FILM COATED ORAL at 00:38

## 2021-01-01 RX ADMIN — ATORVASTATIN CALCIUM 20 MG: 20 TABLET, FILM COATED ORAL at 21:13

## 2021-01-01 RX ADMIN — ACETAMINOPHEN 650 MG: 325 TABLET ORAL at 13:43

## 2021-01-01 RX ADMIN — MIDAZOLAM HYDROCHLORIDE 0.5 MG: 1 INJECTION, SOLUTION INTRAMUSCULAR; INTRAVENOUS at 13:50

## 2021-01-01 RX ADMIN — ACETAMINOPHEN 650 MG: 325 TABLET ORAL at 10:15

## 2021-01-01 RX ADMIN — Medication 2000 UNITS: at 08:28

## 2021-01-01 RX ADMIN — CITALOPRAM HYDROBROMIDE 20 MG: 20 TABLET ORAL at 08:04

## 2021-01-01 RX ADMIN — HYDROCODONE BITARTRATE AND ACETAMINOPHEN 1 TABLET: 5; 325 TABLET ORAL at 21:15

## 2021-01-01 RX ADMIN — LORAZEPAM 0.5 MG: 2 INJECTION INTRAMUSCULAR; INTRAVENOUS at 21:47

## 2021-01-01 RX ADMIN — ASPIRIN 81 MG CHEWABLE TABLET 81 MG: 81 TABLET CHEWABLE at 00:39

## 2021-01-01 RX ADMIN — APIXABAN 5 MG: 5 TABLET, FILM COATED ORAL at 08:04

## 2021-01-01 RX ADMIN — Medication 10 ML: at 19:53

## 2021-01-01 RX ADMIN — MORPHINE SULFATE 2 MG: 2 INJECTION, SOLUTION INTRAMUSCULAR; INTRAVENOUS at 02:03

## 2021-01-01 RX ADMIN — ACETAMINOPHEN 650 MG: 325 TABLET ORAL at 14:39

## 2021-01-01 RX ADMIN — CITALOPRAM HYDROBROMIDE 20 MG: 20 TABLET ORAL at 08:29

## 2021-01-01 RX ADMIN — ACETAMINOPHEN 650 MG: 325 TABLET ORAL at 01:19

## 2021-01-01 RX ADMIN — LEVOTHYROXINE SODIUM 25 MCG: 25 TABLET ORAL at 05:49

## 2021-01-01 RX ADMIN — APIXABAN 5 MG: 5 TABLET, FILM COATED ORAL at 08:59

## 2021-01-01 RX ADMIN — Medication 30 MILLICURIE: at 08:08

## 2021-01-01 RX ADMIN — ASPIRIN 81 MG CHEWABLE TABLET 81 MG: 81 TABLET CHEWABLE at 09:25

## 2021-01-01 RX ADMIN — BARICITINIB 2 MG: 2 TABLET, FILM COATED ORAL at 14:19

## 2021-01-01 RX ADMIN — LEVOTHYROXINE SODIUM 25 MCG: 25 TABLET ORAL at 06:39

## 2021-01-01 RX ADMIN — LORAZEPAM 0.5 MG: 2 INJECTION INTRAMUSCULAR; INTRAVENOUS at 00:54

## 2021-01-01 RX ADMIN — SODIUM CHLORIDE, PRESERVATIVE FREE 10 ML: 5 INJECTION INTRAVENOUS at 14:57

## 2021-01-01 RX ADMIN — DEXAMETHASONE 6 MG: 1 TABLET ORAL at 08:27

## 2021-01-01 RX ADMIN — ASPIRIN 81 MG CHEWABLE TABLET 81 MG: 81 TABLET CHEWABLE at 09:00

## 2021-01-01 RX ADMIN — DEXAMETHASONE SODIUM PHOSPHATE 10 MG: 10 INJECTION INTRAMUSCULAR; INTRAVENOUS at 14:58

## 2021-01-01 RX ADMIN — CEFEPIME HYDROCHLORIDE 2000 MG: 2 INJECTION, POWDER, FOR SOLUTION INTRAVENOUS at 17:33

## 2021-01-01 RX ADMIN — Medication 10 ML: at 23:31

## 2021-01-01 RX ADMIN — TC 99M MEDRONATE 25 MILLICURIE: 20 INJECTION, POWDER, LYOPHILIZED, FOR SOLUTION INTRAVENOUS at 11:05

## 2021-01-01 RX ADMIN — Medication 10 ML: at 00:39

## 2021-01-01 RX ADMIN — LORAZEPAM 0.5 MG: 2 INJECTION INTRAMUSCULAR; INTRAVENOUS at 08:41

## 2021-01-01 RX ADMIN — LORAZEPAM 0.5 MG: 2 INJECTION INTRAMUSCULAR; INTRAVENOUS at 23:23

## 2021-01-01 RX ADMIN — SODIUM CHLORIDE 1878 ML: 9 INJECTION, SOLUTION INTRAVENOUS at 13:58

## 2021-01-01 RX ADMIN — MORPHINE SULFATE 2 MG: 2 INJECTION, SOLUTION INTRAMUSCULAR; INTRAVENOUS at 10:57

## 2021-01-01 RX ADMIN — HYDROCODONE BITARTRATE AND ACETAMINOPHEN 1 TABLET: 5; 325 TABLET ORAL at 16:09

## 2021-01-01 RX ADMIN — METOPROLOL SUCCINATE 25 MG: 25 TABLET, FILM COATED, EXTENDED RELEASE ORAL at 20:58

## 2021-01-01 RX ADMIN — ASPIRIN 81 MG CHEWABLE TABLET 324 MG: 81 TABLET CHEWABLE at 02:24

## 2021-01-01 RX ADMIN — FAMOTIDINE 40 MG: 20 TABLET, FILM COATED ORAL at 17:19

## 2021-01-01 RX ADMIN — LEVOTHYROXINE SODIUM 25 MCG: 25 TABLET ORAL at 09:25

## 2021-01-01 RX ADMIN — ZINC SULFATE 220 MG (50 MG) CAPSULE 50 MG: CAPSULE at 08:28

## 2021-01-01 RX ADMIN — LORAZEPAM 0.5 MG: 2 INJECTION INTRAMUSCULAR; INTRAVENOUS at 03:51

## 2021-01-01 RX ADMIN — HEPARIN SODIUM 1910 UNITS: 1000 INJECTION, SOLUTION INTRAVENOUS; SUBCUTANEOUS at 20:53

## 2021-01-01 RX ADMIN — Medication 10 ML: at 09:01

## 2021-01-01 RX ADMIN — IOPAMIDOL 75 ML: 755 INJECTION, SOLUTION INTRAVENOUS at 15:04

## 2021-01-01 RX ADMIN — HEPARIN SODIUM 14 UNITS/KG/HR: 10000 INJECTION, SOLUTION INTRAVENOUS at 20:54

## 2021-01-01 RX ADMIN — ACETAMINOPHEN 650 MG: 650 SUPPOSITORY RECTAL at 08:13

## 2021-01-01 RX ADMIN — ALPRAZOLAM 0.25 MG: 0.25 TABLET ORAL at 22:03

## 2021-01-01 RX ADMIN — Medication 10 ML: at 09:25

## 2021-01-01 RX ADMIN — PANTOPRAZOLE SODIUM 40 MG: 40 INJECTION, POWDER, FOR SOLUTION INTRAVENOUS at 11:20

## 2021-01-01 RX ADMIN — FENTANYL CITRATE 25 MCG: 50 INJECTION, SOLUTION INTRAMUSCULAR; INTRAVENOUS at 13:50

## 2021-01-01 RX ADMIN — ASPIRIN 81 MG 81 MG: 81 TABLET ORAL at 20:39

## 2021-01-01 RX ADMIN — METOPROLOL SUCCINATE 25 MG: 25 TABLET, EXTENDED RELEASE ORAL at 08:28

## 2021-01-01 RX ADMIN — LEVOTHYROXINE SODIUM 25 MCG: 25 TABLET ORAL at 06:36

## 2021-01-01 RX ADMIN — ZINC SULFATE 220 MG (50 MG) CAPSULE 50 MG: CAPSULE at 10:27

## 2021-01-01 RX ADMIN — HEPARIN SODIUM 12 UNITS/KG/HR: 10000 INJECTION, SOLUTION INTRAVENOUS at 03:10

## 2021-01-01 RX ADMIN — PIPERACILLIN AND TAZOBACTAM 3375 MG: 3; .375 INJECTION, POWDER, LYOPHILIZED, FOR SOLUTION INTRAVENOUS at 12:22

## 2021-01-01 RX ADMIN — LIDOCAINE HYDROCHLORIDE 15 ML: 20 INJECTION, SOLUTION INFILTRATION; PERINEURAL at 14:08

## 2021-01-01 RX ADMIN — LORAZEPAM 0.5 MG: 2 INJECTION INTRAMUSCULAR; INTRAVENOUS at 17:57

## 2021-01-01 RX ADMIN — ACETAMINOPHEN 650 MG: 325 TABLET ORAL at 11:49

## 2021-01-01 RX ADMIN — APIXABAN 5 MG: 5 TABLET, FILM COATED ORAL at 21:13

## 2021-01-01 RX ADMIN — HEPARIN SODIUM 3810 UNITS: 1000 INJECTION INTRAVENOUS; SUBCUTANEOUS at 04:34

## 2021-01-01 RX ADMIN — REMDESIVIR 200 MG: 100 INJECTION, POWDER, LYOPHILIZED, FOR SOLUTION INTRAVENOUS at 16:38

## 2021-01-01 RX ADMIN — AZITHROMYCIN 500 MG: 500 INJECTION, POWDER, LYOPHILIZED, FOR SOLUTION INTRAVENOUS at 14:20

## 2021-01-01 RX ADMIN — ONDANSETRON 4 MG: 2 INJECTION INTRAMUSCULAR; INTRAVENOUS at 23:39

## 2021-01-01 RX ADMIN — Medication 10 ML: at 20:58

## 2021-01-01 RX ADMIN — METOPROLOL SUCCINATE 25 MG: 25 TABLET, FILM COATED, EXTENDED RELEASE ORAL at 14:40

## 2021-01-01 RX ADMIN — DICYCLOMINE HYDROCHLORIDE 10 MG: 20 INJECTION, SOLUTION INTRAMUSCULAR at 23:41

## 2021-01-01 RX ADMIN — DESMOPRESSIN ACETATE 40 MG: 0.2 TABLET ORAL at 19:53

## 2021-01-01 RX ADMIN — REGADENOSON 0.4 MG: 0.08 INJECTION, SOLUTION INTRAVENOUS at 09:30

## 2021-01-01 RX ADMIN — DEXAMETHASONE SODIUM PHOSPHATE 6 MG: 4 INJECTION, SOLUTION INTRAMUSCULAR; INTRAVENOUS at 14:20

## 2021-01-01 RX ADMIN — DESMOPRESSIN ACETATE 40 MG: 0.2 TABLET ORAL at 20:58

## 2021-01-01 RX ADMIN — MORPHINE SULFATE 2 MG: 2 INJECTION, SOLUTION INTRAMUSCULAR; INTRAVENOUS at 13:17

## 2021-01-01 RX ADMIN — APIXABAN 5 MG: 5 TABLET, FILM COATED ORAL at 00:39

## 2021-01-01 RX ADMIN — LEVOTHYROXINE SODIUM 25 MCG: 25 TABLET ORAL at 11:44

## 2021-01-01 RX ADMIN — VANCOMYCIN HYDROCHLORIDE 1250 MG: 10 INJECTION, POWDER, LYOPHILIZED, FOR SOLUTION INTRAVENOUS at 20:16

## 2021-01-01 RX ADMIN — SODIUM CHLORIDE: 9 INJECTION, SOLUTION INTRAVENOUS at 19:23

## 2021-01-01 RX ADMIN — CITALOPRAM HYDROBROMIDE 40 MG: 20 TABLET ORAL at 11:44

## 2021-01-01 RX ADMIN — GLYCOPYRROLATE 0.4 MG: 0.2 INJECTION INTRAMUSCULAR; INTRAVENOUS at 08:41

## 2021-01-01 RX ADMIN — FAMOTIDINE 40 MG: 20 TABLET, FILM COATED ORAL at 16:10

## 2021-01-01 RX ADMIN — LEVOTHYROXINE SODIUM 25 MCG: 25 TABLET ORAL at 14:41

## 2021-01-01 RX ADMIN — MORPHINE SULFATE 2 MG: 2 INJECTION, SOLUTION INTRAMUSCULAR; INTRAVENOUS at 05:14

## 2021-01-01 RX ADMIN — Medication 10 MILLICURIE: at 08:07

## 2021-01-01 RX ADMIN — PANTOPRAZOLE SODIUM 40 MG: 40 INJECTION, POWDER, FOR SOLUTION INTRAVENOUS at 08:28

## 2021-01-01 RX ADMIN — METOPROLOL SUCCINATE 25 MG: 25 TABLET, FILM COATED, EXTENDED RELEASE ORAL at 11:44

## 2021-01-01 RX ADMIN — GLYCOPYRROLATE 0.4 MG: 0.2 INJECTION INTRAMUSCULAR; INTRAVENOUS at 14:57

## 2021-01-01 RX ADMIN — APIXABAN 5 MG: 5 TABLET, FILM COATED ORAL at 08:28

## 2021-01-01 RX ADMIN — IOPAMIDOL 75 ML: 755 INJECTION, SOLUTION INTRAVENOUS at 22:50

## 2021-01-01 RX ADMIN — Medication 10 ML: at 20:42

## 2021-01-01 RX ADMIN — CITALOPRAM HYDROBROMIDE 40 MG: 20 TABLET ORAL at 08:59

## 2021-01-01 RX ADMIN — Medication 10 ML: at 08:04

## 2021-01-01 RX ADMIN — Medication 10 ML: at 08:29

## 2021-01-01 RX ADMIN — LORAZEPAM 0.5 MG: 2 INJECTION INTRAMUSCULAR; INTRAVENOUS at 16:23

## 2021-01-01 RX ADMIN — DESMOPRESSIN ACETATE 40 MG: 0.2 TABLET ORAL at 20:40

## 2021-01-01 RX ADMIN — BARICITINIB 2 MG: 2 TABLET, FILM COATED ORAL at 08:27

## 2021-01-01 RX ADMIN — GRANULES FOR ORAL SOLUTION 1 PACKET: 3 POWDER ORAL at 01:16

## 2021-01-01 RX ADMIN — SODIUM CHLORIDE 500 ML: 9 INJECTION, SOLUTION INTRAVENOUS at 23:39

## 2021-01-01 RX ADMIN — DEXAMETHASONE SODIUM PHOSPHATE 10 MG: 10 INJECTION INTRAMUSCULAR; INTRAVENOUS at 08:41

## 2021-01-01 RX ADMIN — METOPROLOL SUCCINATE 25 MG: 25 TABLET, FILM COATED, EXTENDED RELEASE ORAL at 20:41

## 2021-01-01 RX ADMIN — METOPROLOL SUCCINATE 25 MG: 25 TABLET, EXTENDED RELEASE ORAL at 08:04

## 2021-01-01 RX ADMIN — METOPROLOL SUCCINATE 25 MG: 25 TABLET, EXTENDED RELEASE ORAL at 21:13

## 2021-01-01 RX ADMIN — ASPIRIN 81 MG 81 MG: 81 TABLET ORAL at 19:48

## 2021-01-01 RX ADMIN — DEXAMETHASONE 6 MG: 1 TABLET ORAL at 21:13

## 2021-01-01 RX ADMIN — Medication 2000 UNITS: at 00:44

## 2021-01-01 RX ADMIN — METOPROLOL SUCCINATE 25 MG: 25 TABLET, FILM COATED, EXTENDED RELEASE ORAL at 19:53

## 2021-01-01 RX ADMIN — HYDROCODONE BITARTRATE AND ACETAMINOPHEN 1 TABLET: 5; 325 TABLET ORAL at 05:49

## 2021-01-01 RX ADMIN — APIXABAN 5 MG: 5 TABLET, FILM COATED ORAL at 20:57

## 2021-01-01 RX ADMIN — FAMOTIDINE 40 MG: 20 TABLET, FILM COATED ORAL at 20:41

## 2021-01-01 RX ADMIN — HEPARIN SODIUM 3810 UNITS: 1000 INJECTION INTRAVENOUS; SUBCUTANEOUS at 03:09

## 2021-01-01 RX ADMIN — SODIUM CHLORIDE: 9 INJECTION, SOLUTION INTRAVENOUS at 21:40

## 2021-01-01 RX ADMIN — APIXABAN 5 MG: 5 TABLET, FILM COATED ORAL at 09:25

## 2021-01-01 RX ADMIN — SODIUM CHLORIDE 3000 MG: 900 INJECTION INTRAVENOUS at 14:20

## 2021-01-01 RX ADMIN — ASPIRIN 81 MG CHEWABLE TABLET 81 MG: 81 TABLET CHEWABLE at 14:40

## 2021-01-01 RX ADMIN — MORPHINE SULFATE 2 MG: 2 INJECTION, SOLUTION INTRAMUSCULAR; INTRAVENOUS at 06:51

## 2021-01-01 RX ADMIN — OXYCODONE HYDROCHLORIDE AND ACETAMINOPHEN 500 MG: 500 TABLET ORAL at 10:27

## 2021-01-01 RX ADMIN — IOPAMIDOL 75 ML: 755 INJECTION, SOLUTION INTRAVENOUS at 12:54

## 2021-01-01 RX ADMIN — LORAZEPAM 0.5 MG: 2 INJECTION INTRAMUSCULAR; INTRAVENOUS at 19:43

## 2021-01-01 RX ADMIN — ASPIRIN 81 MG 81 MG: 81 TABLET ORAL at 20:58

## 2021-01-01 RX ADMIN — Medication 10 ML: at 11:50

## 2021-01-01 RX ADMIN — LORAZEPAM 0.5 MG: 2 INJECTION INTRAMUSCULAR; INTRAVENOUS at 13:17

## 2021-01-01 RX ADMIN — Medication 10 ML: at 09:05

## 2021-01-01 RX ADMIN — CITALOPRAM HYDROBROMIDE 40 MG: 20 TABLET ORAL at 11:28

## 2021-01-01 RX ADMIN — BUDESONIDE AND FORMOTEROL FUMARATE DIHYDRATE 2 PUFF: 160; 4.5 AEROSOL RESPIRATORY (INHALATION) at 12:22

## 2021-01-01 RX ADMIN — Medication 2000 UNITS: at 08:04

## 2021-01-01 RX ADMIN — OXYCODONE HYDROCHLORIDE AND ACETAMINOPHEN 500 MG: 500 TABLET ORAL at 08:28

## 2021-01-01 RX ADMIN — Medication 10 ML: at 23:00

## 2021-01-01 RX ADMIN — ALPRAZOLAM 0.25 MG: 0.25 TABLET ORAL at 04:09

## 2021-01-01 RX ADMIN — CITALOPRAM HYDROBROMIDE 40 MG: 20 TABLET ORAL at 14:41

## 2021-01-01 RX ADMIN — MORPHINE SULFATE 2 MG: 2 INJECTION, SOLUTION INTRAMUSCULAR; INTRAVENOUS at 05:24

## 2021-01-01 RX ADMIN — DEXAMETHASONE SODIUM PHOSPHATE 10 MG: 10 INJECTION INTRAMUSCULAR; INTRAVENOUS at 19:43

## 2021-01-01 RX ADMIN — ALPRAZOLAM 0.25 MG: 0.25 TABLET ORAL at 05:30

## 2021-01-01 RX ADMIN — METOPROLOL SUCCINATE 25 MG: 25 TABLET, FILM COATED, EXTENDED RELEASE ORAL at 09:25

## 2021-01-01 RX ADMIN — ASPIRIN 81 MG CHEWABLE TABLET 81 MG: 81 TABLET CHEWABLE at 21:13

## 2021-01-01 SDOH — ECONOMIC STABILITY: FOOD INSECURITY: WITHIN THE PAST 12 MONTHS, THE FOOD YOU BOUGHT JUST DIDN'T LAST AND YOU DIDN'T HAVE MONEY TO GET MORE.: NEVER TRUE

## 2021-01-01 SDOH — ECONOMIC STABILITY: TRANSPORTATION INSECURITY
IN THE PAST 12 MONTHS, HAS THE LACK OF TRANSPORTATION KEPT YOU FROM MEDICAL APPOINTMENTS OR FROM GETTING MEDICATIONS?: NO

## 2021-01-01 ASSESSMENT — ENCOUNTER SYMPTOMS
WHEEZING: 0
BACK PAIN: 0
DIARRHEA: 0
SHORTNESS OF BREATH: 0
BACK PAIN: 0
SORE THROAT: 0
VOMITING: 0
CONSTIPATION: 0
VOMITING: 0
ABDOMINAL PAIN: 0
BACK PAIN: 0
SHORTNESS OF BREATH: 1
EYE PAIN: 0
ABDOMINAL PAIN: 1
PHOTOPHOBIA: 0
APNEA: 0
ABDOMINAL PAIN: 0
GASTROINTESTINAL NEGATIVE: 1
BLOOD IN STOOL: 0
STRIDOR: 0
TROUBLE SWALLOWING: 0
DIARRHEA: 0
EYE REDNESS: 0
COUGH: 0
CHEST TIGHTNESS: 0
FACIAL SWELLING: 0
COUGH: 0
SHORTNESS OF BREATH: 0
VOMITING: 0
NAUSEA: 0
NAUSEA: 1
SORE THROAT: 0
NAUSEA: 0
SINUS PRESSURE: 0
WHEEZING: 0
VOMITING: 0
EYE PAIN: 0
COLOR CHANGE: 0
SHORTNESS OF BREATH: 0
VOMITING: 1
BACK PAIN: 0
NAUSEA: 0
ALLERGIC/IMMUNOLOGIC NEGATIVE: 1
ABDOMINAL PAIN: 1
CHEST TIGHTNESS: 0
NAUSEA: 1
COUGH: 0
DIARRHEA: 0
SHORTNESS OF BREATH: 0
COUGH: 1
SORE THROAT: 0
SINUS PAIN: 0
WHEEZING: 0
SHORTNESS OF BREATH: 0
ABDOMINAL PAIN: 0
GASTROINTESTINAL NEGATIVE: 1
RESPIRATORY NEGATIVE: 1
DIARRHEA: 0

## 2021-01-01 ASSESSMENT — PAIN SCALES - GENERAL
PAINLEVEL_OUTOF10: 8
PAINLEVEL_OUTOF10: 0
PAINLEVEL_OUTOF10: 0
PAINLEVEL_OUTOF10: 8
PAINLEVEL_OUTOF10: 5
PAINLEVEL_OUTOF10: 0
PAINLEVEL_OUTOF10: 8
PAINLEVEL_OUTOF10: 8
PAINLEVEL_OUTOF10: 0
PAINLEVEL_OUTOF10: 6
PAINLEVEL_OUTOF10: 6
PAINLEVEL_OUTOF10: 7
PAINLEVEL_OUTOF10: 5
PAINLEVEL_OUTOF10: 0
PAINLEVEL_OUTOF10: 8
PAINLEVEL_OUTOF10: 0
PAINLEVEL_OUTOF10: 8
PAINLEVEL_OUTOF10: 0
PAINLEVEL_OUTOF10: 0
PAINLEVEL_OUTOF10: 6
PAINLEVEL_OUTOF10: 8
PAINLEVEL_OUTOF10: 0
PAINLEVEL_OUTOF10: 4
PAINLEVEL_OUTOF10: 0
PAINLEVEL_OUTOF10: 0

## 2021-01-01 ASSESSMENT — PAIN DESCRIPTION - LOCATION
LOCATION: ABDOMEN
LOCATION: ABDOMEN
LOCATION: HEAD
LOCATION: ABDOMEN

## 2021-01-01 ASSESSMENT — PAIN DESCRIPTION - PAIN TYPE
TYPE: ACUTE PAIN

## 2021-01-01 ASSESSMENT — PATIENT HEALTH QUESTIONNAIRE - PHQ9
SUM OF ALL RESPONSES TO PHQ QUESTIONS 1-9: 2
SUM OF ALL RESPONSES TO PHQ QUESTIONS 1-9: 2
1. LITTLE INTEREST OR PLEASURE IN DOING THINGS: 1
2. FEELING DOWN, DEPRESSED OR HOPELESS: 1
SUM OF ALL RESPONSES TO PHQ9 QUESTIONS 1 & 2: 2
SUM OF ALL RESPONSES TO PHQ QUESTIONS 1-9: 2

## 2021-01-01 ASSESSMENT — PAIN - FUNCTIONAL ASSESSMENT
PAIN_FUNCTIONAL_ASSESSMENT: ACTIVITIES ARE NOT PREVENTED

## 2021-01-01 ASSESSMENT — PAIN DESCRIPTION - ORIENTATION
ORIENTATION: RIGHT

## 2021-01-01 ASSESSMENT — PAIN DESCRIPTION - PROGRESSION
CLINICAL_PROGRESSION: NOT CHANGED
CLINICAL_PROGRESSION: GRADUALLY WORSENING
CLINICAL_PROGRESSION: NOT CHANGED

## 2021-01-01 ASSESSMENT — PAIN DESCRIPTION - FREQUENCY
FREQUENCY: CONTINUOUS

## 2021-01-01 ASSESSMENT — LIFESTYLE VARIABLES: HOW OFTEN DO YOU HAVE A DRINK CONTAINING ALCOHOL: 0

## 2021-01-01 ASSESSMENT — PAIN DESCRIPTION - DESCRIPTORS
DESCRIPTORS: ACHING
DESCRIPTORS: ACHING;DISCOMFORT
DESCRIPTORS: ACHING;DISCOMFORT

## 2021-01-01 ASSESSMENT — PAIN DESCRIPTION - ONSET
ONSET: ON-GOING
ONSET: GRADUAL
ONSET: ON-GOING

## 2021-01-01 ASSESSMENT — PAIN SCALES - WONG BAKER: WONGBAKER_NUMERICALRESPONSE: 0

## 2021-02-09 NOTE — PROGRESS NOTES
Chief Complaint:  Hypertension (Pt had cataract surgery yest, BP was elevated)    History of Present Illness:  Source of history provided by:  patient. Peggy Palomino is a 79 y.o. old female who  has a past medical history of Anxiety, Back skin lesion, Boil, Cancer ([address] Depression, Hyperlipidemia, and Hypothyroidism. Presents for evaluation of elevated blood pressure. Had cataract surgery yesterday and it was high. 190/110 prior to surgery and 144/90 after surgery. Today it is 136/80. She does not have a history of HTN and does not take BP medications. Feels well overall. Stress is minimal but does state that she was anxious to get surgery over. Last BW was in October. TSH was normal. Pt denies any current HA, CP, SOB, urinary difficulties, neck stiffness, dizziness, ringing of the ears, visual changes, syncope, or lethargy. Review of Systems:     Unless otherwise stated in this report or unable to obtain because of the patient's clinical or mental status as evidenced by the medical record, this patients's positive and negative responses for Review of Systems, constitutional, psych, eyes, ENT, cardiovascular, respiratory, gastrointestinal, neurological, genitourinary, musculoskeletal, integument systems and systems related to the presenting problem are either stated in the preceding or were not pertinent or were negative for the symptoms and/or complaints related to the medical problem. Past Surgical History:  has a past surgical history that includes Hysterectomy (1989); Breast surgery (Right, 1987); Breast surgery (Left, 1989); and other surgical history (5/10/2013). Social History:  reports that she quit smoking about 31 years ago. She has a 25.00 pack-year smoking history. She has never used smokeless tobacco. She reports that she does not drink alcohol or use drugs.

## 2021-03-02 NOTE — TELEPHONE ENCOUNTER
Had 2nd Cataract surgery Friday and having problems with elevated BP-can't get it back down. 162/98, 154/38-requesting a call back.      Apt made for [facility] tomorrow 03/03/21

## 2021-03-03 NOTE — PROGRESS NOTES
3/21/2021        Nima Mcdonald (: 1940 IS A 79 y.o. female ,Established patient, here for eval of Hypertension (Re--check for elevated BP readings)          Current Outpatient Medications   Medication Sig Dispense Refill    fluconazole (DIFLUCAN) 150 MG tablet Take 150 mg by mouth daily       ALPRAZolam (XANAX) 0.25 MG tablet Take 1 tablet by mouth daily as needed for Sleep for up to 90 days. 90 tablet 0    famotidine (PEPCID) 40 MG tablet Take 1 tablet by mouth every evening 90 tablet 0    levothyroxine (SYNTHROID) 25 MCG tablet Take 1 tablet by mouth Daily 90 tablet 1    atorvastatin (LIPITOR) 20 MG tablet Take 1 tablet by mouth nightly 90 tablet 1    citalopram (CELEXA) 40 MG tablet Take 1 tablet by mouth daily Instructed to take with sip water am of procedure 90 tablet 1    aspirin 81 MG tablet Take 81 mg by mouth nightly. LAST DOSE 13      ibuprofen (ADVIL;MOTRIN) 600 MG tablet Take 1 tablet by mouth every 6 hours as needed for Pain (Patient not taking: Reported on 3/3/2021) 60 tablet 1     No current facility-administered medications for this visit. Lives alone since  jack   Lives in [facility]    tv dinners    Had cataracts done on the   and   Waiting to get glasses        132/80    ASSESSMENT / PLAN      1. Hyperlipidemia, unspecified hyperlipidemia type  Hyperlipidemia: Patient presents with hyperlipidemia. She was tested because level is elevatedd. Her last labs showed Total cholesterol of 110, HDL 31, LDL 61,  Triglycerides 101. none. There is not a family history of hyperlipidemia. There is not a family history of early ischemia heart disease. She is on lipitor 20   /cl  Stable, cont meds recheck 6 mos        2. Reactive depression  Subjective:       Nima Mcdonald is a 79 y.o. female who presents for follow up of depression. Current symptoms include anhedonia, depressed mood, fatigue and insomnia. Symptoms have been stable since that time.  Patient denies feelings of worthlessness/guilt, hopelessness, suicidal attempt, suicidal thoughts with specific plan and suicidal thoughts without plan. Previous treatment includes: medication. She complains of the following side effects from the treatment: none. General:  alert, appears stated age and cooperative   Affect & Behavior:  full facial expressions, good grooming, good insight, normal perception, normal reasoning, normal speech pattern and content and normal thought patterns ,           Plan:      Medications: Celexa. 3. Anxiety  Subjective:       Caryl Edgar is a 79 y.o. female who presents for follow up of anxiety disorder. Current symptoms: none. She denies current suicidal and homicidal ideation. She complains of the following side effects from the treatment: none. Stable, cont meds recheck 6 mos     Medications: Xanax. 4. Hypothyroidism, unspecified type  Subjective:       Caryl Edgar is a 79 y.o. female who presents for follow up of hypothyroidism. Current symptoms: none. Patient denies change in energy level, diarrhea, heat / cold intolerance, nervousness, palpitations and weight changes. Symptoms have stabilized. Laboratory:  Lab Results   Component Value Date    TSH 2.59 10/02/2019         Assessment:      Hypothyroidism. Replacement levothyroxine 25 mcg. Plan:      1. L-thyroxine per orders. 2. Recheck thyroid function tests in 6 months. 3. Instructed not to take multivitamins or iron within 4 hours of taking thyroid medications. 4. Follow up in 6 months. Stable, cont meds recheck 6 mos                SUBJECTIVE    Review of Systems   Constitutional: Positive for fatigue. Negative for activity change, appetite change and fever. Respiratory: Negative for shortness of breath, wheezing and stridor. Cardiovascular: Negative for chest pain. Gastrointestinal: Negative. Musculoskeletal: Negative for arthralgias, back pain, gait problem and myalgias.    Skin: Negative. Neurological: Negative. Psychiatric/Behavioral: Positive for dysphoric mood. OBJECTIVE    Wt Readings from Last 3 Encounters:   03/03/21 142 lb 6.4 oz (64.6 kg)   02/09/21 142 lb 6.4 oz (64.6 kg)   10/12/20 141 lb (64 kg)       Vitals:    03/03/21 1050   BP: 118/70   Pulse:    Resp:    Temp:    SpO2:        Physical Exam  Constitutional:       Appearance: Normal appearance. Cardiovascular:      Rate and Rhythm: Normal rate and regular rhythm. Pulses: Normal pulses. Heart sounds: Normal heart sounds. Pulmonary:      Effort: Pulmonary effort is normal.      Breath sounds: Normal breath sounds. Abdominal:      General: Bowel sounds are normal.      Palpations: Abdomen is soft. Musculoskeletal: Normal range of motion. Skin:     General: Skin is warm and dry. Neurological:      General: No focal deficit present. Mental Status: She is alert. Psychiatric:         Mood and Affect: Mood normal.         Thought Content: Thought content normal.         Judgment: Judgment normal.             No follow-ups on file. An electronic signature was used to authenticate this note.     Brad Mccullough BobovnyikMD    3/21/2021 Female

## 2021-04-14 NOTE — PROGRESS NOTES
No flowsheet data found. Interpretation of PEDRO-7 score: 5-9 = mild anxiety, 10-14 = moderate anxiety, 15+ = severe anxiety. Recommend referral to behavioral health for scores 10 or greater. 2021        Kennedy Yen (: 1940 IS A 79 y.o. female ,Established patient, here for eval of Hyperlipidemia (6 month check-up), Medication Refill, and Discuss Labs          Current Outpatient Medications   Medication Sig Dispense Refill    levothyroxine (SYNTHROID) 25 MCG tablet Take 1 tablet by mouth Daily 90 tablet 1    atorvastatin (LIPITOR) 20 MG tablet Take 1 tablet by mouth nightly 90 tablet 1    citalopram (CELEXA) 40 MG tablet Take 1 tablet by mouth daily Instructed to take with sip water am of procedure 90 tablet 1    ALPRAZolam (XANAX) 0.25 MG tablet Take 1 tablet by mouth daily as needed for Sleep for up to 90 days. 90 tablet 0    famotidine (PEPCID) 40 MG tablet Take 1 tablet by mouth every evening 90 tablet 0    aspirin 81 MG tablet Take 81 mg by mouth nightly. LAST DOSE 13      fluconazole (DIFLUCAN) 150 MG tablet Take 150 mg by mouth daily       ibuprofen (ADVIL;MOTRIN) 600 MG tablet Take 1 tablet by mouth every 6 hours as needed for Pain (Patient not taking: Reported on 3/3/2021) 60 tablet 1     No current facility-administered medications for this visit. Stress in life  Younger brother was in hospital  Apparently he has some mental issues with depression, substance abuse  Was in hospital  His son  of drug overdose recently  He has no one to help so she feels like she needs to be there for him        ASSESSMENT / PLAN      1. Acquired hypothyroidism  Hypothyroidism: Recent symptoms: none. She denies none. Patient is  taking her medication consistently on an empty stomach. No results found for: [facility]  Lab Results   Component Value Date    TSH 2.59 10/02/2019    TSH 3.03 2019    TSH 2.25 2018   - levothyroxine (SYNTHROID) 25 MCG tablet;  Take 1 tablet by mouth Daily  Dispense: 90 tablet; Refill: 1  - TSH without Reflex; Future  Stable, cont meds recheck 6 mos    2. Hyperlipidemia, unspecified hyperlipidemia type  Hyperlipidemia: Patient presents with hyperlipidemia. She was tested because . Her last labs showed Total cholesterol of 123, HDL 37, LDL 65,  Triglycerides 120. none. There is not a family history of hyperlipidemia. There is not a family history of early ischemia heart disease.    - atorvastatin (LIPITOR) 20 MG tablet; Take 1 tablet by mouth nightly  Dispense: 90 tablet; Refill: 1  - CBC Auto Differential; Future  - Lipid Panel; Future  Stable, cont meds recheck 6 mos    3. Depression, unspecified depression type  doindg well on meds no new ocmplaints  Stable, cont meds recheck 6 mos    - citalopram (CELEXA) 40 MG tablet; Take 1 tablet by mouth daily Instructed to take with sip water am of procedure  Dispense: 90 tablet; Refill: 1    4. Anxiety  Stable, cont meds recheck 6 mos    - citalopram (CELEXA) 40 MG tablet; Take 1 tablet by mouth daily Instructed to take with sip water am of procedure  Dispense: 90 tablet; Refill: 1    5. Elevated glucose  has been elevated over the last few times  Will check  a1c next lab  Stable, cont meds recheck 6 mos    - Comprehensive Metabolic Panel; Future  - Hemoglobin A1C; Future              SUBJECTIVE    Review of Systems   Constitutional: Negative for activity change, appetite change and fatigue. Respiratory: Negative. Cardiovascular: Negative. Gastrointestinal: Negative. Genitourinary: Negative. Musculoskeletal: Negative. Neurological: Negative. OBJECTIVE    Wt Readings from Last 3 Encounters:   04/14/21 144 lb 6.4 oz (65.5 kg)   03/03/21 142 lb 6.4 oz (64.6 kg)   02/09/21 142 lb 6.4 oz (64.6 kg)       Vitals:    04/14/21 1146   BP: 130/76   Pulse: 83   Resp: 16   Temp: 97.3 °F (36.3 °C)   SpO2: 96%       Physical Exam  Constitutional:       Appearance: Normal appearance.    Cardiovascular: Rate and Rhythm: Normal rate and regular rhythm. Pulses: Normal pulses. Heart sounds: Normal heart sounds. Pulmonary:      Effort: Pulmonary effort is normal.   Skin:     General: Skin is warm and dry. Neurological:      Mental Status: She is alert. No follow-ups on file. An electronic signature was used to authenticate this note.     Perry Noss BobovnyikMD    4/14/2021

## 2021-10-02 NOTE — PROGRESS NOTES
Rose Potter (:  1940) is a 79 y.o. female,Established patient, here for evaluation of the following chief complaint(s): Mass (Left lower leg. Painful,itchy. Noticed yesterday )         ASSESSMENT/PLAN:  1. Thrombophlebitis  Comments:  left lower leg  possibly form minor trauma  moist heat  f/u monday if not better      Return if symptoms worsen or fail to improve. Subjective   SUBJECTIVE/OBJECTIVE:  Patient here with small lump on left lower shin  It has been there a few days  Thought maybe she got bit by something  Not red  At times sore to touch,  On the front of left lower leg  No calf tenderness and no calf lumps  No tenderness in thigh or pain in her thigh  It is not affecting her walk  It is also itchy at times          Review of Systems   Constitutional: Negative for appetite change, fatigue and fever. HENT: Negative for congestion, ear pain, sinus pain, sore throat and trouble swallowing. Eyes: Negative for pain. Respiratory: Negative for cough, chest tightness, shortness of breath and wheezing. Cardiovascular: Negative for chest pain, palpitations and leg swelling. Gastrointestinal: Negative for abdominal pain, constipation, diarrhea, nausea and vomiting. Endocrine: Negative for cold intolerance and heat intolerance. Genitourinary: Negative for difficulty urinating, hematuria and pelvic pain. Musculoskeletal: Negative for back pain, gait problem and joint swelling. Skin: Negative for rash and wound. Lump on left lower shin, she is worried it is a clot   Neurological: Negative for dizziness, syncope and headaches. Hematological: Negative for adenopathy. Psychiatric/Behavioral: Negative for confusion, sleep disturbance and suicidal ideas. Objective   Physical Exam  Vitals and nursing note reviewed. Constitutional:       Appearance: She is well-developed. HENT:      Head: Normocephalic and atraumatic.    Eyes:      Pupils: Pupils are equal, round,

## 2021-10-05 NOTE — PROGRESS NOTES
Chief Complaint:   Chief Complaint   Patient presents with    Mass     Left leg pain and ankle. Leg Pain   The incident occurred 3 to 5 days ago. There was no injury mechanism. The pain is present in the left leg. The patient is experiencing no pain. The pain has been constant since onset. Patient Active Problem List   Diagnosis    Hyperlipidemia    Anxiety    Reactive depression    Hypothyroid    Grief reaction       Past Medical History:   Diagnosis Date    Anxiety     Back skin lesion     Boil     reomved from right shoulder    Cancer ([facility] [address]     RIGHT BREAST    Depression     STABLE ON MEDS    Hyperlipidemia     Hypothyroidism        Past Surgical History:   Procedure Laterality Date    BREAST SURGERY Right 1987    MASTECTOMY    BREAST SURGERY Left 1989    LEFT BREAST REDUCTION    HYSTERECTOMY  1989    OTHER SURGICAL HISTORY  5/10/2013    excision of cyst upper back        Current Outpatient Medications   Medication Sig Dispense Refill    apixaban starter pack (ELIQUIS DVT/PE STARTER PACK) 5 MG TBPK tablet Take 1 tablet by mouth See Admin Instructions 74 tablet 0    ALPRAZolam (XANAX) 0.25 MG tablet Take 1 tablet by mouth daily as needed for Sleep for up to 90 days. 90 tablet 0    levothyroxine (SYNTHROID) 25 MCG tablet Take 1 tablet by mouth Daily 90 tablet 1    atorvastatin (LIPITOR) 20 MG tablet Take 1 tablet by mouth nightly 90 tablet 1    citalopram (CELEXA) 40 MG tablet Take 1 tablet by mouth daily Instructed to take with sip water am of procedure 90 tablet 1    famotidine (PEPCID) 40 MG tablet Take 1 tablet by mouth every evening 90 tablet 0    aspirin 81 MG tablet Take 81 mg by mouth nightly. LAST DOSE 5/2/13       No current facility-administered medications for this visit. Allergies   Allergen Reactions    Duricef [Cefadroxil] Hives       Social History     Socioeconomic History    Marital status:       Spouse name: Not on file    Number of children: Not on file    Years of education: Not on file    Highest education level: Not on file   Occupational History    Not on file   Tobacco Use    Smoking status: Former Smoker     Packs/day: 1.00     Years: 25.00     Pack years: 25.00     Quit date: 1990     Years since quittin.7    Smokeless tobacco: Never Used   Substance and Sexual Activity    Alcohol use: No    Drug use: No    Sexual activity: Not on file   Other Topics Concern    Not on file   Social History Narrative    Not on file     Social Determinants of Health     Financial Resource Strain: Low Risk     Difficulty of Paying Living Expenses: Not hard at all   Food Insecurity: No Food Insecurity    Worried About [employer] [employer] in the Last Year: Never true    [address] in the Last Year: Never true   Transportation Needs: No Transportation Needs    Lack of Transportation (Medical): No    Lack of Transportation (Non-Medical): No   Physical Activity:     Days of Exercise per Week:     Minutes of Exercise per Session:    Stress:     Feeling of Stress :    Social Connections:     Frequency of Communication with Friends and Family:     Frequency of Social Gatherings with Friends and Family:     Attends Je[employer] witness Services:     Active Member of Clubs or Organizations:     Attends Club or Organization Meetings:     Marital Status:    Intimate Partner Violence:     Fear of Current or Ex-Partner:     Emotionally Abused:     Physically Abused:     Sexually Abused:        Family History   Problem Relation Age of Onset    Heart Disease Mother     Mental Illness Mother     Heart Disease Father     Heart Surgery Father         stents placed    Diabetes Father 80         Review of Systems   Constitutional: Negative. HENT: Negative for congestion, facial swelling, hearing loss, nosebleeds, sinus pressure and sore throat. Eyes: Negative for photophobia and visual disturbance.    Respiratory: Negative for apnea, cough, chest tightness, shortness of breath and wheezing. Cardiovascular: Positive for leg swelling. Negative for chest pain and palpitations. Gastrointestinal: Negative for abdominal pain, blood in stool, diarrhea, nausea and vomiting. Genitourinary: Negative for difficulty urinating, frequency and urgency. Musculoskeletal: Negative for arthralgias, back pain, joint swelling and myalgias. Skin: Negative for color change and rash. Allergic/Immunologic: Negative. Neurological: Negative for syncope, weakness, light-headedness and headaches. Hematological: Negative. Psychiatric/Behavioral: Negative for agitation, behavioral problems, confusion and self-injury. The patient is not nervous/anxious. All other systems reviewed and are negative. Physical Exam  Vitals and nursing note reviewed. Constitutional:       General: She is not in acute distress. Appearance: She is well-developed. HENT:      Head: Normocephalic and atraumatic. Nose: Nose normal.   Eyes:      Conjunctiva/sclera: Conjunctivae normal.      Pupils: Pupils are equal, round, and reactive to light. Neck:      Thyroid: No thyromegaly. Vascular: No JVD. Cardiovascular:      Rate and Rhythm: Normal rate and regular rhythm. Heart sounds: Normal heart sounds. No murmur heard. No friction rub. No gallop. Pulmonary:      Effort: Pulmonary effort is normal. No respiratory distress. Breath sounds: Normal breath sounds. No wheezing. Abdominal:      General: Bowel sounds are normal. There is no distension. Palpations: Abdomen is soft. Tenderness: There is no abdominal tenderness. There is no guarding or rebound. Musculoskeletal:         General: Normal range of motion. Cervical back: Normal range of motion and neck supple. Left lower leg: Swelling present. Legs:    Lymphadenopathy:      Cervical: No cervical adenopathy. Skin:     General: Skin is warm and dry.       Findings: No erythema or rash. Neurological:      Mental Status: She is alert and oriented to person, place, and time. Cranial Nerves: No cranial nerve deficit. Motor: No abnormal muscle tone. Coordination: Coordination normal.      Deep Tendon Reflexes: Reflexes are normal and symmetric. Psychiatric:         Behavior: Behavior normal.         Judgment: Judgment normal.                               ASSESSMENT/PLAN:    Amanda Bynum was seen today for mass. Diagnoses and all orders for this visit:    Leg swelling  -     US DUP LOWER EXTREMITY LEFT DASHAWN; Future    Lump of skin of left lower extremity  -     US EXTREMITY LEFT NON VASC LIMITED; Future    Acute deep vein thrombosis (DVT) of proximal vein of left lower extremity ([facility] [address]  -     US DUP LOWER EXTREMITY LEFT DASHAWN; Future  -     apixaban starter pack (ELIQUIS DVT/PE STARTER PACK) 5 MG TBPK tablet; Take 1 tablet by mouth See Admin Instructions  -     CBC Auto Differential; Future  -     Comprehensive Metabolic Panel; Future  -     PROTIME-INR;  Future  -     Evie Lopez MD, Vascular Surgery, [address]    10/5/2021  4:30 PM

## 2021-10-07 NOTE — TELEPHONE ENCOUNTER
Received referral from Dr. Demetria Ferguson for DVT, left message for patient to schedule appointment with Dr. Caleb Villanueva.

## 2021-10-08 NOTE — TELEPHONE ENCOUNTER
Left message for patient to call office to schedule appointment with Dr. Yue Patel (pt left message on [employer] for callback)

## 2021-10-15 NOTE — PROGRESS NOTES
Medicare Annual Wellness Visit  Name: Pascale Berkowitz Date: 10/24/2021   MRN: [id number] Sex: Female   Age: 79 y.o. Ethnicity: Non- / Non    : 1940 Race: White (non-)      Chapin Bettencourt is here for Medicare AWV, Hyperlipidemia (6 month check up), Thyroid Problem, Anxiety, and Depression    Screenings for behavioral, psychosocial and functional/safety risks, and cognitive dysfunction are all negative except as indicated below. These results, as well as other patient data from the [address] form, are documented in Flowsheets linked to this Encounter. Allergies   Allergen Reactions    Duricef [Cefadroxil] Hives         Prior to Visit Medications    Medication Sig Taking? Authorizing Provider   levothyroxine (SYNTHROID) 25 MCG tablet Take 1 tablet by mouth Daily Yes Jolie Bill MD   atorvastatin (LIPITOR) 20 MG tablet Take 1 tablet by mouth nightly Yes Jolie Bill MD   citalopram (CELEXA) 40 MG tablet Take 1 tablet by mouth daily Instructed to take with sip water am of procedure Yes Jolie Bill MD   ALPRAZolam Robbin Patience) 0.25 MG tablet Take 1 tablet by mouth daily as needed for Sleep for up to 90 days. Yes Jolie Bill MD   apixaban (ELIQUIS) 5 MG TABS tablet Take 1 tablet by mouth 2 times daily Yes Jolie Bill MD   apixaban starter pack (ELIQUIS DVT/PE STARTER PACK) 5 MG TBPK tablet Take 1 tablet by mouth See Admin Instructions Yes Jose Guillory DO   famotidine (PEPCID) 40 MG tablet Take 1 tablet by mouth every evening Yes Jolie Bill MD   aspirin 81 MG tablet Take 81 mg by mouth nightly.  LAST DOSE 13  Patient not taking: Reported on 10/15/2021  Historical Provider, MD         Past Medical History:   Diagnosis Date    Anxiety     Back skin lesion     Boil     reomved from right shoulder    Cancer ([facility] [address]     RIGHT BREAST    Depression     STABLE ON MEDS    Hyperlipidemia     Hypothyroidism        Past Surgical History: Procedure Laterality Date    BREAST SURGERY Right 1987    MASTECTOMY    BREAST SURGERY Left 1989    LEFT BREAST REDUCTION    HYSTERECTOMY  1989    OTHER SURGICAL HISTORY  5/10/2013    excision of cyst upper back          Family History   Problem Relation Age of Onset    Heart Disease Mother     Mental Illness Mother     Heart Disease Father     Heart Surgery Father         stents placed    Diabetes Father 80       CareTeam (Including outside providers/suppliers regularly involved in providing care):   Patient Care Team:  Trinity Kevin MD as PCP - General (Family Medicine)  Trinity Kevin MD as PCP - [facility] Empaneled Provider  Trinity Kevin MD as Consulting Physician (Family Medicine)    Wt Readings from Last 3 Encounters:   10/15/21 141 lb 9.6 oz (64.2 kg)   10/05/21 142 lb (64.4 kg)   10/02/21 144 lb (65.3 kg)     Vitals:    10/15/21 1115 10/15/21 1150   BP: 102/62 136/72   Site: Left Upper Arm    Position: Sitting    Cuff Size: Medium Adult    Pulse: 92    Resp: 16    Temp: 96.8 °F (36 °C)    TempSrc: Temporal    SpO2: 94%    Weight: 141 lb 9.6 oz (64.2 kg)    Height: 5' 2\" (1.575 m)      Body mass index is 25.9 kg/m². Based upon direct observation of the patient, evaluation of cognition reveals recent and remote memory intact.     General Appearance: alert and oriented to person, place and time, well developed and well- nourished, in no acute distress  Skin: warm and dry, no rash or erythema  Head: normocephalic and atraumatic  Eyes: pupils equal, round, and reactive to light, extraocular eye movements intact, conjunctivae normal  ENT: tympanic membrane, external ear and ear canal normal bilaterally, nose without deformity, nasal mucosa and turbinates normal without polyps  Neck: supple and non-tender without mass, no thyromegaly or thyroid nodules, no cervical lymphadenopathy  Pulmonary/Chest: clear to auscultation bilaterally- no wheezes, rales or rhonchi, normal air movement, no respiratory distress  Cardiovascular: normal rate, regular rhythm, normal S1 and S2, no murmurs, rubs, clicks, or gallops, distal pulses intact, no carotid bruits  Abdomen: soft, non-tender, non-distended, normal bowel sounds, no masses or organomegaly  Musculoskeletal: normal range of motion, no joint swelling, deformity or tenderness  Neurologic: reflexes normal and symmetric, no cranial nerve deficit, gait, coordination and speech normal    Patient's complete Health Risk Assessment and screening values have been reviewed and are found in Flowsheets. The following problems were reviewed today and where indicated follow up appointments were made and/or referrals ordered. Positive Risk Factor Screenings with Interventions:            General Health and ACP:  General  In general, how would you say your health is?: Very Good  In the past 7 days, have you experienced any of the following? New or Increased Pain, New or Increased Fatigue, Loneliness, Social Isolation, Stress or Anger?: (!) Stress, Loneliness  Do you get the social and emotional support that you need?: Yes  Do you have a Living Will?: Yes  Advance Directives     Power of  Living Will ACP-Advance Directive ACP-Power of     Not on File Not on File Not on File Not on File      General Health Risk Interventions:  · Stress: patient's comments regarding reasons for stress and/or anger: hard to be by herself  · deanne  a few years ago    Health Habits/Nutrition:  Health Habits/Nutrition  Do you exercise for at least 20 minutes 2-3 times per week?: (!) No  Have you lost any weight without trying in the past 3 months?: No  Do you eat only one meal per day?: No  Have you seen the dentist within the past year?: Yes  Body mass index: (!) 25.9  Health Habits/Nutrition Interventions:  · .     Hearing/Vision:  No exam data present  Hearing/Vision  Do you or your family notice any trouble with your hearing that hasn't been managed with hearing aids?: (!) Yes  Do you have difficulty driving, watching TV, or doing any of your daily activities because of your eyesight?: No  Have you had an eye exam within the past year?: Yes  Hearing/Vision Interventions:  · Hearing concerns:  patient declines any further evaluation/treatment for hearing issues      Personalized Preventive Plan   Current Health Maintenance Status  Immunization History   Administered Date(s) Administered    COVID-19, Joe Borden, PF, 30mcg/0.3mL 01/22/2021, 02/12/2021    Influenza Virus Vaccine 10/02/2015    Influenza, High Dose (Fluzone 65 yrs and older) 09/25/2014, 09/21/2016, 09/20/2017, 09/12/2018    Influenza, Quadv, adjuvanted, 65 yrs +, IM, PF (Fluad) 10/12/2020, 10/15/2021    Influenza, Triv, inactivated, subunit, adjuvanted, IM (Fluad 65 yrs and older) 10/07/2019    Pneumococcal Conjugate 13-valent ([id number]) 10/02/2015    Pneumococcal Polysaccharide ([id number]) 03/13/2017        Health Maintenance   Topic Date Due    DTaP/Tdap/Td vaccine (1 - Tdap) Never done    Shingles Vaccine (1 of 2) Never done    COVID-19 Vaccine (3 - Joe Borden booster) 08/12/2021    Annual Wellness Visit (AWV)  10/13/2021    Lipid screen  10/11/2022    TSH testing  10/11/2022    Flu vaccine  Completed    Pneumococcal 65+ years Vaccine  Completed    DEXA (modify frequency per FRAX score)  Addressed    Hepatitis A vaccine  Aged Out    Hepatitis B vaccine  Aged Out    Hib vaccine  Aged Out    Meningococcal (ACWY) vaccine  Aged Out     Recommendations for [employer] Due: see orders and patient instructions/AVS.  . Recommended screening schedule for the next 5-10 years is provided to the patient in written form: see Patient Instructions/AVS.    Maninder Cavazos was seen today for medicare awv, hyperlipidemia, thyroid problem, anxiety and depression.     Diagnoses and all orders for this visit:    Need for influenza vaccination  -     INFLUENZA, QUADV, ADJUVANTED, 65 YRS =, IM, PF, PREFILL SYR, 0.5ML (FLUAD)    Acquired hypothyroidism  Lab Results   Component Value Date    TSH 2.59 10/02/2019       -     levothyroxine (SYNTHROID) 25 MCG tablet; Take 1 tablet by mouth Daily    Hyperlipidemia, unspecified hyperlipidemia type  10/11/21 0000 CHOL 107  Final result   10/11/21 0000 TRIG 104  Final result   10/11/21 0000 HDL 27Important  Abnormal Final result   10/11/21 0000 LDLCALC 64  Final result       -     atorvastatin (LIPITOR) 20 MG tablet; Take 1 tablet by mouth nightly    Depression, unspecified depression type  -     citalopram (CELEXA) 40 MG tablet; Take 1 tablet by mouth daily Instructed to take with sip water am of procedure  -     ALPRAZolam (XANAX) 0.25 MG tablet; Take 1 tablet by mouth daily as needed for Sleep for up to 90 days. Anxiety  -     citalopram (CELEXA) 40 MG tablet; Take 1 tablet by mouth daily Instructed to take with sip water am of procedure  -     ALPRAZolam (XANAX) 0.25 MG tablet; Take 1 tablet by mouth daily as needed for Sleep for up to 90 days. Routine general medical examination at a health care facility    Reactive depression    Acute deep vein thrombosis (DVT) of calf muscle vein of left lower extremity (HCC)  Comments:  greater saphenous vein  Orders:  -     apixaban (ELIQUIS) 5 MG TABS tablet;  Take 1 tablet by mouth 2 times daily           Blood clota runin family  Woke up Friday with LLE pain  Dad , daughter brigid,  Pt's  brother with blood clots

## 2021-10-15 NOTE — PATIENT INSTRUCTIONS
Personalized Preventive Plan for Sussy Alexis - 10/15/2021  Medicare offers a range of preventive health benefits. Some of the tests and screenings are paid in full while other may be subject to a deductible, co-insurance, and/or copay. Some of these benefits include a comprehensive review of your medical history including lifestyle, illnesses that may run in your family, and various assessments and screenings as appropriate. After reviewing your medical record and screening and assessments performed today your provider may have ordered immunizations, labs, imaging, and/or referrals for you. A list of these orders (if applicable) as well as your Preventive Care list are included within your After Visit Summary for your review. Other Preventive Recommendations:    · A preventive eye exam performed by an eye specialist is recommended every 1-2 years to screen for glaucoma; cataracts, macular degeneration, and other eye disorders. · A preventive dental visit is recommended every 6 months. · Try to get at least 150 minutes of exercise per week or 10,000 steps per day on a pedometer . · Order or download the FREE \"Exercise & Physical Activity: Your Everyday Guide\" from [employer]. Call 5-409.215.8936 or search [employer] online. · You need 8323-7670 mg of calcium and 6493-5936 IU of vitamin D per day. It is possible to meet your calcium requirement with diet alone, but a vitamin D supplement is usually necessary to meet this goal.  · When exposed to the sun, use a sunscreen that protects against both UVA and UVB radiation with an SPF of 30 or greater. Reapply every 2 to 3 hours or after sweating, drying off with a towel, or swimming. · Always wear a seat belt when traveling in a car. Always wear a helmet when riding a bicycle or motorcycle.

## 2021-10-26 NOTE — TELEPHONE ENCOUNTER
Spoke to  Patient and she is to take pepcid now and later tonight and tomorrow morning. If more vomiting or diarrhea go to er. Can be seen in am  With Patrizia .

## 2021-10-26 NOTE — TELEPHONE ENCOUNTER
Colette Delacruzr daughter had appt with Lucia Frederick today. She said her Mom has not been feeling well. It started on Friday. With vomitting and diarrhea. She thought she had food posioning. She had a little toast today. Trying to stay hydrated. Drinking gatorade. She said she feels like she has a lump in stomach. Requesting appt for tomorrow. Should she see HungAnita?   Please advise

## 2021-10-27 NOTE — TELEPHONE ENCOUNTER
Codie Ocampo called re her mother, Kimani Rodriguez. She is taking her to ER. She's more confused, very weak, not eating, drinking a little bit. JUAN LUIS Hager's ph# [phone].

## 2021-10-27 NOTE — ED NOTES
FIRST PROVIDER CONTACT ASSESSMENT NOTE      Department of Emergency Medicine   10/27/21  2:45 PM EDT    Chief Complaint: Numbness (right rib ), Emesis (friday night), Nausea, and Fatigue      History of Present Illness:   Wesley Hall is a 79 y.o. female who presents to the ED for having nausea and emesis on Friday night after eating a salad. States she has been having headaches since then and right hand pain and numbness. She denies any falls or trauma. She is currently takes Eliquis and anticoagulation. Family member in triage states last known well was Tuesday morning when she started getting confusion and just overall weakness. Speech is clear in triage. She is in a wheelchair at this time. Medical History:  has a past medical history of Anxiety, Back skin lesion, Boil, Cancer ([address] Depression, Hyperlipidemia, and Hypothyroidism. Surgical History:  has a past surgical history that includes Hysterectomy (1989); Breast surgery (Right, 1987); Breast surgery (Left, 1989); and other surgical history (5/10/2013). Social History:  reports that she quit smoking about 31 years ago. She has a 25.00 pack-year smoking history. She has never used smokeless tobacco. She reports that she does not drink alcohol and does not use drugs. Family History: family history includes Diabetes (age of onset: 80) in her father; Heart Disease in her father and mother; Heart Surgery in her father; Mental Illness in her mother.     *ALLERGIES*     Duricef [cefadroxil]     Physical Exam:      VS:  BP (!) 158/92   Pulse 88   Temp 98 °F (36.7 °C)   Resp 16   Ht 5' 2\" (1.575 m)   Wt 140 lb (63.5 kg)   SpO2 95%   BMI 25.61 kg/m²      Initial Plan of Care:  Initiate Treatment-Testing, Proceed toTreatment Area When Bed Available for ED Attending/MLP to Continue Care    -----------------END OF FIRST PROVIDER CONTACT ASSESSMENT NOTE--------------  Electronically signed by ASTRID Capellan CNP   DD: 10/27/21             Yamileth Pickett

## 2021-10-28 PROBLEM — K86.89 PANCREATIC MASS: Status: ACTIVE | Noted: 2021-01-01

## 2021-10-28 PROBLEM — R16.0 LIVER MASSES: Status: ACTIVE | Noted: 2021-01-01

## 2021-10-28 PROBLEM — I21.4 NSTEMI (NON-ST ELEVATED MYOCARDIAL INFARCTION) (HCC): Status: ACTIVE | Noted: 2021-01-01

## 2021-10-28 NOTE — ED PROVIDER NOTES
Chief complaint: Nausea, vomiting, rib numbness      HPI:  10/28/21, Time: 2:12 AM EDT    HPI             Chapin Bettencourt is a 79 y.o. female presenting to the ED for nausea, vomiting, rib numbness. The history is obtained from the patient as well as the patient's medical record. The patient is presenting for a 4-day history of nausea, vomiting and rib numbness. The patient reports that she began approximately 4 days ago with nausea and vomiting. There is no hematemesis or coffee-ground emesis. The patient did have epigastric abdominal pain. She repeatedly states this felt like a \"knot. \"  It was mild in severity. Nothing made it better. Nothing made it worse. She denies any pain at the current time. She not tried any treatments prior to arrival.  She does also complain of numbness which is located over the right flank. She denies any chest pain or shortness of breath. Does have a history of hyperlipidemia. She has no history hypertension, diabetes. She is not a smoker. ROS:   Review of Systems   Constitutional: Positive for fatigue. Negative for chills. HENT: Negative for congestion. Eyes: Negative for redness. Respiratory: Negative for shortness of breath. Cardiovascular: Negative for chest pain. Gastrointestinal: Positive for abdominal pain, nausea and vomiting. Genitourinary: Negative for dysuria. Musculoskeletal: Negative for arthralgias. Skin: Negative for rash. Neurological: Negative for light-headedness. Psychiatric/Behavioral: Negative for confusion. All other systems reviewed and are negative.      --------------------------------------------- PAST HISTORY ---------------------------------------------  Past Medical History:  has a past medical history of Anxiety, Back skin lesion, Boil, Cancer ([facility] [address] Depression, Hyperlipidemia, and Hypothyroidism. Past Surgical History:  has a past surgical history that includes Hysterectomy (1989);  Breast surgery (Right, 1987); Breast surgery (Left, 1989); and other surgical history (5/10/2013). Social History:  reports that she quit smoking about 31 years ago. She has a 25.00 pack-year smoking history. She has never used smokeless tobacco. She reports that she does not drink alcohol and does not use drugs. Family History: family history includes Diabetes (age of onset: 80) in her father; Heart Disease in her father and mother; Heart Surgery in her father; Mental Illness in her mother. The patients home medications have been reviewed. Allergies: Duricef [cefadroxil]    ---------------------------------------------------PHYSICAL EXAM--------------------------------------        Constitutional/General: Alert and oriented x3, well appearing, non toxic in NAD  Head: Normocephalic and atraumatic  Mouth: Oropharynx clear, handling secretions, no trismus  Neck: Supple, full ROM,  Pulmonary: Lungs clear to auscultation bilaterally, no wheezes, rales, or rhonchi. Not in respiratory distress  Cardiovascular:  Regular rate. Regular rhythm. No murmurs  Chest: no chest wall tenderness  Abdomen: Soft. Non tender. Non distended. No rebound, guarding, or rigidity. No pulsatile masses appreciated. No overlying rashes  Musculoskeletal: Moves all extremities x 4. Warm and well perfused, no clubbing, cyanosis, or edema. Capillary refill <3 seconds  Skin: warm and dry. No rashes. Neurologic: GCS 15, no gross focal neurologic deficits  Psych: Normal Affect    -------------------------------------------------- RESULTS -------------------------------------------------  I have personally reviewed all laboratory and imaging results for this patient. Results are listed below.      LABS:  Results for orders placed or performed during the hospital encounter of 10/27/21   CBC Auto Differential   Result Value Ref Range    WBC 10.5 4.5 - 11.5 E9/L    RBC 4.90 3.50 - 5.50 E12/L    Hemoglobin 12.7 11.5 - 15.5 g/dL    Hematocrit 39.8 34.0 - 48.0 %    MCV 81.2 80.0 - 99.9 fL    MCH 25.9 (L) 26.0 - 35.0 pg    MCHC 31.9 (L) 32.0 - 34.5 %    RDW 15.6 (H) 11.5 - 15.0 fL    Platelets 186 (L) 409 - 450 E9/L    MPV 11.2 7.0 - 12.0 fL    Neutrophils % 77.8 43.0 - 80.0 %    Immature Granulocytes % 0.9 0.0 - 5.0 %    Lymphocytes % 11.2 (L) 20.0 - 42.0 %    Monocytes % 8.6 2.0 - 12.0 %    Eosinophils % 0.9 0.0 - 6.0 %    Basophils % 0.6 0.0 - 2.0 %    Neutrophils Absolute 8.16 (H) 1.80 - 7.30 E9/L    Immature Granulocytes # 0.09 E9/L    Lymphocytes Absolute 1.17 (L) 1.50 - 4.00 E9/L    Monocytes Absolute 0.90 0.10 - 0.95 E9/L    Eosinophils Absolute 0.09 0.05 - 0.50 E9/L    Basophils Absolute 0.06 0.00 - 0.20 W5/B   Basic Metabolic Panel w/ Reflex to MG   Result Value Ref Range    Sodium 132 132 - 146 mmol/L    Potassium reflex Magnesium 3.4 (L) 3.5 - 5.0 mmol/L    Chloride 95 (L) 98 - 107 mmol/L    CO2 24 22 - 29 mmol/L    Anion Gap 13 7 - 16 mmol/L    Glucose 125 (H) 74 - 99 mg/dL    BUN 15 6 - 23 mg/dL    CREATININE 1.1 (H) 0.5 - 1.0 mg/dL    GFR Non-African American 47 >=60 mL/min/1.73    GFR African American 57     Calcium 9.0 8.6 - 10.2 mg/dL   Troponin   Result Value Ref Range    Troponin, High Sensitivity 392 (H) 0 - 9 ng/L   Urinalysis, reflex to microscopic   Result Value Ref Range    Color, UA Yellow Straw/Yellow    Clarity, UA Clear Clear    Glucose, Ur Negative Negative mg/dL    Bilirubin Urine Negative Negative    Ketones, Urine TRACE (A) Negative mg/dL    Specific Gravity, UA 1.010 1.005 - 1.030    Blood, Urine TRACE-INTACT Negative    pH, UA 6.0 5.0 - 9.0    Protein, UA Negative Negative mg/dL    Urobilinogen, Urine 2.0 (A) <2.0 E.U./dL    Nitrite, Urine Negative Negative    Leukocyte Esterase, Urine MODERATE (A) Negative   Lactic Acid, Plasma   Result Value Ref Range    Lactic Acid 1.2 0.5 - 2.2 mmol/L   Magnesium   Result Value Ref Range    Magnesium 2.2 1.6 - 2.6 mg/dL   Microscopic Urinalysis   Result Value Ref Range    WBC, UA 10-20 (A) 0 - 5 /HPF    RBC, UA 1-3 0 - 2 /HPF    Epithelial Cells, UA MANY /HPF    Bacteria, UA MODERATE (A) None Seen /HPF   Troponin   Result Value Ref Range    Troponin, High Sensitivity 400 (H) 0 - 9 ng/L   Hepatic function panel   Result Value Ref Range    Total Protein 6.7 6.4 - 8.3 g/dL    Albumin 3.6 3.5 - 5.2 g/dL    Alkaline Phosphatase 138 (H) 35 - 104 U/L    ALT 19 0 - 32 U/L    AST 37 (H) 0 - 31 U/L    Total Bilirubin 1.1 0.0 - 1.2 mg/dL    Bilirubin, Direct 0.4 (H) 0.0 - 0.3 mg/dL    Bilirubin, Indirect 0.7 0.0 - 1.0 mg/dL   Lipase   Result Value Ref Range    Lipase 29 13 - 60 U/L   EKG 12 Lead   Result Value Ref Range    Ventricular Rate 79 BPM    Atrial Rate 79 BPM    P-R Interval 214 ms    QRS Duration 88 ms    Q-T Interval 410 ms    QTc Calculation (Bazett) 470 ms    P Axis 50 degrees    R Axis -11 degrees    T Axis 92 degrees       RADIOLOGY:  Interpreted by Radiologist.  CT Head WO Contrast   Final Result   No acute intracranial abnormality. CT ABDOMEN PELVIS W IV CONTRAST Additional Contrast? None   Final Result   Poorly defined hypodense lesion in the body and tail of the pancreas   measuring approximately 2.8 cm in diameter highly suspicious for malignancy. Dilatation of the pancreatic duct more distally in the tail of the pancreas. Multiple heterogeneous hypodense lesions in the liver consistent with   metastatic disease. Tiny nonspecific splenic hypodensities, too small to characterize. Metastatic involvement cannot be excluded. Multiple pulmonary nodules in the lung bases, the largest a spiculated   nodular lesion in the right lung base medially measuring 1.4 cm in diameter   suspicious for primary or metastatic malignancy. Further evaluation with   prompt complete CT of the chest recommended. Cholelithiasis. Colonic diverticulosis with no evidence of diverticulitis. Distended urinary bladder with diverticula which may represent sequela from   chronic outlet obstruction. XR RIBS RIGHT INCLUDE CHEST (MIN 3 VIEWS)   Final Result   No acute right rib cage pathology. 1.1 cm pulmonary nodule in the left mid lung. Given the lack of prior   radiographs for comparison recommend further characterization with follow-up   nonemergent CT of the chest.               EKG:  This EKG is signed and interpreted by me. Sinus rhythm first-degree AV block, rate of 79, no ST segment elevation or depression, ME interval 214 MS, QRS 88 MS,  MS  Interpreted by me      ------------------------- NURSING NOTES AND VITALS REVIEWED ---------------------------   The nursing notes within the ED encounter and vital signs as below have been reviewed by myself. BP (!) 162/84   Pulse 73   Temp 97.2 °F (36.2 °C)   Resp 16   Ht 5' 2\" (1.575 m)   Wt 140 lb (63.5 kg)   SpO2 94%   BMI 25.61 kg/m²   Oxygen Saturation Interpretation: Normal    The patients available past medical records and past encounters were reviewed.         ------------------------------ ED COURSE/MEDICAL DECISION MAKING----------------------  Medications   aspirin chewable tablet 324 mg (has no administration in time range)   heparin (porcine) injection 3,810 Units (has no administration in time range)   heparin (porcine) injection 3,810 Units (has no administration in time range)   heparin (porcine) injection 1,910 Units (has no administration in time range)   heparin 25,000 units in dextrose 5% 250 mL (premix) infusion (has no administration in time range)   iopamidol (ISOVUE-370) 76 % injection 75 mL (75 mLs IntraVENous Given 10/27/21 2250)   0.9 % sodium chloride bolus (0 mLs IntraVENous Stopped 10/28/21 0031)   dicyclomine (BENTYL) injection 10 mg (10 mg IntraMUSCular Given 10/27/21 2341)   ondansetron (ZOFRAN) injection 4 mg (4 mg IntraVENous Given 10/27/21 2339)   fosfomycin tromethamine (MONUROL) 3 g PACK 1 packet (1 packet Oral Given 10/28/21 0116)             Medical Decision Making:   I, Dr. Kings Lora am the primary physician of record. Delia Vigil is a 79 y.o. female who presents to the ED for nausea, vomiting and abdominal pain. Differential diagnose includes but not limited to MI, ACS, NSTEMI, pancreatitis, pancreatic mass, cholecystitis. The patient did have labs and imaging which were reviewed. Patient has CBC fairly unremarkable, CMP fairly unremarkable, high-sensitivity troponin elevated at 392, no ischemic changes on EKG, cardiology consultation. They recommend aspirin and heparin drip. The patient can be admitted at our facility. Chest x-ray with no acute process, CT abdomen pelvis did demonstrate pancreatic mass with concern for malignancy. The patient and daughter were educated on these findings. They are in agreement for admission at our facility. The patient will be admitted for further treatment and evaluation. Re-Evaluations/Consultations: The patient is at the bed no acute distress. The results of today were discussed. The patient continues to deny any chest pain in the emergency department. Spoke with Dr. Hussain Lehman. He will accept the patient for admission pending cardiology consultation     Spoke with Dr. Marco Colmenares for cardiology at this point he does recommend aspirin and a heparin drip. Patient can stay at our facility          This patient's ED course included: History, physical examination, reevaluation prior to disposition, labs, imaging, telemetry monitoring, EKG, IV medications      This patient has remained hemodynamically stable during their ED course. Critical care:  Critical Care: Please note that the withdrawal or failure to initiate urgent interventions for this patient would likely result in a life threatening deterioration or permanent disability. Accordingly this patient received 34 minutes of critical care time, excluding separately billable procedures. Counseling:    The emergency provider has spoken with the patient and daughter and discussed todays results, in addition to providing specific details for the plan of care and counseling regarding the diagnosis and prognosis. Questions are answered at this time and they are agreeable with the plan.       --------------------------------- IMPRESSION AND DISPOSITION ---------------------------------    IMPRESSION  1. NSTEMI (non-ST elevated myocardial infarction) ([facility] [address]    2. Pancreatic mass        DISPOSITION  Disposition: Admit to telemetry  Patient condition is stable        NOTE: This report was transcribed using voice recognition software.  Every effort was made to ensure accuracy; however, inadvertent computerized transcription errors may be present          Shaheed Hernandez DO  10/28/21 0217

## 2021-10-28 NOTE — H&P
[id number] [address]                              HISTORY AND PHYSICAL    PATIENT NAME: Tiffany Weber                     :        1940  MED REC NO:   [id number]                            ROOM:       25  ACCOUNT NO:   [de-identified]                           ADMIT DATE: 10/27/2021  PROVIDER:     Gladis Rodriguez MD    DATE OF ADMISSION:  10/27/2021. CHIEF COMPLAINT:  Non-ST-elevation myocardial infarction, also possible  pancreatic mass and lung masses. HISTORY OF PRESENT ILLNESS:  This is an 79year-old who presented to the  emergency room with complaints of nausea  and not feeling well for  several days. At that time, her troponins were in the 400s with  evidence of a non-STEMI. The patient also had CAT scan, which showed a  pancreatic mass and possible liver mets and lung involvement. The  patient at present is on heparin. She has been seen by the cardiologist  at present and denies any chest pain or shortness of breath. RECENT AND PRESENT MEDICATIONS:  See med rec in the chart. PAST MEDICAL HISTORY:  Positive for hypothyroidism and hyperlipidemia. SOCIAL HISTORY:  Quit smoking a month ago. Smoked for over 60 years. Denies alcohol use. FAMILY MENTAL HISTORY:  Noncontributory. REVIEW OF SYSTEMS:  ALLERGIES:  To DURICEF  SKIN AND LYMPHATICS:  Negative. CENTRAL NERVOUS SYSTEM:  Negative. CIRCULATORY:  Denied actual chest pain. Did have nausea and just was  not feeling well. Denies any dysuria, hematuria, or frequency. Did  complain of nausea as stated above. PHYSICAL EXAMINATION:  GENERAL:  Lying in bed. She is in no acute distress at this point. Denies any chest pain or shortness of breath. VITAL SIGNS:  Respirations 16, pulse 71, blood pressure 116/64, O2 sat  on room air is 91%. SKIN:  Shows pallor, but no jaundice. HEENT:  Eyes:  Reveal no icterus.   CHEST:  Clear to auscultation. HEART:  Distant, but regular rate and rhythm. ABDOMEN:  Soft and nontender. EXTREMITIES:  Lower extremities reveal no cyanosis, clubbing, or edema. There is no calf tenderness. NEUROLOGIC:  She is alert and oriented x3 with no focal neurological  deficits. LABORATORY STUDIES:  Troponin earlier this morning was 400, up from 392. CMP:  Renal function is normal.  Alkaline phosphatase elevated at 138. AST elevated slightly at 37. White count 10.3, hemoglobin 11.9, and  platelet count is 344,688. DIAGNOSTIC STUDIES:  Once again, CT scan showed a poorly defined  hypodense lesion in the body and tail of the pancreas, highly suspicious  for malignancy with dilatation of the pancreatic duct, multiple lesions  in the liver consistent with possible metastatic disease, multiple  pulmonary nodules, also possibly metastatic disease, cholelithiasis, and  diverticulosis without diverticulitis. CAT scan of the head was normal.  EKG with shows sinus rhythm with first-degree AV block, voltage criteria  for left ventricular hypertrophy, and abnormal QRS and T-wave  abnormality, abnormal EKG. IMPRESSION:  Non-STEMI. Also pancreatic mass, possible metastatic  cancer. Recent DVT lower extremity on Eliquis  PLAN:  The patient is still in the ER waiting a telemetry bed. She has  been started on heparin by Cardiology. Cardiology to see her at  present. Also I believe ER consulted hepatologist regarding her other  CAT scan findings. Discharge plan will be home once stable.   Eliquis discontinued secondary to being on heparin      Gallo Song MD    D: 10/28/2021 8:55:27       T: 10/28/2021 8:59:10     /[id number]  Job#: [id number]     Doc#: [id number]    CC:

## 2021-10-28 NOTE — FLOWSHEET NOTE
Spoke with patient's nurse in emergency room, discussed need for PT/INR to be drawn and heparin to be stopped 6 hours prior to liver biopsy. RN stated patient took eliquis last evening but unsure of time.

## 2021-10-28 NOTE — ED NOTES
Carrol Aguiar [phone] daughter would like update when room available. Yolie Pino [phone] daughter.      Deirdre Sorensen RN  10/28/21 7038

## 2021-10-28 NOTE — ED NOTES
Report faxed. Aishwarya Silvestre at ext [phone] confirmed receipt. Patient and family updated.  Patient can be transported via cart and RN and monitor     Justin Aguirre RN  10/28/21 7757

## 2021-10-28 NOTE — ED NOTES
Assumed care of patient . Introduced self to patient as RN. Updated patient on condition. Changed patients linens, repositioned for comfort. Pure Wic placed on patient. Call light within reach.      Newton Cervantes RN  10/28/21 6125

## 2021-10-28 NOTE — CONSULTS
Inpatient Cardiology Consultation      Reason for Consult:  NSTEMI    Consulting Physician: Dr Wilson Wagner     Requesting Physician:  Dr Harvinder Koch    Date of Consultation: 10/28/2021    HISTORY OF PRESENT ILLNESS:   Ms Ester Leslie is an 60-year-old female who is not previously known to [address] [place] [address] cardiology. Past medical history includes hyperlipidemia, hypothyroidism, depression/anxiety, previous tobacco abuse, remote right breath cancer s/p mastectomy/chemotherapy, DVT left (10/5/2021) on Eliquis. Presented to [facility] 10/27/2021 with nausea and vomiting  VS: 98-88-16-95%RA-158/92   Labs: WBC 10.5, H&H 12.7/39.8, platelets 198. K+ 3.4, BUN/SCR 15/1.1, glucose 125. Troponin 392    UA positive  CT head no intracranial abnormalities  CT abdomen pelvis hypodense lesion in the body and tail of the pancreas measuring 2.8 cm highly suspicious for malignancy with distal dilation of the pancreatic duct. Multiple hypodense lesions of the liver consistent with metastatic disease. Tiny nonspecific splenic hypodensities. Multiple pulmonary nodules in the lung bases  XR right ribs: 1.1 cm pulmonary nodule at the left midlung. She was given aspirin and started on heparin drip. She was admitted to a telemetry monitoring floor. Cardiology was asked see the patient for an NSTEMI. She states that for about 2-3 weeks she has been having abdominal pain, intermittently, primarily at night. It radiates across her epigastric region, not associated with exertion or rest. For the last week she has had N/V with diarrhea and poor PO intake. She has not been able to tolerate any food and was concerned she was not getting any better so she came to the emergency room. She denies any chest pain, palpitations, orthopnea, PND, peripheral edema, lightheadedness, dizziness, falls or LOC. Please note: past medical records were reviewed per electronic medical record (EMR) - see detailed reports under Past Medical/ Surgical History. dermatitis or ulcers   NEURO / PSYCH: Oriented to person, place and time. Speech clear and appropriate. Follows all commands. Pleasant affect     DATA:    ECG: SR HR 80 non specific T wave changes   Tele strips:  SR   Diagnostic:      Labs:   CBC:   Recent Labs     10/27/21  2155 10/28/21  0222   WBC 10.5 10.3   HGB 12.7 11.9   HCT 39.8 37.3   * 119*     BMP:   Recent Labs     10/27/21  2155      K 3.4*   CO2 24   BUN 15   CREATININE 1.1*   LABGLOM 47   CALCIUM 9.0     Mag:   Recent Labs     10/27/21  2155   MG 2.2   HgA1c:   Lab Results   Component Value Date    LABA1C 7.1 10/11/2021       APTT:  Recent Labs     10/28/21  0222   APTT 27.7     FASTING LIPID PANEL:  Lab Results   Component Value Date    CHOL 107 10/11/2021    HDL 27 10/11/2021    LDLCALC 61 10/11/2021    TRIG 104 10/11/2021     LIVER PROFILE:  Recent Labs     10/28/21  0051   AST 37*   ALT 19   LABALBU 3.6       CT Head WO Contrast 10/27/2021  Final Result  No acute intracranial abnormality.        CT ABDOMEN PELVIS W IV CONTRAST 1/27/2021  Final Result  Poorly defined hypodense lesion in the body and tail of the pancreas  measuring approximately 2.8 cm in diameter highly suspicious for malignancy. Dilatation of the pancreatic duct more distally in the tail of the pancreas.     Multiple heterogeneous hypodense lesions in the liver consistent with  metastatic disease.     Tiny nonspecific splenic hypodensities, too small to characterize. Metastatic involvement cannot be excluded.     Multiple pulmonary nodules in the lung bases, the largest a spiculated  nodular lesion in the right lung base medially measuring 1.4 cm in diameter  suspicious for primary or metastatic malignancy.   Further evaluation with  prompt complete CT of the chest recommended.     Cholelithiasis.     Colonic diverticulosis with no evidence of diverticulitis.     Distended urinary bladder with diverticula which may represent sequela from  chronic outlet obstruction.        XR RIBS RIGHT INCLUDE CHEST (MIN 3 VIEWS) 10/27/2021  Final Result  No acute right rib cage pathology.     1.1 cm pulmonary nodule in the left mid lung. Given the lack of prior  radiographs for comparison recommend further characterization with follow-up  nonemergent CT of the chest.        Brief assessment and plan   1. NSTEMI, patient denies chest pain   2. Nausea / vomiting with epigastric pain   3. Pancreatic body/tail lesion with multiple liver lesions and pulmonary lesions highly suspicious for malignancy on CT Ab/Pelvis 10/27/2021.  4. Hypertension   5. H/o tobacco abuse   6. Hypothyroidism  7. DVT 10/5/2021, on Eliquis   ·   · Will not be able to interrupt DAPT if she were to require intervention with [facility] if any biopsy interventions planned for the near future with CT findings. Plan for medical management for NSTEMI for now. · ASA 81mg daily   · Lipitor 40mg daily, monitor LFTs  · Continue Heparin IV ; hold Eliquis   · Start Toprol XL 25mg BID, monitor HR/BP   · Above discussed with Dr Lizet Weldon, full A&P to follow.          Electronically signed by Greg Carroll on 10/28/2021 at 7:53 AM

## 2021-10-28 NOTE — CONSULTS
[facility] SURGERY  CONSULT NOTE  10/28/2021    Physician Consulted: Dr. Clyde Arreola  Reason for Consult: Pancreatic and liver masses  Referring Physician: Dr. Theresa Howell    HPI  Cinthia Ontiveros is a 79 y.o. female with PMH breast cancer s/p mastectomy followed by chemo, recently unprovoked DVT of LLE (on Eliquis) who presented to the ED with one week of malaise, nausea, and vomiting. She was also found to have NSTEMI. Her abdominal symptoms started last Friday after patient ate a salad for dinner. She initially thought it was food poisoning and was hoping it wound subside, but it persisted. She has continued to feel like she has a knot in her stomach, with diffuse non-radiating pain although it has improved today. She has early satiety and little PO intolerance. She is currently on a heparin drip with plans for stress test tomorrow. CT A/P showing hypodense lesion in body and tail of pancreas as well as multiple heterogeneous hypodense lesions in liver consistent with metastatic disease. Tbili 1.1, direct bili 0.4. Past Medical History:   Diagnosis Date    Anxiety     Back skin lesion     Boil     reomved from right shoulder    Cancer ([facility] [address]     RIGHT BREAST    Depression     STABLE ON MEDS    Hyperlipidemia     Hypothyroidism        Past Surgical History:   Procedure Laterality Date    BREAST SURGERY Right 1987    MASTECTOMY    BREAST SURGERY Left 1989    LEFT BREAST REDUCTION    HYSTERECTOMY  1989    OTHER SURGICAL HISTORY  5/10/2013    excision of cyst upper back        Medications Prior to Admission:    Prior to Admission medications    Medication Sig Start Date End Date Taking? Authorizing Provider   ALPRAZolam Otelia Adjutant) 0.25 MG tablet Take 0.25 mg by mouth daily as needed for Sleep or Anxiety.    Yes Historical Provider, MD   citalopram (CELEXA) 40 MG tablet Take 40 mg by mouth daily   Yes Historical Provider, MD   famotidine (PEPCID) 20 MG tablet Take 40 mg by mouth daily as needed (HEARTBURN)   Yes Historical Provider, MD   levothyroxine (SYNTHROID) 25 MCG tablet Take 1 tablet by mouth Daily 10/15/21  Yes Miguel Angel Brothers MD   atorvastatin (LIPITOR) 20 MG tablet Take 1 tablet by mouth nightly 10/15/21  Yes Miguel Angel Brothers MD   apixaban (ELIQUIS) 5 MG TABS tablet Take 1 tablet by mouth 2 times daily 10/15/21 11/14/21 Yes Miguel Angel Brothers MD   apixaban starter pack (ELIQUIS DVT/PE STARTER PACK) 5 MG TBPK tablet Take 1 tablet by mouth See Admin Instructions 10/5/21  Yes Alessandra Rear, DO       Allergies   Allergen Reactions    Duricef [Cefadroxil] Hives       Family History   Problem Relation Age of Onset    Heart Disease Mother     Mental Illness Mother     Heart Disease Father     Heart Surgery Father         stents placed    Diabetes Father 80       Social History     Tobacco Use    Smoking status: Former Smoker     Packs/day: 1.00     Years: 25.00     Pack years: 25.00     Quit date: 1990     Years since quittin.8    Smokeless tobacco: Never Used   Vaping Use    Vaping Use: Never used   Substance Use Topics    Alcohol use: No    Drug use: No         Review of Systems   General ROS: positive - fatigue  Hematological and Lymphatic ROS: positive for - fatigue  Respiratory ROS: negative  Cardiovascular ROS: negative  Gastrointestinal ROS: positive for - abdominal pain and nausea/vomiting  Genito-Urinary ROS: negative  Musculoskeletal ROS: negative      PHYSICAL EXAM:    Vitals:    10/28/21 1338   BP: 134/63   Pulse: 75   Resp: 16   Temp:    SpO2: 95%       General Appearance:  awake, alert, oriented, in no acute distress  Skin:  Skin color, texture, turgor normal. No rashes or lesions. Head/face:  NCAT  Eyes:  No gross abnormalities. Lungs:  Normal respiratory effort on room air  Heart:  Heart regular rate and rhythm  Abdomen:  Soft, non-distended, mildly tender to palpation RUQ and RLQ  Extremities: Extremities warm to touch, pink, with no edema.   Female Rectal: Rectal exam deferred    LABS:  CBC  Recent Labs     10/28/21  0222   WBC 10.3   HGB 11.9   HCT 37.3   *     BMP  Recent Labs     10/27/21  2155      K 3.4*   CL 95*   CO2 24   BUN 15   CREATININE 1.1*   CALCIUM 9.0     Liver Function  Recent Labs     10/28/21  0051   LIPASE 29   BILITOT 1.1   BILIDIR 0.4*   AST 37*   ALT 19   ALKPHOS 138*   PROT 6.7   LABALBU 3.6     No results for input(s): LACTATE in the last 72 hours. No results for input(s): INR, PTT in the last 72 hours. Invalid input(s): PT    RADIOLOGY  XR RIBS RIGHT INCLUDE CHEST (MIN 3 VIEWS)    Result Date: 10/27/2021  EXAMINATION: XRAY VIEWS OF THE RIGHT RIBS WITH FRONTAL XRAY VIEW OF THE CHEST 10/27/2021 5:01 pm COMPARISON: May 2017 HISTORY: ORDERING SYSTEM PROVIDED HISTORY: pain and numbness on right ribs TECHNOLOGIST PROVIDED HISTORY: Reason for exam:->pain and numbness on right ribs FINDINGS: Cardiac silhouette is not enlarged. There is a right breast prosthesis in place. No evidence of acute right rib cage fracture or deformity. No sclerotic or lytic bony lesion. No pneumothorax. Old fracture of the proximal right humerus. Small nodule in the left mid lung not clearly calcified and not characterized on this examination. This measures approximately 1.1 cm in diameter. Given the lack of prior radiographs for comparison further characterization with CT of the chest is recommended. No acute right rib cage pathology. 1.1 cm pulmonary nodule in the left mid lung.   Given the lack of prior radiographs for comparison recommend further characterization with follow-up nonemergent CT of the chest.     CT Head WO Contrast    Result Date: 10/27/2021  EXAMINATION: CT OF THE HEAD WITHOUT CONTRAST  10/27/2021 10:52 pm TECHNIQUE: CT of the head was performed without the administration of intravenous contrast. Dose modulation, iterative reconstruction, and/or weight based adjustment of the mA/kV was utilized to reduce the radiation dose to as low as reasonably achievable. COMPARISON: None. HISTORY: ORDERING SYSTEM PROVIDED HISTORY: AMS, weakness, HA TECHNOLOGIST PROVIDED HISTORY: Reason for exam:->AMS, weakness, HA Reason for exam:->LNW 10/26/21 in the AM Has a \"code stroke\" or \"stroke alert\" been called? ->No Decision Support Exception - unselect if not a suspected or confirmed emergency medical condition->Emergency Medical Condition (MA) FINDINGS: BRAIN/VENTRICLES: There is moderate cerebral atrophy. There is moderate chronic ischemic change in the white matter. Ventricular size is appropriate for brain volume. There is no intracranial hemorrhage, edema, mass effect or midline shift. . ORBITS: The visualized portion of the orbits demonstrate no acute abnormality. SINUSES: The visualized paranasal sinuses and mastoid air cells demonstrate no acute abnormality. SOFT TISSUES/SKULL:  No acute abnormality of the visualized skull or soft tissues. No acute intracranial abnormality. CT ABDOMEN PELVIS W IV CONTRAST Additional Contrast? None    Result Date: 10/27/2021  EXAMINATION: CT OF THE ABDOMEN AND PELVIS WITH CONTRAST 10/27/2021 9:52 pm TECHNIQUE: CT of the abdomen and pelvis was performed with the administration of intravenous contrast. Multiplanar reformatted images are provided for review. Dose modulation, iterative reconstruction, and/or weight based adjustment of the mA/kV was utilized to reduce the radiation dose to as low as reasonably achievable. COMPARISON: None. HISTORY: ORDERING SYSTEM PROVIDED HISTORY: pain TECHNOLOGIST PROVIDED HISTORY: Reason for exam:->pain Additional Contrast?->None Decision Support Exception - unselect if not a suspected or confirmed emergency medical condition->Emergency Medical Condition (MA) FINDINGS: Lower Chest: Lung bases are free of infiltrates and effusions. Multiple pulmonary nodules in the lung bases.   There is a pleural based spiculated nodular lesion in the right lung base medially measuring approximately 1.4 cm in diameter suspicious for primary or metastatic malignancy. Further evaluation with prompt complete CT of the chest recommended. Organs: There are multiple heterogeneous hypodense lesions in the liver, the largest with a lobulated contour in the right lobe measuring approximately 6 cm in diameter and consistent with metastatic disease. There are tiny nonspecific splenic hypodensities, too small to characterize. Metastatic involvement cannot be excluded. Adrenals, kidneys are within normal limits. Poorly defined hypodense lesion in the body and tail of the pancreas measuring approximately 2.8 cm in diameter highly suspicious for malignancy. There is dilatation of the pancreatic duct more distally in the tail of the pancreas. Nonspecific pancreatic calcifications which may be vascular. Small gallstones in the dependent portion of the gallbladder. GI/Bowel: Small hiatal hernia. No bowel obstruction or free intraperitoneal air. Scattered colonic diverticula with no diverticulitis. Normal appendix. Pelvis: Distended urinary bladder with 2.3 cm diverticulum on the right. Status post hysterectomy. No free fluid in the pelvis. Peritoneum/Retroperitoneum: No abdominal aortic aneurysm. Nonspecific retroperitoneal nodes. Bones/Soft Tissues: Old left pubic rami fractures. No evidence of lytic or sclerotic bony lesions. Old left rib fractures. Poorly defined hypodense lesion in the body and tail of the pancreas measuring approximately 2.8 cm in diameter highly suspicious for malignancy. Dilatation of the pancreatic duct more distally in the tail of the pancreas. Multiple heterogeneous hypodense lesions in the liver consistent with metastatic disease. Tiny nonspecific splenic hypodensities, too small to characterize. Metastatic involvement cannot be excluded.  Multiple pulmonary nodules in the lung bases, the largest a spiculated nodular lesion in the right lung base medially measuring 1.4 cm in diameter suspicious for primary or metastatic malignancy. Further evaluation with prompt complete CT of the chest recommended. Cholelithiasis. Colonic diverticulosis with no evidence of diverticulitis. Distended urinary bladder with diverticula which may represent sequela from chronic outlet obstruction.      ASSESSMENT:  79 y.o. female with one week of malaise, nausea, and vomiting, found to have NSTEMI  Pancreatic body mass with diffuse hepatic metastasis    PLAN:  - CT liver biopsy as soon as IR is able  - stress test today  - CEA 23.6, Ca 19-9 and chromogranin A pending  [facility] consulted  - CT chest    Electronically signed by Da Andersen MD on 10/29/2021 at 1:22 PM

## 2021-10-29 NOTE — PROGRESS NOTES
MD   10 mL at 10/29/21 1150    sodium chloride flush 0.9 % injection 5-40 mL  5-40 mL IntraVENous PRN Mercedes Moon MD        0.9 % sodium chloride infusion  25 mL IntraVENous PRN Mercedes Moon MD        ondansetron (ZOFRAN-ODT) disintegrating tablet 4 mg  4 mg Oral Q8H PRN Mercedes Moon MD        Or    [facility] 4 mg  4 mg IntraVENous Q6H PRN Mercedes Moon MD        acetaminophen (TYLENOL) tablet 650 mg  650 mg Oral Q6H PRN Mercedes Moon MD   650 mg at 10/29/21 1149    Or    acetaminophen (TYLENOL) suppository 650 mg  650 mg Rectal Q6H PRN Mercedes Moon MD        polyethylene glycol [facility]) packet 17 g  17 g Oral Daily PRN Mercedes Moon MD        atorvastatin (LIPITOR) tablet 80 mg  80 mg Oral Nightly Mercedes Moon MD        metoprolol succinate (TOPROL XL) extended release tablet 25 mg  25 mg Oral BID [place], [place]   25 mg at 10/29/21 1144    aspirin chewable tablet 81 mg  81 mg Oral Daily [place], [place]   81 mg at 10/28/21 1440    perflutren lipid microspheres (DEFINITY) injection 1.65 mg  1.5 mL IntraVENous ONCE PRN [place], [place]          sodium chloride         Physical Exam:  /63   Pulse 50   Temp 99.3 °F (37.4 °C)   Resp 16   Ht 5' 2\" (1.575 m)   Wt 136 lb 1.6 oz (61.7 kg)   SpO2 93%   BMI 24.89 kg/m²   Wt Readings from Last 3 Encounters:   10/29/21 136 lb 1.6 oz (61.7 kg)   10/15/21 141 lb 9.6 oz (64.2 kg)   10/05/21 142 lb (64.4 kg)     Appearance: Awake, alert, no acute respiratory distress  Skin: Intact, no rash  Head: Normocephalic, atraumatic  Eyes: EOMI, no conjunctival erythema  ENMT: No pharyngeal erythema, MMM, no rhinorrhea  Neck: Supple, no elevated JVP, no carotid bruits  Lungs: Clear to auscultation bilaterally. No wheezes, rales, or rhonchi.   Cardiac: Regular rate and rhythm, +S1S2, no murmurs apparent  Abdomen: Soft, nontender, +bowel sounds  Extremities: Moves all extremities x 4, no lower extremity edema  Neurologic: No focal motor

## 2021-10-29 NOTE — PRE SEDATION
tablet Take 40 mg by mouth daily   Yes Historical Provider, MD   famotidine (PEPCID) 20 MG tablet Take 40 mg by mouth daily as needed (HEARTBURN)   Yes Historical Provider, MD   levothyroxine (SYNTHROID) 25 MCG tablet Take 1 tablet by mouth Daily 10/15/21  Yes Clemente Campoverde MD   atorvastatin (LIPITOR) 20 MG tablet Take 1 tablet by mouth nightly 10/15/21  Yes Clemente Campoverde MD   apixaban (ELIQUIS) 5 MG TABS tablet Take 1 tablet by mouth 2 times daily 10/15/21 11/14/21 Yes Clemente Campoverde MD   apixaban starter pack (ELIQUIS DVT/PE STARTER PACK) 5 MG TBPK tablet Take 1 tablet by mouth See Admin Instructions 10/5/21  Yes Antione Dawson,      Coumadin Use Last 7 Days:  no  Antiplatelet drug therapy use last 7 days: no  Other anticoagulant use last 7 days: yes - Heparin gtt (stopped for 6 hrs), eliquis (held 24 hrs)       Pre-Sedation Documentation and Exam:   I have reviewed the patient's history and review of systems.     Mallampati Airway Assessment:  Mallampati Class I - (soft palate, fauces, uvula & anterior/posterior tonsillar pillars are visible)    Prior History of Anesthesia Complications:   none    ASA Classification:  Class 2 - A normal healthy patient with mild systemic disease    Sedation/ Anesthesia Plan:   intravenous sedation    Medications Planned:   midazolam (Versed)  intravenously and fentanyl intravenously    Patient is an appropriate candidate for plan of sedation: yes    Electronically signed by Radha Demarco MD on 10/29/2021 at 1:43 PM

## 2021-10-29 NOTE — CARE COORDINATION
Care coordination: Met with patient and daughter bedside to discuss care transition/ discharge planning. Patient admitted 10/28 with malaise; nausea; and rib pain. [facility] and Cardiology were consulted. Troponins elevated- stress was done today. Patient is also for an IR guided liver biopsy today. Daughter states patient is independent with her ADL's. PCP is Dr. No Andino. No hx HHC. Plan remains back home pending clinical course. Will follow along with  and assist with discharge planning as necessary.        Cahd Mccullough RN   Case Management

## 2021-10-29 NOTE — PROCEDURES
[facility] Nuclear Stress Test:    Cardiologist: Dr. Sharif Colunga EKG: Sinus bradycardia with 1st degree AV block, normal EKG. Indications for study: Chest pain    1. No chest pain  2. No new arrhythmias  3. No EKG changes suggestive of stress induced ischemia  4. Nuclear images pending    Navid Garber MD., [facility].    [facility]) Cardiology

## 2021-10-29 NOTE — PROGRESS NOTES
Nurse to nurse report received from IR. Patient tolerated procedure. Hold anticoagulant's till tomorrow.     Electronically signed by Jona Lan RN on 10/29/2021 at 2:31 PM

## 2021-10-29 NOTE — PROGRESS NOTES
Subjective: The patient is awake and alert. No problems overnight. Denies chest pain, angina, and dyspnea. Denies abdominal pain. Tolerating diet. No nausea or vomiting. she is scheduled for a stress test today. Objective:    BP (!) 199/55   Pulse 56   Temp 98.1 °F (36.7 °C) (Oral)   Resp 18   Ht 5' 2\" (1.575 m)   Wt 136 lb 1.6 oz (61.7 kg)   SpO2 93%   BMI 24.89 kg/m²     Heart:  RRR, no murmurs, gallops, or rubs.   Lungs:  CTA bilaterally, no wheeze, rales or rhonchi  Abd: bowel sounds present, nontender, nondistended, no masses  Extrem:  No clubbing, cyanosis, or edema    CBC with Differential:    Lab Results   Component Value Date    WBC 8.3 10/29/2021    RBC 4.39 10/29/2021    HGB 11.6 10/29/2021    HCT 36.2 10/29/2021     10/29/2021    MCV 82.5 10/29/2021    MCH 26.4 10/29/2021    MCHC 32.0 10/29/2021    RDW 15.8 10/29/2021    SEGSPCT 59 01/16/2011    LYMPHOPCT 11.2 10/27/2021    MONOPCT 8.6 10/27/2021    BASOPCT 0.6 10/27/2021    MONOSABS 0.90 10/27/2021    LYMPHSABS 1.17 10/27/2021    EOSABS 0.09 10/27/2021    BASOSABS 0.06 10/27/2021     CMP:    Lab Results   Component Value Date     10/27/2021    K 3.4 10/27/2021    CL 95 10/27/2021    CO2 24 10/27/2021    BUN 15 10/27/2021    CREATININE 1.1 10/27/2021    GFRAA 57 10/27/2021    LABGLOM 47 10/27/2021    GLUCOSE 125 10/27/2021    GLUCOSE 115 08/03/2011    PROT 6.7 10/28/2021    LABALBU 3.6 10/28/2021    LABALBU 4.5 08/03/2011    CALCIUM 9.0 10/27/2021    BILITOT 1.1 10/28/2021    ALKPHOS 138 10/28/2021    AST 37 10/28/2021    ALT 19 10/28/2021        Assessment:    Patient Active Problem List   Diagnosis    Hyperlipidemia    Anxiety    Reactive depression    Hypothyroid    Grief reaction    NSTEMI (non-ST elevated myocardial infarction) ([facility] [address]    Pancreatic mass    Liver masses       Plan:  Stress test today  At some point liver biopsy by interventional radiology  Discussed with cardiology today        Salo Lundberg MD  9:19 AM  10/29/2021

## 2021-10-29 NOTE — PROGRESS NOTES
Spoke with NP with [facility] Cardiology-updated on plans for IR today/liver biopsy, aware that Heparin is on hold, ok to continue holding unless procedure is not going to be done today, will resume gtt when patient returns from IR.      Electronically signed by Cristhian Cid RN on 10/29/2021 at 9:55 AM

## 2021-10-29 NOTE — CONSULTS
Department of Medicine  Division of Hematology/Oncology  Attending Consult Note      Reason for Consult:  Pancreatic mass and liver metastasis  Requesting Physician:  Dr. Hawthorne Res:  Generalized weakness    History Obtained From:  Patient, daughter and EMR    HISTORY OF PRESENT ILLNESS:      The patient is a 79 y.o. female with significant past medical history of R Breast ca in 1987 s/p mastectomy and adjuvant chemotherapy. Also recent history of unprovoked LLE DVT and is now on anticoagulation with Eliquis. She now presented with generalized maalise and vague symtpoms of weakness, abdominal discomfort, nausea and emesis. Found to have NSTEMI. CT abd/pelvis imaging revealed Poorly defined hypodense lesion in the body and tail of the pancreas measuring approximately 2.8 cm in diameter highly suspicious for malignancy. Dilatation of the pancreatic duct more distally in the tail of the pancreas. Multiple heterogeneous hypodense lesions in the liver consistent with metastatic disease. Tiny nonspecific splenic hypodensities, too small to characterize. Metastatic involvement cannot be excluded. Multiple pulmonary nodules in the lung bases, the largest a spiculated nodular lesion in the right lung base medially measuring 1.4 cm in diameter suspicious for primary or metastatic malignancy. Oncology was subsequently consulted for further recommendations. She has some headache this morning. She is for IR biopsy now and is currently in radiology dept. Denies any GI or  blood loss or any easy bruising. Denies any  blurry vision, dizziness, lightheadedness. Denies any chest pain, palpitations, shortness of breath, dyspnea with exertion. Denies any diarrhea or constipation. Denies any lymph node swellings, lumps, bumps, fevers, chills, night sweats or unintentional weight loss.     Past Medical History:        Diagnosis Date    Anxiety     Back skin lesion     Boil     reomved from right shoulder    Cancer ([facility] [address]     RIGHT BREAST    Depression     STABLE ON MEDS    Hyperlipidemia     Hypothyroidism        Past Surgical History:        Procedure Laterality Date    BREAST SURGERY Right 1987    MASTECTOMY    BREAST SURGERY Left 1989    LEFT BREAST REDUCTION    HYSTERECTOMY  1989    OTHER SURGICAL HISTORY  5/10/2013    excision of cyst upper back        Current Medications:    Current Facility-Administered Medications: lidocaine 2 % injection 15 mL, 15 mL, IntraDERmal, Once  sodium chloride flush 0.9 % injection 5-40 mL, 5-40 mL, IntraVENous, PRN  heparin (porcine) injection 3,810 Units, 60 Units/kg, IntraVENous, PRN  heparin (porcine) injection 1,910 Units, 30 Units/kg, IntraVENous, PRN  heparin 25,000 units in dextrose 5% 250 mL (premix) infusion, 12 Units/kg/hr, IntraVENous, Continuous  ALPRAZolam (XANAX) tablet 0.25 mg, 0.25 mg, Oral, Daily PRN  aspirin chewable tablet 81 mg, 81 mg, Oral, Nightly  citalopram (CELEXA) tablet 40 mg, 40 mg, Oral, Daily  famotidine (PEPCID) tablet 40 mg, 40 mg, Oral, QPM  levothyroxine (SYNTHROID) tablet 25 mcg, 25 mcg, Oral, Daily  sodium chloride flush 0.9 % injection 5-40 mL, 5-40 mL, IntraVENous, 2 times per day  sodium chloride flush 0.9 % injection 5-40 mL, 5-40 mL, IntraVENous, PRN  0.9 % sodium chloride infusion, 25 mL, IntraVENous, PRN  ondansetron (ZOFRAN-ODT) disintegrating tablet 4 mg, 4 mg, Oral, Q8H PRN **OR** ondansetron (ZOFRAN) injection 4 mg, 4 mg, IntraVENous, Q6H PRN  acetaminophen (TYLENOL) tablet 650 mg, 650 mg, Oral, Q6H PRN **OR** acetaminophen (TYLENOL) suppository 650 mg, 650 mg, Rectal, Q6H PRN  polyethylene glycol (GLYCOLAX) packet 17 g, 17 g, Oral, Daily PRN  atorvastatin (LIPITOR) tablet 80 mg, 80 mg, Oral, Nightly  metoprolol succinate (TOPROL XL) extended release tablet 25 mg, 25 mg, Oral, BID  aspirin chewable tablet 81 mg, 81 mg, Oral, Daily  perflutren lipid microspheres (DEFINITY) injection 1.65 mg, 1.5 mL, IntraVENous, ONCE PRN    Allergies:  Duricef [cefadroxil]    Social History:   Social History     Socioeconomic History    Marital status:      Spouse name: Not on file    Number of children: Not on file    Years of education: Not on file    Highest education level: Not on file   Occupational History    Not on file   Tobacco Use    Smoking status: Former Smoker     Packs/day: 1.00     Years: 25.00     Pack years: 25.00     Quit date: 1990     Years since quittin.8    Smokeless tobacco: Never Used   Vaping Use    Vaping Use: Never used   Substance and Sexual Activity    Alcohol use: No    Drug use: No    Sexual activity: Not on file   Other Topics Concern    Not on file   Social History Narrative    Not on file     Social Determinants of Health     Financial Resource Strain: Low Risk     Difficulty of Paying Living Expenses: Not hard at all   Food Insecurity: No Food Insecurity    Worried About [address] [employer] in the Last Year: Never true    [facility] in the Last Year: Never true   Transportation Needs: No Transportation Needs    Lack of Transportation (Medical): No    Lack of Transportation (Non-Medical):  No   Physical Activity:     Days of Exercise per Week:     Minutes of Exercise per Session:    Stress:     Feeling of Stress :    Social Connections:     Frequency of Communication with Friends and Family:     Frequency of Social Gatherings with Friends and Family:     Attends Christianity Services:     Active Member of Clubs or Organizations:     Attends Club or Organization Meetings:     Marital Status:    Intimate Partner Violence:     Fear of Current or Ex-Partner:     Emotionally Abused:     Physically Abused:     Sexually Abused:        Family History:         Problem Relation Age of Onset    Heart Disease Mother     Mental Illness Mother     Heart Disease Father     Heart Surgery Father         stents placed    Diabetes Father 80       REVIEW OF SYSTEMS:    As per HPI Accession       [id number]   Referring         Tamia Ion  Number                       Physician   Date of Birth   1940   Sonographer       Kalee Alexandra Dumont RCS   Age             79 year(s)   Interpreting      [address] [place] [address]                               Physician         Physician Cardiology                                                 Joaquim Welch MD                                Any Other  Procedure Type of Study   TTE procedure:Echo Complete W/Doppler & Color Flow. Procedure Date Date: 10/28/2021 Start: 10:35 AM Study Location: ER Technical Quality: Adequate visualization Indications:NSTEMI. Patient Status: Routine Height: 62 inches Weight: 140 pounds BSA: 1.64 m^2 BMI: 25.61 kg/m^2 HR: 70 bpm BP: 116/64 mmHg  Findings   Left Ventricle  Normal left ventricle size and systolic function. Ejection fraction is visually estimated at 60-65%. No regional wall motion abnormalities seen. Moderate concentric left ventricular hypertrophy. Indeterminate diastolic function. Right Ventricle  Normal right ventricular size and function. TAPSE 18 mm. Left Atrium  Left atrium is of normal size. Right Atrium  Normal right atrium size. Mitral Valve  Normal mitral valve structure and function. No evidence of mitral valve stenosis. Moderate to severe mitral regurgitation with posteriorly directed jet. Tricuspid Valve  The tricuspid valve appears structurally normal.  Physiologic and/or trace tricuspid regurgitation. Unable to estimate PA systolic pressure. Aortic Valve  Structurally normal aortic valve. The aortic valve is trileaflet. No hemodynamically significant aortic stenosis is present. No evidence of aortic valve regurgitation. Pulmonic Valve  The pulmonic valve was not well visualized. Physiologic and/or trace pulmonic regurgitation present. No evidence of pulmonic valve stenosis. Pericardial Effusion  No evidence for hemodynamically significant pericardial effusion.    Pleural Effusion  No evidence of pleural effusion. Aorta  Normal aortic root and ascending aorta. Miscellaneous  The inferior vena cava diameter is normal with normal respiratory  variation. Conclusions   Summary  Normal left ventricle size and systolic function. Ejection fraction is visually estimated at 60-65%. No regional wall motion abnormalities seen. Moderate concentric left ventricular hypertrophy. Indeterminate diastolic function. Normal right ventricular size and function. TAPSE 18 mm  Moderate to severe mitral regurgitation with posteriorly directed jet. No hemodynamically significant aortic stenosis is present. Unable to estimate PA systolic pressure. No evidence for hemodynamically significant pericardial effusion. Signature   ----------------------------------------------------------------  Electronically signed by Lorna Gr MD(Interpreting  physician) on 10/28/2021 05:50 PM  ----------------------------------------------------------------      XR RIBS RIGHT INCLUDE CHEST (MIN 3 VIEWS)  Result Date: 10/27/2021  EXAMINATION: XRAY VIEWS OF THE RIGHT RIBS WITH FRONTAL XRAY VIEW OF THE CHEST 10/27/2021 5:01 pm COMPARISON: May 2017 HISTORY: ORDERING SYSTEM PROVIDED HISTORY: pain and numbness on right ribs TECHNOLOGIST PROVIDED HISTORY: Reason for exam:->pain and numbness on right ribs FINDINGS: Cardiac silhouette is not enlarged. There is a right breast prosthesis in place. No evidence of acute right rib cage fracture or deformity. No sclerotic or lytic bony lesion. No pneumothorax. Old fracture of the proximal right humerus. Small nodule in the left mid lung not clearly calcified and not characterized on this examination. This measures approximately 1.1 cm in diameter. Given the lack of prior radiographs for comparison further characterization with CT of the chest is recommended. No acute right rib cage pathology. 1.1 cm pulmonary nodule in the left mid lung.   Given the lack of prior radiographs for comparison recommend further characterization with follow-up nonemergent CT of the chest.     CT Head WO Contrast  Result Date: 10/27/2021  EXAMINATION: CT OF THE HEAD WITHOUT CONTRAST  10/27/2021 10:52 pm TECHNIQUE: CT of the head was performed without the administration of intravenous contrast. Dose modulation, iterative reconstruction, and/or weight based adjustment of the mA/kV was utilized to reduce the radiation dose to as low as reasonably achievable. COMPARISON: None. HISTORY: ORDERING SYSTEM PROVIDED HISTORY: AMS, weakness, HA TECHNOLOGIST PROVIDED HISTORY: Reason for exam:->AMS, weakness, HA Reason for exam:->LNW 10/26/21 in the AM Has a \"code stroke\" or \"stroke alert\" been called? ->No Decision Support Exception - unselect if not a suspected or confirmed emergency medical condition->Emergency Medical Condition (MA) FINDINGS: BRAIN/VENTRICLES: There is moderate cerebral atrophy. There is moderate chronic ischemic change in the white matter. Ventricular size is appropriate for brain volume. There is no intracranial hemorrhage, edema, mass effect or midline shift. . ORBITS: The visualized portion of the orbits demonstrate no acute abnormality. SINUSES: The visualized paranasal sinuses and mastoid air cells demonstrate no acute abnormality. SOFT TISSUES/SKULL:  No acute abnormality of the visualized skull or soft tissues. No acute intracranial abnormality. CT ABDOMEN PELVIS W IV CONTRAST Additional Contrast? None  Result Date: 10/27/2021  EXAMINATION: CT OF THE ABDOMEN AND PELVIS WITH CONTRAST 10/27/2021 9:52 pm TECHNIQUE: CT of the abdomen and pelvis was performed with the administration of intravenous contrast. Multiplanar reformatted images are provided for review. Dose modulation, iterative reconstruction, and/or weight based adjustment of the mA/kV was utilized to reduce the radiation dose to as low as reasonably achievable. COMPARISON: None.  HISTORY: ORDERING SYSTEM PROVIDED HISTORY: pain TECHNOLOGIST PROVIDED HISTORY: Reason for exam:->pain Additional Contrast?->None Decision Support Exception - unselect if not a suspected or confirmed emergency medical condition->Emergency Medical Condition (MA) FINDINGS: Lower Chest: Lung bases are free of infiltrates and effusions. Multiple pulmonary nodules in the lung bases. There is a pleural based spiculated nodular lesion in the right lung base medially measuring approximately 1.4 cm in diameter suspicious for primary or metastatic malignancy. Further evaluation with prompt complete CT of the chest recommended. Organs: There are multiple heterogeneous hypodense lesions in the liver, the largest with a lobulated contour in the right lobe measuring approximately 6 cm in diameter and consistent with metastatic disease. There are tiny nonspecific splenic hypodensities, too small to characterize. Metastatic involvement cannot be excluded. Adrenals, kidneys are within normal limits. Poorly defined hypodense lesion in the body and tail of the pancreas measuring approximately 2.8 cm in diameter highly suspicious for malignancy. There is dilatation of the pancreatic duct more distally in the tail of the pancreas. Nonspecific pancreatic calcifications which may be vascular. Small gallstones in the dependent portion of the gallbladder. GI/Bowel: Small hiatal hernia. No bowel obstruction or free intraperitoneal air. Scattered colonic diverticula with no diverticulitis. Normal appendix. Pelvis: Distended urinary bladder with 2.3 cm diverticulum on the right. Status post hysterectomy. No free fluid in the pelvis. Peritoneum/Retroperitoneum: No abdominal aortic aneurysm. Nonspecific retroperitoneal nodes. Bones/Soft Tissues: Old left pubic rami fractures. No evidence of lytic or sclerotic bony lesions. Old left rib fractures.    Poorly defined hypodense lesion in the body and tail of the pancreas measuring approximately 2.8 cm in diameter highly suspicious for malignancy. Dilatation of the pancreatic duct more distally in the tail of the pancreas. Multiple heterogeneous hypodense lesions in the liver consistent with metastatic disease. Tiny nonspecific splenic hypodensities, too small to characterize. Metastatic involvement cannot be excluded. Multiple pulmonary nodules in the lung bases, the largest a spiculated nodular lesion in the right lung base medially measuring 1.4 cm in diameter suspicious for primary or metastatic malignancy. Further evaluation with prompt complete CT of the chest recommended. Cholelithiasis. Colonic diverticulosis with no evidence of diverticulitis. Distended urinary bladder with diverticula which may represent sequela from chronic outlet obstruction. US DUP LOWER EXTREMITY LEFT DASHAWN  Result Date: 10/5/2021  EXAMINATION: DUPLEX VENOUS ULTRASOUND OF THE LEFT LOWER EXTREMITY 10/5/2021 3:38 pm TECHNIQUE: Duplex ultrasound using B-mode/gray scaled imaging and Doppler spectral analysis and color flow was obtained of the deep venous structures of the left lower extremity. COMPARISON: None. HISTORY: ORDERING SYSTEM PROVIDED HISTORY: Leg swelling FINDINGS: There is occlusive thrombus in the greater saphenous vein extending into the common femoral venous junction. The remaining visualized veins of the left lower extremity are patent and free of echogenic thrombus. The veins demonstrate good compressibility with normal color flow study and spectral analysis. Occlusive thrombus in the greater saphenous vein extending to the common femoral venous junction. Critical results were called by Dr. Gabriella Matthews to Dr. Ruma López On 10/5/2021 at 16:20.     US EXTREMITY LEFT NON VASC LIMITED  Result Date: 10/5/2021  EXAMINATION: NONVASCULAR ULTRASOUND OF THE LEFT EXTREMITY 10/5/2021 3:38 pm COMPARISON: None.  HISTORY: ORDERING SYSTEM PROVIDED HISTORY: Lump of skin of left lower extremity FINDINGS: Evaluation of the palpable lump in the medial aspect of the calf demonstrates occlusive thrombus in the greater saphenous vein. Thrombosed greater saphenous vein, corresponding to the palpable lump in the medial calf. Critical results were called by Dr. Kecia Levi to Dr. Amie Mckee On 10/5/2021 at 16:22. NM Cardiac Stress Test Nuclear Imaging  Result Date: 10/29/2021  Indication:  NSTEMI Clinical History:   Patient has no history of coronary artery disease. IMAGING: Myocardial perfusion imaging was performed at rest 30-35 minutes following the intravenous injection of 10.9 mCi of (Tc-Sestamibi) followed by 10 ml of Normal Saline. At peak exercise, the patient was injected intravenously with 30. 6mCi of (Tc-Sestamibi) followed by 10 ml of Normal Saline. Gated post-stress tomographic imaging was performed 20-25 minutes after stress. FINDINGS: The overall quality of the study was good. Left ventricular cavity size was noted to be normal. Rotational analog analysis demonstrated no motion artifact. The gated SPECT stress imaging in the short, vertical long, and horizontal long axis demonstrated normal homogeneous tracer distribution throughout the myocardium during stress and rest images. Gated SPECT left ventricular ejection fraction was calculated to be 70%, with normal Myocardial wall motion. The myocardial perfusion imaging was normal. Overall left ventricular systolic function was normal, EF 70%. Low risk myocardial perfusion study. No recent study available for comparison. Alan Edmonds MD, [facility]       IMPRESSION:   79year old female with a poorly defined 2.8 cm pancreatic lesion in the body and tail with evidence of multiple lesions in the liver and lung consistent with metastatic disease. Recent LLE DVT, on Eliquis   Hx of R breast ca s/p mastectomy in 1987 and adjuvant chemotherapy.   Hx of tobacco abuse  Abdominal pain secondary to neoplasia   NSTEMI      RECOMMENDATIONS:  She has extensive metastatis with disease likely originaging in pancreas with metastasis to liver and likely lung as well. IR guided liver biopsy to confirm diagnosis  Checking tumor markers. Check CT chest with IV contrast and a bone scan to evaluate the pulmonary lesions and evaluate for osseous metastasis. Thanks for the consultation and allowing me to participate in the care of your patient. I have asked him to follow-up with me in the clinic in 1-2 weeks to review pathology and further testing.     Electronically signed by Mireille Truong MD on 10/29/2021 at 1:59 PM

## 2021-10-29 NOTE — PROGRESS NOTES
IRFLOWSHEET    Date: 10/29/2021    Time: 1:27 PM     Exam: Image guided liver biopsy with possible conscious sedation    Radiologist Performing Procedure: Dr. Larry Meyer    Nurse Reporting/Phone: [id number]     Nurse Receiving: Zahra Pablo RN    Permit signed:      Yes    ID Band Checklist: Yes    Allergies: Duricef [cefadroxil]     TIME OUT: 3591    PROCEDURE START TIME: 4402    Puncture Site: right upper quadrant    Puncture Time: 0680    Catheters: 18g x 10cm [place]    LABS:   Lab Results   Component Value Date    INR 1.6 10/29/2021    PROTIME 17.3 (H) 10/29/2021           Lab Results   Component Value Date    CREATININE 1.1 (H) 10/27/2021    BUN 15 10/27/2021          Lab Results   Component Value Date    HGB 11.6 10/29/2021    HCT 36.2 10/29/2021     (L) 10/29/2021         PROCEDURE END TIME: 1415    Total Contrast::50cc Optiray 320    Fluoroscopy Time: na    Complications:  None    Comments: Patient brought into CT room and procedure explained by Dr. Larry Meyer. Procedural consent obtained. Patient placed supine on CT table with O2 and patient placed on monitor. Using CT, needle inserted and core biopsy obtained x 5 from right upper quadrant by Dr. Larry Meyer. Patient re-scanned and imaging reviewed by Dr. Larry Meyer. Biopsy site cleaned and dressing applied. Patient taken back to room and reported called to floor RN.

## 2021-10-30 NOTE — PROGRESS NOTES
S: Patient with some RUQ pain, not relieved with tylenol    O:  Temp 98.7 P-58 R-16 /62  Gen- alert, talking, no distress, daughter in room  HEENT-mmm  Lungs- clear  Cardiac- RRR  Abd-soft, mild RUQ tenderness +BS  Ext- warm and no edema    Labs:  Wbc=7 hb=11 btx=408    CT chest- 10-30-21    Heterogeneous somewhat necrotic lesion in the tail of the pancreas concerning   for pancreatic malignancy.  There may be superimposed pancreatitis.  Please   correlate with pancreatic enzymes.       Extensive bilateral pulmonary nodules concerning for diffuse pulmonary   metastasis.       Extensive metastatic involvement in the liver and lymphadenopathy in the   upper abdomen.           A/P:   79year old female new to Dr. Marsha Ragsdale with a poorly defined 2.8 cm pancreatic lesion in the body and tail with evidence of multiple lesions in the liver and lung consistent with metastatic disease. Recent LLE DVT, on Eliquis . She is s/p IR guided liver biopsy with pathology pending. 1. bone scan to evaluate  for osseous metastasis. 2. F/u with Dr. Marsha Ragsdale in 1 week to review pathology results  3.  Since tylenol not relieving abodminal pain, try vicoden 5/325 tab po q 6 hrs prn pain    Media MD Sarah

## 2021-10-30 NOTE — PROGRESS NOTES
Subjective: The patient is awake and alert. Confused overnight. Denies chest pain, angina, and dyspnea. Denies abdominal pain. Tolerating diet. No nausea or vomiting. Objective:    BP (!) 134/53   Pulse 53   Temp 99.9 °F (37.7 °C) (Oral)   Resp 16   Ht 5' 2\" (1.575 m)   Wt 136 lb 1.6 oz (61.7 kg)   SpO2 92%   BMI 24.89 kg/m²     Heart:  RRR, no murmurs, gallops, or rubs.   Lungs:  CTA bilaterally, no wheeze, rales or rhonchi  Abd: bowel sounds present, nontender, nondistended, no masses  Extrem:  No clubbing, cyanosis, or edema    CBC with Differential:    Lab Results   Component Value Date    WBC 8.3 10/29/2021    RBC 4.39 10/29/2021    HGB 11.6 10/29/2021    HCT 36.2 10/29/2021     10/29/2021    MCV 82.5 10/29/2021    MCH 26.4 10/29/2021    MCHC 32.0 10/29/2021    RDW 15.8 10/29/2021    SEGSPCT 59 01/16/2011    LYMPHOPCT 11.2 10/27/2021    MONOPCT 8.6 10/27/2021    BASOPCT 0.6 10/27/2021    MONOSABS 0.90 10/27/2021    LYMPHSABS 1.17 10/27/2021    EOSABS 0.09 10/27/2021    BASOSABS 0.06 10/27/2021     CMP:    Lab Results   Component Value Date     10/27/2021    K 3.4 10/27/2021    CL 95 10/27/2021    CO2 24 10/27/2021    BUN 15 10/27/2021    CREATININE 1.1 10/27/2021    GFRAA 57 10/27/2021    LABGLOM 47 10/27/2021    GLUCOSE 125 10/27/2021    GLUCOSE 115 08/03/2011    PROT 6.7 10/28/2021    LABALBU 3.6 10/28/2021    LABALBU 4.5 08/03/2011    CALCIUM 9.0 10/27/2021    BILITOT 1.1 10/28/2021    ALKPHOS 138 10/28/2021    AST 37 10/28/2021    ALT 19 10/28/2021        Assessment:    Patient Active Problem List   Diagnosis    Hyperlipidemia    Anxiety    Reactive depression    Hypothyroid    Grief reaction    NSTEMI (non-ST elevated myocardial infarction) ([facility] [address]    Pancreatic mass    Liver masses       Plan:  restart Eliquis  Probable D/C 10/31/2021        Kennedy Mann MD  5:45 AM  10/30/2021

## 2021-10-30 NOTE — PROGRESS NOTES
Chief Complaint   Patient presents with    Numbness     right rib     Emesis     friday night    Nausea    Fatigue      I have examined the patient, performed key aspects of physical exam, and reviewed the record (including all pertinent and new radiology images and laboratory findings). GENERAL SURGERY PROGRESS NOTE         Subjective    Status post IR biopsy yesterday some mild discomfort at puncture site. Scheduled medications:   apixaban  5 mg Oral BID    atorvastatin  40 mg Oral Nightly    aspirin  81 mg Oral Nightly    citalopram  40 mg Oral Daily    famotidine  40 mg Oral QPM    levothyroxine  25 mcg Oral Daily    sodium chloride flush  5-40 mL IntraVENous 2 times per day    metoprolol succinate  25 mg Oral BID    aspirin  81 mg Oral Daily       PRN medications:   sodium chloride flush, ioversol, ALPRAZolam, sodium chloride flush, sodium chloride, ondansetron **OR** ondansetron, acetaminophen **OR** acetaminophen, polyethylene glycol, perflutren lipid microspheres      Objective    Vitals:    10/29/21 1530 10/29/21 1945 10/30/21 0034 10/30/21 0559   BP: 128/63 121/80 (!) 134/53    Pulse: 50 60 53    Resp: 16 18 16    Temp: 99.3 °F (37.4 °C) 99 °F (37.2 °C) 99.9 °F (37.7 °C)    TempSrc:  Oral Oral    SpO2: 93%  92%    Weight:    141 lb 3.2 oz (64 kg)   Height:           No intake/output data recorded. Gen: NAD  Resp: Breathing Comfortably  CV: Reg Rate  Abd: Soft, ND, No R/G       Labs:    CBC  Recent Labs     10/29/21  0136   WBC 8.3   HGB 11.6   HCT 36.2   *     BMP  Recent Labs     10/27/21  2155      K 3.4*   CL 95*   CO2 24   BUN 15   CREATININE 1.1*   CALCIUM 9.0     Liver Function  Recent Labs     10/28/21  0051   LIPASE 29   BILITOT 1.1   BILIDIR 0.4*   AST 37*   ALT 19   ALKPHOS 138*   PROT 6.7   LABALBU 3.6     No results for input(s): LACTATE in the last 72 hours.   Recent Labs     10/29/21  0136   INR 1.6       Imaging:    Echo Complete    Result Date: 10/28/2021  Transthoracic Echocardiography Report (TTE)  Demographics   Patient Name    [facility]     Gender            Female                  [facility]   Medical Record  [id number]     Room Number       1348  Number   Account #       [de-identified]    Procedure Date    10/28/2021   Corporate ID                 Ordering          Shama Chiu MD                               Physician   Accession       [id number]   Referring         Meliza Leong  Number                       Physician   Date of Birth   1940   Sonographer       Kalee Berman [facility]   Age             79 year(s)   Interpreting      [address] [place] [address]                               Physician         Physician Cardiology                                                 Shama Chiu MD                                Any Other  Procedure Type of Study   TTE procedure:Echo Complete W/Doppler & Color Flow. Procedure Date Date: 10/28/2021 Start: 10:35 AM Study Location: ER Technical Quality: Adequate visualization Indications:NSTEMI. Patient Status: Routine Height: 62 inches Weight: 140 pounds BSA: 1.64 m^2 BMI: 25.61 kg/m^2 HR: 70 bpm BP: 116/64 mmHg  Findings   Left Ventricle  Normal left ventricle size and systolic function. Ejection fraction is visually estimated at 60-65%. No regional wall motion abnormalities seen. Moderate concentric left ventricular hypertrophy. Indeterminate diastolic function. Right Ventricle  Normal right ventricular size and function. TAPSE 18 mm. Left Atrium  Left atrium is of normal size. Right Atrium  Normal right atrium size. Mitral Valve  Normal mitral valve structure and function. No evidence of mitral valve stenosis. Moderate to severe mitral regurgitation with posteriorly directed jet. Tricuspid Valve  The tricuspid valve appears structurally normal.  Physiologic and/or trace tricuspid regurgitation. Unable to estimate PA systolic pressure. Aortic Valve  Structurally normal aortic valve.   The aortic valve is trileaflet. No hemodynamically significant aortic stenosis is present. No evidence of aortic valve regurgitation. Pulmonic Valve  The pulmonic valve was not well visualized. Physiologic and/or trace pulmonic regurgitation present. No evidence of pulmonic valve stenosis. Pericardial Effusion  No evidence for hemodynamically significant pericardial effusion. Pleural Effusion  No evidence of pleural effusion. Aorta  Normal aortic root and ascending aorta. Miscellaneous  The inferior vena cava diameter is normal with normal respiratory  variation. Conclusions   Summary  Normal left ventricle size and systolic function. Ejection fraction is visually estimated at 60-65%. No regional wall motion abnormalities seen. Moderate concentric left ventricular hypertrophy. Indeterminate diastolic function. Normal right ventricular size and function. TAPSE 18 mm  Moderate to severe mitral regurgitation with posteriorly directed jet. No hemodynamically significant aortic stenosis is present. Unable to estimate PA systolic pressure. No evidence for hemodynamically significant pericardial effusion.    Signature   ----------------------------------------------------------------  Electronically signed by Baldev Ponce MD(Interpreting  physician) on 10/28/2021 05:50 PM  ----------------------------------------------------------------  M-Mode/2D Measurements & Calculations   LV Diastolic    LV Systolic Dimension: 2.4   AV Cusp Separation: 1.6 cmLA  Dimension: 3.4  cm                           Dimension: 3.1 cmAO Root  cm              LV Volume Diastolic: 39.4 ml Dimension: 2.7 cm  LV FS:29.4 %    LV Volume Systolic: 03.6 ml  LV PW           LV EDV/LV EDV Index: 87.9  Diastolic: 1.4  IO/46 ZT/X^3SG ESV/LV ESV  cm              Index: 20.5 ml/12ml/ m^2     RV Diastolic Dimension: 2.7  LV PW Systolic: EF Calculated: 41.2 %        cm  1.5 cm          LV Mass Index: 113 l/min*m^2  Septum LA/Aorta: 6.11  Diastolic: 1.6                               Ascending Aorta: 3.1 cm  cm              LVOT: 2 cm                   LA volume/Index: 46 ml  Septum                                       /77AD/G^4  Systolic: 1.7                                RA Area: 13.8 cm^2  cm  CO: 4.7 l/min                                IVC Expiration: 0.9 cm  CI: 2.87  l/m*m^2  LV Mass: 186 g  Doppler Measurements & Calculations   MV Peak E-Wave: 0.7 m/s  AV Peak Velocity: 1.25 m/s   LVOT Peak Velocity:  MV Peak A-Wave: 1.13 m/s AV Peak Gradient: 6.23 mmHg  0.96 m/s  MV E/A Ratio: 0.61       AV Mean Velocity: 0.85 m/s   LVOT Mean Velocity:  MV Peak Gradient: 6.5    AV Mean Gradient: 3.4 mmHg   0.67 m/s  mmHg                     AV VTI: 25.5 cm              LVOT Peak Gradient:  MV Mean Gradient: 2.4    AV Area (Continuity):2.64    3.7 mmHgLVOT Mean  mmHg                     cm^2                         Gradient: 2 mmHg  MV Mean Velocity: 0.71  m/s                      LVOT VTI: 21.4 cm  MV Deceleration Time:    IVRT: 115.3 msec  261.9 msec  MV P1/2t: 79.6 msec      Pulm. Vein A Reversal  MVA by PHT:2.76 cm^2     Duration:110.7 msec          PV Peak Velocity:  MV Area (continuity):    Pulm. Vein D Velocity:0.24   0.91 m/s  2.2 cm^2                 m/sPulm. Vein A Reversal     PV Peak Gradient:  MV E' Septal Velocity:   Velocity:0.23 m/s            3.31 mmHg  0.04 m/s                 Pulm. Vein S Velocity: 0.56  PV Mean Velocity:  MV E' Lateral Velocity:  m/s                          0.66 m/s  6 m/s                                                 PV Mean Gradient: 2  MR Velocity: 6.46 m/s                                 mmHg  MV SHANA PISA: 0.15 cm^2  MR VTI: 211 cm  Alias Velocity: 0.24  m/sPISA Radius: 0.8 cm   PISA area: 4.02 cm^2MR  flow rate: 96.08 ml/sMR  volume:31.65 ml  [email or web address] [id number]    XR RIBS RIGHT INCLUDE CHEST (MIN 3 VIEWS)    Result Date: 10/27/2021  EXAMINATION: XRAY VIEWS OF THE RIGHT RIBS WITH FRONTAL XRAY VIEW OF THE CHEST 10/27/2021 5:01 pm COMPARISON: May 2017 HISTORY: ORDERING SYSTEM PROVIDED HISTORY: pain and numbness on right ribs TECHNOLOGIST PROVIDED HISTORY: Reason for exam:->pain and numbness on right ribs FINDINGS: Cardiac silhouette is not enlarged. There is a right breast prosthesis in place. No evidence of acute right rib cage fracture or deformity. No sclerotic or lytic bony lesion. No pneumothorax. Old fracture of the proximal right humerus. Small nodule in the left mid lung not clearly calcified and not characterized on this examination. This measures approximately 1.1 cm in diameter. Given the lack of prior radiographs for comparison further characterization with CT of the chest is recommended. No acute right rib cage pathology. 1.1 cm pulmonary nodule in the left mid lung. Given the lack of prior radiographs for comparison recommend further characterization with follow-up nonemergent CT of the chest.     CT Head WO Contrast    Result Date: 10/27/2021  EXAMINATION: CT OF THE HEAD WITHOUT CONTRAST  10/27/2021 10:52 pm TECHNIQUE: CT of the head was performed without the administration of intravenous contrast. Dose modulation, iterative reconstruction, and/or weight based adjustment of the mA/kV was utilized to reduce the radiation dose to as low as reasonably achievable. COMPARISON: None. HISTORY: ORDERING SYSTEM PROVIDED HISTORY: AMS, weakness, HA TECHNOLOGIST PROVIDED HISTORY: Reason for exam:->AMS, weakness, HA Reason for exam:->LNW 10/26/21 in the AM Has a \"code stroke\" or \"stroke alert\" been called? ->No Decision Support Exception - unselect if not a suspected or confirmed emergency medical condition->Emergency Medical Condition (MA) FINDINGS: BRAIN/VENTRICLES: There is moderate cerebral atrophy. There is moderate chronic ischemic change in the white matter.   Ventricular size is appropriate for brain volume. There is no intracranial hemorrhage, edema, mass effect or midline shift. . ORBITS: The visualized portion of the orbits demonstrate no acute abnormality. SINUSES: The visualized paranasal sinuses and mastoid air cells demonstrate no acute abnormality. SOFT TISSUES/SKULL:  No acute abnormality of the visualized skull or soft tissues. No acute intracranial abnormality. CT ABDOMEN PELVIS W IV CONTRAST Additional Contrast? None    Result Date: 10/27/2021  EXAMINATION: CT OF THE ABDOMEN AND PELVIS WITH CONTRAST 10/27/2021 9:52 pm TECHNIQUE: CT of the abdomen and pelvis was performed with the administration of intravenous contrast. Multiplanar reformatted images are provided for review. Dose modulation, iterative reconstruction, and/or weight based adjustment of the mA/kV was utilized to reduce the radiation dose to as low as reasonably achievable. COMPARISON: None. HISTORY: ORDERING SYSTEM PROVIDED HISTORY: pain TECHNOLOGIST PROVIDED HISTORY: Reason for exam:->pain Additional Contrast?->None Decision Support Exception - unselect if not a suspected or confirmed emergency medical condition->Emergency Medical Condition (MA) FINDINGS: Lower Chest: Lung bases are free of infiltrates and effusions. Multiple pulmonary nodules in the lung bases. There is a pleural based spiculated nodular lesion in the right lung base medially measuring approximately 1.4 cm in diameter suspicious for primary or metastatic malignancy. Further evaluation with prompt complete CT of the chest recommended. Organs: There are multiple heterogeneous hypodense lesions in the liver, the largest with a lobulated contour in the right lobe measuring approximately 6 cm in diameter and consistent with metastatic disease. There are tiny nonspecific splenic hypodensities, too small to characterize. Metastatic involvement cannot be excluded. Adrenals, kidneys are within normal limits.  Poorly defined hypodense lesion in the body and tail of the pancreas measuring approximately 2.8 cm in diameter highly suspicious for malignancy. There is dilatation of the pancreatic duct more distally in the tail of the pancreas. Nonspecific pancreatic calcifications which may be vascular. Small gallstones in the dependent portion of the gallbladder. GI/Bowel: Small hiatal hernia. No bowel obstruction or free intraperitoneal air. Scattered colonic diverticula with no diverticulitis. Normal appendix. Pelvis: Distended urinary bladder with 2.3 cm diverticulum on the right. Status post hysterectomy. No free fluid in the pelvis. Peritoneum/Retroperitoneum: No abdominal aortic aneurysm. Nonspecific retroperitoneal nodes. Bones/Soft Tissues: Old left pubic rami fractures. No evidence of lytic or sclerotic bony lesions. Old left rib fractures. Poorly defined hypodense lesion in the body and tail of the pancreas measuring approximately 2.8 cm in diameter highly suspicious for malignancy. Dilatation of the pancreatic duct more distally in the tail of the pancreas. Multiple heterogeneous hypodense lesions in the liver consistent with metastatic disease. Tiny nonspecific splenic hypodensities, too small to characterize. Metastatic involvement cannot be excluded. Multiple pulmonary nodules in the lung bases, the largest a spiculated nodular lesion in the right lung base medially measuring 1.4 cm in diameter suspicious for primary or metastatic malignancy. Further evaluation with prompt complete CT of the chest recommended. Cholelithiasis. Colonic diverticulosis with no evidence of diverticulitis. Distended urinary bladder with diverticula which may represent sequela from chronic outlet obstruction. Assessment/Plan  79 y.o. female admitted 10/27/2021 with concern for likely metastatic pancreatic cancer. Status post IR biopsy. Await results. Should be having CT chest today. CBC pending for today.   Once that is completed she can be discharged from surgical standpoint and follow-up in the office with Dr. Salima Telles. Discussed with daughter. Aneta Lyons MD  10/30/21  8:43 AM    NOTE: This report, in part or full, may have been transcribed using voice recognition software. Every effort was made to ensure accuracy; however, inadvertent computerized transcription errors may be present. Please excuse any transcriptional grammatical or spelling errors that may have escaped my editorial review.

## 2021-10-30 NOTE — PROGRESS NOTES
Pt found in the hallway, confused and agitated, stating that we are \"scamming\" her and that we are holding her against her will. Daughter Becca Sims notified and stated that she will come up to see the patient, which somewhat calmed the patient down and she returned to her room. Approval obtained by clinical manager Joe Pandey.

## 2021-10-31 NOTE — PLAN OF CARE
Problem: Pain:  Goal: Pain level will decrease  Description: Pain level will decrease  10/31/2021 1200 by Casi Rice  Outcome: Completed  10/31/2021 1022 by Casi Rice  Outcome: Ongoing  Goal: Control of acute pain  Description: Control of acute pain  10/31/2021 1200 by Casi Rice  Outcome: Completed  10/31/2021 1022 by Casi Rice  Outcome: Ongoing  Goal: Control of chronic pain  Description: Control of chronic pain  10/31/2021 1200 by Casi Rice  Outcome: Completed  10/31/2021 1022 by Casi Rice  Outcome: Ongoing     Problem: Falls - Risk of:  Goal: Will remain free from falls  Description: Will remain free from falls  10/31/2021 1200 by Casi Rice  Outcome: Completed  10/31/2021 1022 by Casi Rice  Outcome: Met This Shift  Goal: Absence of physical injury  Description: Absence of physical injury  10/31/2021 1200 by Casi Rice  Outcome: Completed  10/31/2021 1022 by Casi Rice  Outcome: Met This Shift

## 2021-11-01 NOTE — TELEPHONE ENCOUNTER
Spoke with patient's daughter, the appointment on 11-2-21 with Dr. Stu Godwin was cancelled, the patient was diagnosed with liver and pancreatic cancer. Will call to reschedule in the future.

## 2021-11-02 NOTE — PROGRESS NOTES
Post-Discharge Transitional Care Management Services or Hospital Follow Up      Sloane Mak   YOB: 1940    Date of Office Visit:  11/2/2021  Date of Hospital Admission: 10/27/21  Date of Hospital Discharge: 10/31/21  Readmission Risk Score(high >=14%. Medium >=10%):Readmission Risk Score: 8.9    Care management risk score Rising risk (score 2-5) and Complex Care (Scores >=6): 0     Non face to face  following discharge, date last encounter closed (first attempt may have been earlier): *No documented post hospital discharge outreach found in the last 14 days *No documented post hospital discharge outreach found in the last 14 days    Call initiated 2 business days of discharge: *No response recorded in the last 14 days     Patient Active Problem List   Diagnosis    Hyperlipidemia    Anxiety    Reactive depression    Hypothyroid    Grief reaction    NSTEMI (non-ST elevated myocardial infarction) ([facility] [address]    Pancreatic mass    Liver masses       Allergies   Allergen Reactions    Duricef [Cefadroxil] Hives       Medications listed as ordered at the time of discharge from [facility]   Malik Correa [id number] Medication Instructions BINU:    Printed on:11/02/21 1433   Medication Information                      ALPRAZolam (XANAX) 0.25 MG tablet  Take 0.25 mg by mouth daily as needed for Sleep or Anxiety. apixaban (ELIQUIS) 5 MG TABS tablet  Take 1 tablet by mouth 2 times daily             aspirin 81 MG chewable tablet  Take 1 tablet by mouth nightly             atorvastatin (LIPITOR) 20 MG tablet  Take 1 tablet by mouth nightly             citalopram (CELEXA) 40 MG tablet  Take 40 mg by mouth daily             famotidine (PEPCID) 20 MG tablet  Take 40 mg by mouth daily as needed (HEARTBURN)             Handicap Placard MISC  by Does not apply route             HYDROcodone-acetaminophen (NORCO) 5-325 MG per tablet  Take 1 tablet by mouth every 6 hours as needed for Pain for up to 7 days. levothyroxine (SYNTHROID) 25 MCG tablet  Take 1 tablet by mouth Daily             metoprolol succinate (TOPROL XL) 25 MG extended release tablet  Take 1 tablet by mouth 2 times daily             ondansetron (ZOFRAN-ODT) 4 MG disintegrating tablet  Take 1 tablet by mouth every 8 hours as needed for Nausea or Vomiting                   Medications marked \"taking\" at this time  Outpatient Medications Marked as Taking for the 11/2/21 encounter (Office Visit) with Nae Fowler PA-C   Medication Sig Dispense Refill    Handicap Placard MISC by Does not apply route 1 each 0    aspirin 81 MG chewable tablet Take 1 tablet by mouth nightly 30 tablet 3    metoprolol succinate (TOPROL XL) 25 MG extended release tablet Take 1 tablet by mouth 2 times daily 30 tablet 3    ALPRAZolam (XANAX) 0.25 MG tablet Take 0.25 mg by mouth daily as needed for Sleep or Anxiety.  citalopram (CELEXA) 40 MG tablet Take 40 mg by mouth daily      famotidine (PEPCID) 20 MG tablet Take 40 mg by mouth daily as needed (HEARTBURN)      levothyroxine (SYNTHROID) 25 MCG tablet Take 1 tablet by mouth Daily 90 tablet 1    atorvastatin (LIPITOR) 20 MG tablet Take 1 tablet by mouth nightly 90 tablet 1    apixaban (ELIQUIS) 5 MG TABS tablet Take 1 tablet by mouth 2 times daily 60 tablet 2        Medications patient taking as of now reconciled against medications ordered at time of hospital discharge: Yes    Chief Complaint   Patient presents with    Follow-Up from Hospital     Not feeling good, hurts everwhere, no appetite, all she wants to do is sleep     Inpatient course: Discharge summary reviewed- see chart. Interval history/Current status: Struggling with possible diagnosis. Doesn't understand how she had a stomach ache one day but is now dealing with this. Wants pathology results now and doesn't understand why they aren't in right now. Just wants to sleep all the time but is not getting restful sleep.  Has xanax at home that she Bowel sounds are normal. There is no distension. Palpations: Abdomen is soft. Tenderness: There is no abdominal tenderness. Musculoskeletal:      Cervical back: Neck supple. Skin:     General: Skin is warm and dry. Findings: No rash. Comments: Liver biopsy site is without redness, warmth, or tenderness   Neurological:      Mental Status: She is alert and oriented to person, place, and time. Psychiatric:         Thought Content: Thought content normal.      Comments: Laying on bed, appears sad      Assessment/Plan:  1. Pancreatic mass  2. Liver masses  - Handicap Placard MISC; by Does not apply route  Dispense: 1 each;  Refill: 0  - Jeff Reina MD, Hematology and Oncology, Dari Earl (AD[facility]ANA)  - DME Order for [facility]) as [address], MD, HPB & Pancreatic Surgery, Leanne  - Discussed pain control with tylenol and norco PRN  - Discussed the importance of eating and incorporating boost/ensure      Medical Decision Making: moderate complexity

## 2021-11-08 NOTE — TELEPHONE ENCOUNTER
Medication Refill Request    LOV 11/2/2021  NOV Visit date not found    Lab Results   Component Value Date    CREATININE 0.9 10/30/2021

## 2021-11-12 NOTE — TELEPHONE ENCOUNTER
Daughter Earl Ennis called in and she asked to cancel her mothers appt because they just left Dr. Kirsten Mo and he said this appt was not needed that we can just schedule patient for her port insertion. I made Dr. Clyde Arreola aware and I will work on getting her scheduled for her port.     Electronically signed by Peter Arce RN on 11/12/2021 at 11:29 AM

## 2021-11-15 NOTE — TELEPHONE ENCOUNTER
I called [facility] and no Reilly Goldberg was needed for cpt code 10826 with ref#[id number] from [place]. I scheduled patient for her port on 11/29/21 at 2:00pm at [facility]. I called Omar Alvarez and she was given this surgery date, time and location. I did tell her PAT will call closer to date with further instructions and I did tell her to hold her mothers Eliquis starting on 11/26/21. She verbalized understanding and confirmed this appt.     Electronically signed by Rhonda John RN on 11/15/2021 at 12:49 PM

## 2021-11-15 NOTE — TELEPHONE ENCOUNTER
From: Sussy Alexis  To: Dr. Hiram Barahona  Sent: 11/13/2021 10:47 AM EST  Subject: Patient script for handicap placard    My mothers handicap placard document from her 11/2 visit doesnt include the required notation of duration time. (See attached photo)    Can you redo this document and include time duration as 5 years (this is the max time they allow). Is it possible to email me the updated document to :  [email or web address]    I can print it and revisit the bmv.  Thank you,  Maricruz Cross

## 2021-11-21 PROBLEM — J96.01 ACUTE RESPIRATORY FAILURE WITH HYPOXIA (HCC): Status: ACTIVE | Noted: 2021-01-01

## 2021-11-21 NOTE — ED NOTES
Bed: 18  Expected date:   Expected time:   Means of arrival:   Comments:  EMS     Sanjuanita South RN  11/21/21 5187

## 2021-11-21 NOTE — ED PROVIDER NOTES
Chief Complaint   Patient presents with    Shortness of Breath     Pancreatic CA pt- having cough and fatigue    Cough    Fatigue       HPI   Mariola Farmer is a 79 y.o. old female with a history of pancreatic cancer presenting to the emergency department with a complaint of shortness of breath, cough, fatigue, fevers, and confusion. Patient is present with her daughter at bedside who provides additional history. 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 The patient lives at home with some assistance from her family. She states yesterday she became increasingly weak and was unable to walk on her own. Her daughter states she was more confused than baseline and has had a new productive cough immediately after her chemotherapy. Her symptoms are severe and have been getting worse. She denies any relieving or provoking factors. Review of Systems   Constitutional: Positive for activity change, fatigue and fever. Negative for chills. HENT: Negative for ear pain and sore throat. Eyes: Negative for pain. Respiratory: Positive for cough and shortness of breath. Cardiovascular: Negative for chest pain. Gastrointestinal: Negative for abdominal pain, diarrhea, nausea and vomiting. Genitourinary: Negative for flank pain. Musculoskeletal: Negative for back pain and neck pain. Skin: Negative for rash. Neurological: Positive for headaches. Psychiatric/Behavioral: Positive for confusion. Negative for behavioral problems. The patient is not nervous/anxious. Physical Exam  Constitutional:       General: She is in acute distress. Appearance: Normal appearance. She is ill-appearing. Interventions: She is not intubated. HENT:      Head: Normocephalic and atraumatic.       Right Ear: External ear normal.      Left Ear: External ear normal.      Nose: Nose normal.      Mouth/Throat:      Mouth: Neonatology Staff - Parent Communication  I met Ms. Driver in her post-partum room with nursing supervisor, Adia South this afternoon. We apologized for the incident earlier today on the NICU where she was erroneously escorted to the room/bed of another infant to visit and hold, instead of her own infant. We informed her that hospital administration was aware of the incident and that it was being investigated with respect to how it occurred, and to develop efforts to prevent such an occurrence from happening again in the future. Ms. Driver was accepting of our apology and discussion.  CRICKET BALDERRAMA MD  Attending Neonatologist   Mucous membranes are moist.      Pharynx: No oropharyngeal exudate. Eyes:      Extraocular Movements: Extraocular movements intact. Conjunctiva/sclera: Conjunctivae normal.      Pupils: Pupils are equal, round, and reactive to light. Cardiovascular:      Rate and Rhythm: Normal rate. Pulses: Normal pulses. Pulmonary:      Effort: Respiratory distress present. No accessory muscle usage. She is not intubated. Breath sounds: Decreased breath sounds, wheezing and rhonchi present. Abdominal:      Palpations: Abdomen is soft. Tenderness: There is no abdominal tenderness. There is no guarding or rebound. Musculoskeletal:         General: No deformity or signs of injury. Cervical back: Neck supple. No rigidity. Right lower leg: Edema present. Left lower leg: Edema present. Skin:     General: Skin is warm and dry. Capillary Refill: Capillary refill takes less than 2 seconds. Neurological:      General: No focal deficit present. Mental Status: She is alert and oriented to person, place, and time. Mental status is at baseline. Psychiatric:         Mood and Affect: Mood normal.         Behavior: Behavior normal.          Procedures     MDM   Patient is an 79-year-old female who presents to the emergency department with complaint of shortness of breath, generalized weakness, fevers, and confusion. Patient has a history of pancreatic cancer and was just started on chemotherapy. She appeared with labored breathing and a fever. She was 86% on room air so we placed her on O2 nasal cannula. We ordered a chest x-ray and CTA pulmonary along with blood work and a Covid test.  She was found to be Covid positive her imaging was significant for groundglass opacities most likely due to COVID-19. There are also multiple pulmonary nodules which she already knew about. Her blood work showed pancytopenia most likely due to her chemo therapy.   Due to the patient's neutropenic fever the patient was started on vancomycin and cefepime. The case was discussed with Dr. Kyra Taylor who agreed to admit for acute respiratory failure with hypoxia due to COVID-19. ED Course as of 11/21/21 1956   Sun Nov 21, 2021   1429 EKG: This EKG is signed and interpreted by me. Rate: 74  Rhythm: Sinus  Interpretation: Normal sinus rhythm, normal CO interval, normal QRS, QTc interval mildly prolonged at 479, no acute ST or T wave changes, nonspecific T wave abnormalities  Comparison: No significant change when compared to prior EKG     [JA]      ED Course User Index  [JA] Tiffanie Arellano MD       --------------------------------------------- PAST HISTORY ---------------------------------------------  Past Medical History:  has a past medical history of Anxiety, Back skin lesion, Boil, Cancer ([facility] [address] Depression, Hyperlipidemia, Hypothyroidism, and [facility] Past Surgical History:  has a past surgical history that includes Hysterectomy (1989); Breast surgery (Right, 1987); Breast surgery (Left, 1989); other surgical history (5/10/2013); and CT NEEDLE BIOPSY LIVER PERCUTANEOUS (10/29/2021). Social History:  reports that she quit smoking about 31 years ago. She has a 25.00 pack-year smoking history. She has never used smokeless tobacco. She reports that she does not drink alcohol and does not use drugs. Family History: family history includes Diabetes (age of onset: 80) in her father; Heart Disease in her father and mother; Heart Surgery in her father; Mental Illness in her mother. The patients home medications have been reviewed.     Allergies: Duricef [cefadroxil]    -------------------------------------------------- RESULTS -------------------------------------------------    LABS:  Results for orders placed or performed during the hospital encounter of 11/21/21   COVID-19, Rapid    Specimen: Nasopharyngeal Swab   Result Value Ref Range    SARS-CoV-2, NAAT DETECTED (A) Not Detected CBC Auto Differential   Result Value Ref Range    WBC 1.9 (L) 4.5 - 11.5 E9/L    RBC 3.75 3.50 - 5.50 E12/L    Hemoglobin 9.6 (L) 11.5 - 15.5 g/dL    Hematocrit 30.9 (L) 34.0 - 48.0 %    MCV 82.4 80.0 - 99.9 fL    MCH 25.6 (L) 26.0 - 35.0 pg    MCHC 31.1 (L) 32.0 - 34.5 %    RDW 16.8 (H) 11.5 - 15.0 fL    Platelets 50 (L) 015 - 450 E9/L    MPV 12.4 (H) 7.0 - 12.0 fL    Neutrophils % 83.2 (H) 43.0 - 80.0 %    Immature Granulocytes % 1.1 0.0 - 5.0 %    Lymphocytes % 12.1 (L) 20.0 - 42.0 %    Monocytes % 2.6 2.0 - 12.0 %    Eosinophils % 0.5 0.0 - 6.0 %    Basophils % 0.5 0.0 - 2.0 %    Neutrophils Absolute 1.58 (L) 1.80 - 7.30 E9/L    Immature Granulocytes # 0.02 E9/L    Lymphocytes Absolute 0.23 (L) 1.50 - 4.00 E9/L    Monocytes Absolute 0.05 (L) 0.10 - 0.95 E9/L    Eosinophils Absolute 0.01 (L) 0.05 - 0.50 E9/L    Basophils Absolute 0.01 0.00 - 0.20 E9/L    Anisocytosis 1+     Poikilocytes 1+     Schistocytes 1+     Ovalocytes 1+    Comprehensive Metabolic Panel w/ Reflex to MG   Result Value Ref Range    Sodium 138 132 - 146 mmol/L    Potassium reflex Magnesium 3.8 3.5 - 5.0 mmol/L    Chloride 102 98 - 107 mmol/L    CO2 25 22 - 29 mmol/L    Anion Gap 11 7 - 16 mmol/L    Glucose 109 (H) 74 - 99 mg/dL    BUN 25 (H) 6 - 23 mg/dL    CREATININE 1.2 (H) 0.5 - 1.0 mg/dL    GFR Non-African American 43 >=60 mL/min/1.73    GFR African American 52     Calcium 8.3 (L) 8.6 - 10.2 mg/dL    Total Protein 6.3 (L) 6.4 - 8.3 g/dL    Albumin 3.2 (L) 3.5 - 5.2 g/dL    Total Bilirubin 1.2 0.0 - 1.2 mg/dL    Alkaline Phosphatase 167 (H) 35 - 104 U/L    ALT 37 (H) 0 - 32 U/L    AST 74 (H) 0 - 31 U/L   Lactate, Sepsis   Result Value Ref Range    Lactic Acid, Sepsis 1.8 0.5 - 1.9 mmol/L   Lactate, Sepsis   Result Value Ref Range    Lactic Acid, Sepsis 0.9 0.5 - 1.9 mmol/L   Urinalysis, reflex to microscopic   Result Value Ref Range    Color, UA Yellow Straw/Yellow    Clarity, UA Clear Clear    Glucose, Ur Negative Negative mg/dL Bilirubin Urine SMALL (A) Negative    Ketones, Urine Negative Negative mg/dL    Specific Gravity, UA 1.025 1.005 - 1.030    Blood, Urine TRACE-INTACT Negative    pH, UA 5.5 5.0 - 9.0    Protein, UA 30 (A) Negative mg/dL    Urobilinogen, Urine 2.0 (A) <2.0 E.U./dL    Nitrite, Urine POSITIVE (A) Negative    Leukocyte Esterase, Urine Negative Negative   Platelet Confirmation   Result Value Ref Range    Platelet Confirmation CONFIRMED    Microscopic Urinalysis   Result Value Ref Range    WBC, UA 1-3 0 - 5 /HPF    RBC, UA 5-10 (A) 0 - 2 /HPF    Epithelial Cells, UA RARE /HPF    Bacteria, UA FEW (A) None Seen /HPF   EKG 12 Lead   Result Value Ref Range    Ventricular Rate 74 BPM    Atrial Rate 74 BPM    P-R Interval 178 ms    QRS Duration 74 ms    Q-T Interval 432 ms    QTc Calculation (Bazett) 479 ms    P Axis 55 degrees    R Axis -7 degrees    T Axis 50 degrees       RADIOLOGY:  CTA PULMONARY W CONTRAST   Final Result   1. There is no evidence of a pulmonary embolus. 2. Multifocal bilateral ground-glass pulmonary infiltrates consistent with   pneumonia and highly suspicious for COVID pneumonia   3. Significant emphysematous changes   4. Multifocal bilateral too numerous to count pulmonary nodules. The largest   on the right measures 1.5 x 1.2 cm and largest on the left measures 1.1 cm in   long axis. These nodules are unchanged in size when compared with the   patient's prior CT scan of 11/30/2021. XR CHEST PORTABLE   Final Result   1. Opacity within the left lung base abutting the left cardiac border   possibly representing pneumonia   2. Pleural based nodule seen within the lateral aspect the the left lung. This finding can be further evaluated on the pending CTA of the chest.  In   review with the patient's previous CT of the chest patient has had multiple   previous pulmonary nodules.                ------------------------- NURSING NOTES AND VITALS REVIEWED ---------------------------  Date / Time Roomed:  11/21/2021 12:55 PM  ED Bed Assignment:  18/18    The nursing notes within the ED encounter and vital signs as below have been reviewed. Patient Vitals for the past 24 hrs:   BP Temp Temp src Pulse Resp SpO2 Height Weight   11/21/21 1921  97.7 °F (36.5 °C) Oral        11/21/21 1556 (!) 106/51 100 °F (37.8 °C) Oral 66 18 96 %     11/21/21 1335      97 %     11/21/21 1325 (!) 160/91 102.4 °F (39.1 °C) Oral 80 20 (!) 86 % 5' 2\" (1.575 m) 138 lb (62.6 kg)       Oxygen Saturation Interpretation: Abnormal and Improved after treatment    ------------------------------------------ PROGRESS NOTES ------------------------------------------    Counseling:  I have spoken with the patient and discussed todays results, in addition to providing specific details for the plan of care and counseling regarding the diagnosis and prognosis. Their questions are answered at this time and they are agreeable with the plan of admission.    --------------------------------- ADDITIONAL PROVIDER NOTES ---------------------------------  Consultations:  Spoke with Dr. Karen Ledbetter. Discussed case. They will admit the patient. This patient's ED course included: a personal history and physicial examination, re-evaluation prior to disposition, multiple bedside re-evaluations, IV medications, cardiac monitoring and continuous pulse oximetry    This patient has remained hemodynamically stable during their ED course. Diagnosis:  1. Pneumonia due to COVID-19 virus    2. Acute respiratory failure with hypoxia (HCC)    3. Neutropenic fever ([facility] [address]    4. HUMBERTO (acute kidney injury) ([facility] [address]    5. Thrombocytopenia ([address]        Disposition:  Patient's disposition: Admit to med/surg floor  Patient's condition is stable.            Lizandro Menjivar DO  Resident  11/21/21 2005

## 2021-11-21 NOTE — H&P
[facility] History and Physical      CHIEF COMPLAINT: Generalized weakness, malaise, cough    History of Present Illness: This is a pleasant 39-year-old female who presented to [address] on November 21, 2021. Chief complaint as stated above. Patient is accompanied with her daughter who works in medical records and is the primary spokesperson. Patient describes symptoms of worsening malaise, weakness associated with a productive cough for the last several days. She has been vaccinated. Denies any recent travel history. Patient subsequently diagnosed with COVID-19 infection. She has been initiated on supplemental oxygen. She is comfortable on 2 L. Patient was recently diagnosed with metastatic pancreatic cancer. 1 month ago. She has been seen and evaluated by hematology oncology and did initiate chemotherapy on November 17, 2021. Associated with her chemotherapy she has had decreased appetite, loose stool, nausea and vomiting. Patient did undergo CTA chest November 21, 2021. No evidence of pulmonary embolism. Multifocal bilateral groundglass infiltrates consistent with COVID-19 pneumonia. Significant emphysema. Multiple bilateral pulmonary nodules. The largest one being on the right lung 1.5 x 1.2 cm and the largest on the left measures 1.1 cm in the long axis.       Informant(s) for H&P:    REVIEW OF SYSTEMS:  A comprehensive review of systems was negative except for: what is in the HPI      PMH:  Past Medical History:   Diagnosis Date    Anxiety     Back skin lesion     Boil     reomved from right shoulder    Cancer ([address]     RIGHT BREAST    Depression     STABLE ON MEDS    Hyperlipidemia     Hypothyroidism     Liver cancer ([address] 2021    mets to lung       Surgical History:  Past Surgical History:   Procedure Laterality Date    BREAST SURGERY Right 1987    MASTECTOMY    BREAST SURGERY Left 1989    LEFT BREAST REDUCTION    CT NEEDLE BIOPSY LIVER PERCUTANEOUS  10/29/2021    CT NEEDLE BIOPSY LIVER PERCUTANEOUS 10/29/2021 [facility] CT    HYSTERECTOMY  1989    OTHER SURGICAL HISTORY  5/10/2013    excision of cyst upper back        Medications Prior to Admission:    Prior to Admission medications    Medication Sig Start Date End Date Taking? Authorizing Provider   Handicap Placard MISC by Does not apply route 5 year 11/15/21   Abram Szymanski PA-C   apixaban (ELIQUIS) 5 MG TABS tablet Take 1 tablet by mouth 2 times daily 11/9/21 12/9/21  Abram Szymanski PA-C   aspirin 81 MG chewable tablet Take 1 tablet by mouth nightly  Patient taking differently: Take 81 mg by mouth daily  10/31/21   Sadie Avila MD   ondansetron (ZOFRAN-ODT) 4 MG disintegrating tablet Take 1 tablet by mouth every 8 hours as needed for Nausea or Vomiting 10/31/21   Sadie Avila MD   metoprolol succinate (TOPROL XL) 25 MG extended release tablet Take 1 tablet by mouth 2 times daily 10/31/21   Sadie Avila MD   ALPRAZolam Welford Bolognese) 0.25 MG tablet Take 0.25 mg by mouth daily as needed for Sleep or Anxiety. Historical Provider, MD   citalopram (CELEXA) 40 MG tablet Take 40 mg by mouth daily    Historical Provider, MD   famotidine (PEPCID) 20 MG tablet Take 40 mg by mouth daily as needed (HEARTBURN)  Patient not taking: Reported on 11/19/2021    Historical Provider, MD   levothyroxine (SYNTHROID) 25 MCG tablet Take 1 tablet by mouth Daily 10/15/21   Meliza Leong MD   atorvastatin (LIPITOR) 20 MG tablet Take 1 tablet by mouth nightly 10/15/21   Meliza Leong MD       Allergies:    Ana Brielle [cefadroxil]    Social History:    reports that she quit smoking about 31 years ago. She has a 25.00 pack-year smoking history. She has never used smokeless tobacco. She reports that she does not drink alcohol and does not use drugs.     Family History:   family history includes Diabetes (age of onset: 80) in her father; Heart Disease in her father and mother; Heart Surgery in her father; Mental Illness in her mother. PHYSICAL EXAM:  Vitals:  BP (!) 106/51   Pulse 66   Temp 100 °F (37.8 °C) (Oral)   Resp 18   Ht 5' 2\" (1.575 m)   Wt 138 lb (62.6 kg)   SpO2 96%   BMI 25.24 kg/m²     General Appearance: alert and oriented to person, place and time and in no acute distress  Skin: warm and dry  Head: normocephalic and atraumatic  Eyes: pupils equal, round, and reactive to light, extraocular eye movements intact, conjunctivae normal  Neck: neck supple and non tender without mass   Pulmonary/Chest: Diminished breath sounds bilaterally  Cardiovascular: normal rate, normal S1 and S2 and no carotid bruits  Abdomen: soft, non-tender, non-distended, normal bowel sounds, no masses or organomegaly  Extremities: no cyanosis, no clubbing and no edema  Neurologic: no cranial nerve deficit and speech normal        LABS:  Recent Labs     11/21/21  1350      K 3.8      CO2 25   BUN 25*   CREATININE 1.2*   GLUCOSE 109*   CALCIUM 8.3*       Recent Labs     11/21/21  1350   WBC 1.9*   RBC 3.75   HGB 9.6*   HCT 30.9*   MCV 82.4   MCH 25.6*   MCHC 31.1*   RDW 16.8*   PLT 50*   MPV 12.4*       No results for input(s): POCGLU in the last 72 hours. Radiology:   CTA PULMONARY W CONTRAST   Final Result   1. There is no evidence of a pulmonary embolus. 2. Multifocal bilateral ground-glass pulmonary infiltrates consistent with   pneumonia and highly suspicious for COVID pneumonia   3. Significant emphysematous changes   4. Multifocal bilateral too numerous to count pulmonary nodules. The largest   on the right measures 1.5 x 1.2 cm and largest on the left measures 1.1 cm in   long axis. These nodules are unchanged in size when compared with the   patient's prior CT scan of 11/30/2021. XR CHEST PORTABLE   Final Result   1. Opacity within the left lung base abutting the left cardiac border   possibly representing pneumonia   2. Pleural based nodule seen within the lateral aspect the the left lung.    This finding

## 2021-11-22 NOTE — CARE COORDINATION
Social Work / Discharge Planning :COVID POSITIVE: SW spoke to patient daughter Andra Almaraz. Patient resides independently . Patient does NOT use a device. Goals at discharge discussed and await therapy input as well as DME recommendations. . If patient would need SNF vs HHC vs just DME: Daughter receptive. Patient also on 02 and this is NEW. If needed at discharge, NO DME preference. Await needs at discharge. Patient PCP is DR Sundar Mason. Await plan. SW to follow.  Electronically signed by Sherolyn Canavan, LSW on 11/22/21 at 11:53 AM EST

## 2021-11-22 NOTE — CONSULTS
[address] [place] [address] [facility]  Consultation Note     Admit Date: 11/21/2021 12:55 PM    Reason for Consult:   COVID-19    Attending Physician:  Saleem Colin MD    HISTORY OF PRESENT ILLNESS:             The history is obtained from extensive review of available past medical records. The patient is a 79 y.o. female who is not previously known to the ID service. The patient has a history of pancreatic cancer. She was recently diagnosed and started chemotherapy 1 week prior to the presentation. The patient was not a candidate for aggressive regimen like full for an ox. And steroids it was recommended she receive palliative chemotherapy with Abraxane and gemcitabine  Her sister, who is vaccinated (as well as the patient all) was taking care of her and tested positive for SARS-CoV-2. The patient presents to the ED at [facility] on 11/21/2021 with shortness of breath, cough, fatigue and confusion x1 day. She had a temperature of 102.4 °F. Pulse oximetry was 86%. Her white count was 1.9. Creatinine was 1.2. Transaminases were elevated.  Lactic acid was normal.    Past Medical History:        Diagnosis Date    Anxiety     Back skin lesion     Boil     reomved from right shoulder    Cancer ([address]     RIGHT BREAST    Depression     STABLE ON MEDS    Hyperlipidemia     Hypothyroidism     Liver cancer ([facility] 2021    mets to lung     Past Surgical History:        Procedure Laterality Date    BREAST SURGERY Right 1987    MASTECTOMY    BREAST SURGERY Left 1989    LEFT BREAST REDUCTION    CT NEEDLE BIOPSY LIVER PERCUTANEOUS  10/29/2021    CT NEEDLE BIOPSY LIVER PERCUTANEOUS 10/29/2021 [facility] CT    HYSTERECTOMY  1989    OTHER SURGICAL HISTORY  5/10/2013    excision of cyst upper back      Current Medications:   Scheduled Meds:   ascorbic acid  500 mg Oral Daily    zinc sulfate  50 mg Oral Daily    baricitinib  2 mg Oral Daily    pantoprazole  40 mg IntraVENous Daily    apixaban  5 mg Oral BID    aspirin  81 mg Oral Nightly    atorvastatin  20 mg Oral Nightly    citalopram  20 mg Oral Daily    levothyroxine  25 mcg Oral Daily    metoprolol succinate  25 mg Oral BID    sodium chloride flush  5-40 mL IntraVENous 2 times per day    dexamethasone  6 mg IntraVENous Q24H    Vitamin D  2,000 Units Oral Daily     Continuous Infusions:   sodium chloride      sodium chloride 50 mL/hr at 21 1923     PRN Meds:ALPRAZolam, ondansetron, sodium chloride flush, sodium chloride, polyethylene glycol, acetaminophen **OR** acetaminophen, guaiFENesin-dextromethorphan    Allergies:  Duricef [cefadroxil]    Social History:   Social History     Socioeconomic History    Marital status:      Spouse name: None    Number of children: None    Years of education: None    Highest education level: None   Occupational History    None   Tobacco Use    Smoking status: Former Smoker     Packs/day: 1.00     Years: 25.00     Pack years: 25.00     Quit date: 1990     Years since quittin.9    Smokeless tobacco: Never Used   Vaping Use    Vaping Use: Never used   Substance and Sexual Activity    Alcohol use: No    Drug use: No    Sexual activity: None   Other Topics Concern    None   Social History Narrative    None     Social Determinants of Health     Financial Resource Strain: Low Risk     Difficulty of Paying Living Expenses: Not hard at all   Food Insecurity: No Food Insecurity    Worried About Running Out of Food in the Last Year: Never true    Akhil of Food in the Last Year: Never true   Transportation Needs: No Transportation Needs    Lack of Transportation (Medical): No    Lack of Transportation (Non-Medical):  No   Physical Activity:     Days of Exercise per Week: Not on file    Minutes of Exercise per Session: Not on file   Stress:     Feeling of Stress : Not on file   Social Connections:     Frequency of Communication with Friends and Family: Not on file    Frequency of Social Gatherings with Friends and Family: Not on file    Attends Catholic Services: Not on file    Active Member of Clubs or Organizations: Not on file    Attends Club or Organization Meetings: Not on file    Marital Status: Not on file   Intimate Partner Violence:     Fear of Current or Ex-Partner: Not on file    Emotionally Abused: Not on file    Physically Abused: Not on file    Sexually Abused: Not on file   Housing Stability:     Unable to Pay for Housing in the Last Year: Not on file    Number of [place] in the Last Year: Not on file    Unstable Housing in the Last Year: Not on file      Pets: None  Travel: No  The patient lives alone    Family History:       Problem Relation Age of Onset    Heart Disease Mother     Mental Illness Mother     Heart Disease Father     Heart Surgery Father         stents placed    Diabetes Father 80   . Otherwise non-pertinent to the chief complaint. REVIEW OF SYSTEMS:    Constitutional: Negative for fevers, chills, diaphoresis  Neurologic: Confusion  Psychiatric: Negative  Rheumatologic: Negative   Endocrine: Negative  Hematologic: Negative  Immunologic: SARS-CoV-2 vaccine up-to-date  ENT: Negative  Respiratory: As in HPI  Cardiovascular: Negative  GI: Negative  : Negative  Musculoskeletal: Negative  Skin: No rashes. PHYSICAL EXAM:    Vitals:   /60   Pulse 65   Temp 98.1 °F (36.7 °C) (Oral)   Resp 18   Ht 5' 2\" (1.575 m)   Wt 138 lb (62.6 kg)   SpO2 91%   BMI 25.24 kg/m²   Constitutional: The patient is awake, alert, and confused. She is restless. Daughter in room. Oxygen at 2.5 L nasal cannula. Skin: Warm and dry. No rashes were noted. HEENT: Eyes show round, and reactive pupils. No jaundice. Moist mucous membranes, no ulcerations, no thrush. Neck: Supple to movements. No lymphadenopathy. Chest: No use of accessory muscles to breathe. Symmetrical expansion.  Auscultation reveals crackles over the right base posteriorly. Cardiovascular: S1 and S2 are rhythmic and regular. No murmurs appreciated. Abdomen: Positive bowel sounds to auscultation. Benign to palpation. No masses felt. No hepatosplenomegaly. Extremities: No clubbing, no cyanosis, no edema.   Lines: peripheral      CBC+dif:  Recent Labs     11/21/21  1350   WBC 1.9*   HGB 9.6*   HCT 30.9*   MCV 82.4   PLT 50*   NEUTROABS 1.58*     Lab Results   Component Value Date    CRP 8.5 (H) 11/22/2021    CRP 8.0 (H) 11/21/2021      No results found for: CRPHS  No results found for: SEDRATE  Lab Results   Component Value Date    ALT 37 (H) 11/21/2021    AST 74 (H) 11/21/2021    ALKPHOS 167 (H) 11/21/2021    BILITOT 1.2 11/21/2021     Lab Results   Component Value Date     11/21/2021    K 3.8 11/21/2021     11/21/2021    CO2 25 11/21/2021    BUN 25 11/21/2021    CREATININE 1.2 11/21/2021    GFRAA 52 11/21/2021    LABGLOM 43 11/21/2021    GLUCOSE 109 11/21/2021    GLUCOSE 115 08/03/2011    PROT 6.3 11/21/2021    LABALBU 3.2 11/21/2021    LABALBU 4.5 08/03/2011    CALCIUM 8.3 11/21/2021    BILITOT 1.2 11/21/2021    ALKPHOS 167 11/21/2021    AST 74 11/21/2021    ALT 37 11/21/2021       Lab Results   Component Value Date    PROTIME 26.9 11/22/2021    INR 2.5 11/22/2021       Lab Results   Component Value Date    TSH 2.59 10/02/2019       Lab Results   Component Value Date    NITRITE NEGATIVE 05/06/2017    COLORU Yellow 11/21/2021    PHUR 5.5 11/21/2021    WBCUA 1-3 11/21/2021    RBCUA 5-10 11/21/2021    BACTERIA FEW 11/21/2021    CLARITYU Clear 11/21/2021    SPECGRAV 1.025 11/21/2021    LEUKOCYTESUR Negative 11/21/2021    UROBILINOGEN 2.0 11/21/2021    BILIRUBINUR SMALL 11/21/2021    BILIRUBINUR NEGATIVE 05/06/2017    BLOODU TRACE-INTACT 11/21/2021    GLUCOSEU Negative 11/21/2021       No results found for: HCO3, BE, O2SAT, PH, THGB, PCO2, PO2, TCO2  Radiology:      Microbiology:  Pending  Recent Labs     11/21/21  1410   BC Gram stain performed from blood culture bottle media  Gram positive cocci in clusters  *     No results for input(s): ORG in the last 72 hours. No results for input(s): Marinus Risk in the last 72 hours. Recent Labs     11/21/21  1815   STREPNEUMAGU Presumptive negative- suggests no current or recent  pneumococcal infection. Infection due to Strep pneumoniae cannot be  ruled out since the antigen present in the sample  may be below the detection limit of the test.  Normal Range:Presumptive Negative       No results for input(s): LP1UAG in the last 72 hours. No results for input(s): ASO in the last 72 hours. No results for input(s): CULTRESP in the last 72 hours. Recent Labs     11/21/21  1350 11/21/21  1913   PROCAL 0.69* 0.65*       Assessment:  · SARS-CoV-2 infection with mild to moderate viral pneumonia in a fully vaccinated patient  · Pancreatic cancer, started chemotherapy 1 week prior to the presentation  · Leukopenia secondary to chemotherapy and viral infection  · Possible superimposed bacterial pneumonia  · Bacteremia with GPC in clusters. Most likely contaminant    Plan:    · Consolidate antibiotics to Zosyn  · Start Remdesivir  · Continue Baricitinib  · Increase dose of steroids  · Check cultures, baseline ESR, CRP  · Monitor labs  · Will follow with you    Thank you for having us see this patient in consultation.  The case was discussed with Dr. Debbie Aguilar MD  4:06 PM  11/22/2021

## 2021-11-22 NOTE — ACP (ADVANCE CARE PLANNING)
Advance Care Planning     Advance Care Planning Activator (Inpatient)  Conversation Note      Date of ACP Conversation: 11/22/2021     Conversation Conducted with: Basia Ruano Daughter Legal Guardian ACP Activator: [facility] [place], [address] [facility] Decision Maker:  Basia Ruano Daughter Legal Guardian    Current Designated Health Care Decision Maker:     Primary Decision Maker: Rogelio Em - Child - [phone]  Click here to complete Healthcare Decision Makers including section of the Healthcare Decision Maker Relationship (ie \"Primary\")      Care Preferences    Ventilation: \"If you were in your present state of health and suddenly became very ill and were unable to breathe on your own, what would your preference be about the use of a ventilator (breathing machine) if it were available to you? \"      Would the patient desire the use of ventilator (breathing machine)?: yes    \"If your health worsens and it becomes clear that your chance of recovery is unlikely, what would your preference be about the use of a ventilator (breathing machine) if it were available to you? \"     Would the patient desire the use of ventilator (breathing machine)?: Yes      Resuscitation  \"CPR works best to restart the heart when there is a sudden event, like a heart attack, in someone who is otherwise healthy. Unfortunately, CPR does not typically restart the heart for people who have serious health conditions or who are very sick. \"    \"In the event your heart stopped as a result of an underlying serious health condition, would you want attempts to be made to restart your heart (answer \"yes\" for attempt to resuscitate) or would you prefer a natural death (answer \"no\" for do not attempt to resuscitate)? \" Partial status per daughter       [] Yes   [] No   Educated Patient / German Calderon regarding differences between Advance Directives and portable DNR orders.     Length of ACP Conversation in minutes:  10    Conversation Outcomes:  [] ACP discussion completed  [] Existing advance directive reviewed with patient; no changes to patient's previously recorded wishes  [] New Advance Directive completed  [] Portable Do Not Rescitate prepared for Provider review and signature  [] POLST/POST/MOLST/MOST prepared for Provider review and signature      Follow-up plan:    [] Schedule follow-up conversation to continue planning  [] Referred individual to Provider for additional questions/concerns   [] Advised patient/agent/surrogate to review completed ACP document and update if needed with changes in condition, patient preferences or care setting    [] This note routed to one or more involved healthcare providers

## 2021-11-22 NOTE — ED NOTES
Pharmacy called for medications, not sent yet to administer to patient.      Ksenia Ghotra RN  11/21/21 3007

## 2021-11-22 NOTE — PLAN OF CARE
Problem: Airway Clearance - Ineffective  Goal: Achieve or maintain patent airway  11/22/2021 1016 by Silvio Mckeon RN  Outcome: Met This Shift  11/22/2021 0219 by Staci Navarrete RN  Outcome: Met This Shift     Problem: Gas Exchange - Impaired  Goal: Absence of hypoxia  11/22/2021 1016 by Silvio Mckeon RN  Outcome: Met This Shift  11/22/2021 0219 by Staci Navarrete RN  Outcome: Met This Shift  Goal: Promote optimal lung function  11/22/2021 1016 by Silvio Mckeon RN  Outcome: Met This Shift  11/22/2021 0219 by Staci Navarrete RN  Outcome: Met This Shift     Problem: Breathing Pattern - Ineffective  Goal: Ability to achieve and maintain a regular respiratory rate  11/22/2021 1016 by Silvio Mckeon RN  Outcome: Met This Shift  11/22/2021 0219 by Staci Navarrete RN  Outcome: Met This Shift     Problem:  Body Temperature -  Risk of, Imbalanced  Goal: Ability to maintain a body temperature within defined limits  11/22/2021 1016 by Silvio Mckeon RN  Outcome: Met This Shift  11/22/2021 0219 by Staci Navarrete RN  Outcome: Met This Shift  Goal: Will regain or maintain usual level of consciousness  11/22/2021 0219 by Staci Navarrete RN  Outcome: Met This Shift  Goal: Complications related to the disease process, condition or treatment will be avoided or minimized  11/22/2021 0219 by Staci Navarrete RN  Outcome: Met This Shift     Problem: Isolation Precautions - Risk of Spread of Infection  Goal: Prevent transmission of infection  11/22/2021 0219 by Staci Navarrete RN  Outcome: Met This Shift     Problem: Nutrition Deficits  Goal: Optimize nutritional status  11/22/2021 0219 by Staci Navarrete RN  Outcome: Met This Shift     Problem: Risk for Fluid Volume Deficit  Goal: Maintain normal heart rhythm  11/22/2021 0219 by Staci Navarrete RN  Outcome: Met This Shift  Goal: Maintain absence of muscle cramping  11/22/2021 4070 by Eliel Garcia RN  Outcome: Met This Shift  Goal: Maintain normal serum potassium, sodium, calcium, phosphorus, and pH  11/22/2021 0219 by Eliel Garcia RN  Outcome: Met This Shift     Problem: Loneliness or Risk for Loneliness  Goal: Demonstrate positive use of time alone when socialization is not possible  11/22/2021 0219 by Eliel Garcia RN  Outcome: Met This Shift     Problem: Fatigue  Goal: Verbalize increase energy and improved vitality  11/22/2021 0219 by Eliel Garcia RN  Outcome: Met This Shift     Problem: Patient Education: Go to Patient Education Activity  Goal: Patient/Family Education  11/22/2021 0219 by Eliel Garcia RN  Outcome: Met This Shift     Problem: Falls - Risk of:  Goal: Will remain free from falls  Description: Will remain free from falls  11/22/2021 0219 by Eliel Garcia RN  Outcome: Met This Shift  Goal: Absence of physical injury  Description: Absence of physical injury  11/22/2021 0219 by Eliel Garcia RN  Outcome: Met This Shift

## 2021-11-22 NOTE — TELEPHONE ENCOUNTER
I was notified that patient was hospitalized with COVID and she is scheduled for port placement on 11/29/21, I called [facility] surgery scheduling and removed her off the surgery schedule. I called Dr. Jenni Juárez and spoke to Rico and made her aware that I had to cancel patients port and that I will update her on next port date once I get it scheduled. I have to wait until patient is 10 post positive and asymptomatic for 24 hours. I called daughter Sarah Bailon and made her aware of the above also and told her as soon as I can get her back on the schedule I will and I will make her aware. Rico from [facility] stated chemo is scheduled for 12/7/21.       Electronically signed by Mirian Arredondo RN on 11/22/2021 at 2:28 PM

## 2021-11-22 NOTE — PROGRESS NOTES
[facility] Progress Note    Admitting Date and Time: 11/21/2021 12:55 PM  Admit Dx: Thrombocytopenia ([address] [D69.6]  HUMBERTO (acute kidney injury) ([address] [N17.9]  Neutropenic fever ([address] [D70.9, R50.81]  Acute respiratory failure with hypoxia ([address] [J96.01]  Pneumonia due to COVID-19 virus [U07.1, J12.82]    Subjective:  Patient is being followed for Thrombocytopenia ([address] [D69.6]  HUMBERTO (acute kidney injury) ([address] [N17.9]  Neutropenic fever ([address] [D70.9, R50.81]  Acute respiratory failure with hypoxia ([address] [J96.01]  Pneumonia due to COVID-19 virus [U07.1, J12.82]     Patient was lying in bed in no acute distress. Continues to have a dry cough. Denies any new concerns. ROS: denies fever, chills, cp, sob, n/v, HA unless stated above.       ascorbic acid  500 mg Oral Daily    zinc sulfate  50 mg Oral Daily    baricitinib  2 mg Oral Daily    pantoprazole  40 mg IntraVENous Daily    ampicillin-sulbactam  3,000 mg IntraVENous Q6H    azithromycin  500 mg IntraVENous Q24H    apixaban  5 mg Oral BID    aspirin  81 mg Oral Nightly    atorvastatin  20 mg Oral Nightly    citalopram  20 mg Oral Daily    levothyroxine  25 mcg Oral Daily    metoprolol succinate  25 mg Oral BID    sodium chloride flush  5-40 mL IntraVENous 2 times per day    dexamethasone  6 mg IntraVENous Q24H    Vitamin D  2,000 Units Oral Daily     ALPRAZolam, 0.25 mg, Daily PRN  ondansetron, 4 mg, Q8H PRN  sodium chloride flush, 5-40 mL, PRN  sodium chloride, 25 mL, PRN  polyethylene glycol, 17 g, Daily PRN  acetaminophen, 650 mg, Q6H PRN   Or  acetaminophen, 650 mg, Q6H PRN  guaiFENesin-dextromethorphan, 5 mL, Q4H PRN         Objective:    /60   Pulse 65   Temp 98.1 °F (36.7 °C) (Oral)   Resp 18   Ht 5' 2\" (1.575 m)   Wt 138 lb (62.6 kg)   SpO2 91%   BMI 25.24 kg/m²   General Appearance: alert and oriented to person, place and time and in no acute distress  Skin: warm and dry  Head: normocephalic and atraumatic  Eyes: pupils equal, round, and reactive to light, extraocular eye movements intact, conjunctivae normal  Neck: neck supple and non tender without mass   Pulmonary/Chest: clear to auscultation bilaterally- no wheezes, rales or rhonchi, normal air movement, no respiratory distress  Cardiovascular: normal rate, normal S1 and S2 and no carotid bruits  Abdomen: soft, non-tender, non-distended, normal bowel sounds, no masses or organomegaly  Extremities: no cyanosis, no clubbing and no edema  Neurologic: no cranial nerve deficit and speech normal        Recent Labs     11/21/21  1350      K 3.8      CO2 25   BUN 25*   CREATININE 1.2*   GLUCOSE 109*   CALCIUM 8.3*       Recent Labs     11/21/21  1350   WBC 1.9*   RBC 3.75   HGB 9.6*   HCT 30.9*   MCV 82.4   MCH 25.6*   MCHC 31.1*   RDW 16.8*   PLT 50*   MPV 12.4*       Radiology:  EXAMINATION:  CTA OF THE CHEST 11/21/2021 3:07 pm     TECHNIQUE:  CTA of the chest was performed after the administration of intravenous  contrast.  Multiplanar reformatted images are provided for review. MIP  images are provided for review. Dose modulation, iterative reconstruction,  and/or weight based adjustment of the mA/kV was utilized to reduce the  radiation dose to as low as reasonably achievable.     COMPARISON:  None.     HISTORY:  ORDERING SYSTEM PROVIDED HISTORY: rule out PE, SOB  TECHNOLOGIST PROVIDED HISTORY:  Reason for exam:->rule out PE, SOB  Decision Support Exception - unselect if not a suspected or confirmed  emergency medical condition->Emergency Medical Condition (MA)     FINDINGS:  Pulmonary Arteries: Pulmonary arteries are adequately opacified for  evaluation. No evidence of intraluminal filling defect to suggest pulmonary  embolism. Main pulmonary artery is normal in caliber.     Mediastinum: No evidence of mediastinal lymphadenopathy. The heart and  pericardium demonstrate no acute abnormality.   There is no acute abnormality  of the thoracic aorta.     Lungs/pleura: There are emphysematous changes. Multifocal bilateral  ground-glass pulmonary infiltrates are noted most consistent with pneumonia  highly suspicious for COVID pneumonia.     Again noted there are multifocal bilateral pulmonary nodules too numerous to  count. The largest nodule on the left is seen within the superior segment of  the left lower lobe and measures 1.1 cm. This nodule is not significant  changed in size when compared with the prior study of 10/30/2021. The  largest nodule seen within the right lung is seen within the right lung base  posteriorly and measures 1.5 x 1.2 cm. This nodule is also unchanged in size  when compared with the prior study.     Upper Abdomen: Limited images of the upper abdomen are unremarkable.     Soft Tissues/Bones: No acute bone or soft tissue abnormality.     IMPRESSION:  1. There is no evidence of a pulmonary embolus. 2. Multifocal bilateral ground-glass pulmonary infiltrates consistent with  pneumonia and highly suspicious for COVID pneumonia  3. Significant emphysematous changes  4. Multifocal bilateral too numerous to count pulmonary nodules. The largest  on the right measures 1.5 x 1.2 cm and largest on the left measures 1.1 cm in  long axis. These nodules are unchanged in size when compared with the  patient's prior CT scan of 11/30/2021.       EXAMINATION:  ONE XRAY VIEW OF THE CHEST     11/21/2021 1:38 pm     COMPARISON:  10/27/2021     HISTORY:  ORDERING SYSTEM PROVIDED HISTORY: SOB  TECHNOLOGIST PROVIDED HISTORY:  Reason for exam:->SOB     FINDINGS:  The cardiac silhouette is at upper limits of normal in size.     Emphysematous changes are noted. There is an opacity seen within the left  lung base inseparable from the left cardiac border. There is a pleural-based  nodule seen on the left which represents a previously identified pleural  based nodule.     There is no right lung infiltrate.     IMPRESSION:  1.  Opacity within the left lung base abutting the left cardiac border  possibly representing pneumonia  2. Pleural based nodule seen within the lateral aspect the the left lung. This finding can be further evaluated on the pending CTA of the chest.  In  review with the patient's previous CT of the chest patient has had multiple  previous pulmonary nodules. Assessment:    Active Problems:    Acute respiratory failure with hypoxia (HCC)  Resolved Problems:    * No resolved hospital problems. *      Plan:  1. Acute respiratory failure with hypoxia: Continue Baricitinib. Continue Decadron. Continue vitamins supplementation. Currently on 2.5 L NC. Trend inflammatory markers. Isolation precautions. Prone and incentive spirometry as tolerated. 2. Metastatic pancreatic cancer: On chemotherapy. Held while infected with COVID. 3. H/o of DVT: Continue Eliquis     4. HLD: Continue Lipitor    5. Hypothyroidism: Continue Levothyroxine. 6. Low platelets: Peripheral blood smear ordered. Monitor. 7. Positive blood cultures: ID consulted. DVT prophylaxis: Eliquis    NOTE: This report was transcribed using voice recognition software. Every effort was made to ensure accuracy; however, inadvertent computerized transcription errors may be present.   Electronically signed by Kelley Esquivel PA-C on 11/22/2021 at 2:39 PM

## 2021-11-22 NOTE — ED NOTES
Patient sleeping, respirations easy, regular, without distress.      Arlene Boyd RN  11/21/21 7018 Patient: Yancy Benavides Date: 2020   : 1967 Attending: Gigi Govea MD   53 year old male        Chief complaint: hyperkalemia    HPI from initial consult on 20:   52-year-old male with a past medical history of chronic kidney disease stage 4 to 5 after being on hemodialysis for 4 months with last hemodialysis on 2020, recently admitted  through 2020 for volume overload and during that admission the patient stated that he would not want hemodialysis until fistula was placed (long palliative care discussions and discussions with his Rabbi) type 2 diabetes, hypertension, obesity, heart failure with preserved ejection fraction, pulmonary hypertension, KAYLA on CPAP, pulmonary embolism on Eliquis, previous MRSA bacteremia presenting with shortness of breath, volume overload, found to have a potassium of 7.5 with EKG changes as well as a creatinine of 7.31 for whom nephrology has been consulted for assistance with management.  The patient had vein mapping performed during his past admission with plan to have fistula placed.  He does now agree to emergent hemodialysis and initiation of hemodialysis during this admission.  He hopes to have a fistula placed during this admission.    Of note, during the patient's last admission he was hypokalemic and was started on potassium chloride 40 mEq daily.  The patient denies any dietary indiscretion.  He states that he has been urinating a little bit less but does not feel that he is retaining urine.    Interval History:   20: Dialyzed yesterday, tolerated well. Awake, comfortably lying in bed. On BiPAP. Osuna placed yesterday- low urine output, only 140 ml overnight.   20: Comfortably resting in bed, on BiPAP. Arouses to voice, however is withdrawn, appears tired, and not answering many questions verbally. Nods yes/no appropriately. Tunneled Permcath placement today. Tolerated HD well yesterday.   20: Dialyzed yesterday- ordered  4 hour treatment and pt only agreed to 3 hours. Tolerated well, 4L fluid removed. Awake, sitting up in chair. On 3L O2 NC, denies SOB. Asking for paracentesis.   6/25/20: Seen on dialysis, tolerating HD well. No CP or SOB.   6/26: Up in chair, para with 5.2L off yesterday, breathing is comfortable.   6/27/20 : Pt seen in dialysis, denies any SOB  6/28 comfortable on CPAP. No SOB or pain. 4 kg removed with HD yesterday  6/29/20 : Pt denies any SOB, no nausea or vomiting.  6/30/20 : Pt seen during dialysis, denies any SOB no nausea or vomiting.  7/1: Sleeping, on BiPAP. Awaiting outpatient HD placement.   7/2/20 : Pt seen during dialysis, denies any SOB  7/3, COMF, NAD  7/5/2020 No CP or SOB tolerated IHD     Past Medical History:   Diagnosis Date   • Blood clot associated with vein wall inflammation     PE in january   • Brain anoxic injury (CMS/HCC)     in a coma  for 6 days,    • Chronic kidney disease     stage 3   • Congestive cardiac failure (CMS/HCC)    • COPD (chronic obstructive pulmonary disease) (CMS/HCC)    • Coronary artery disease    • Diabetes mellitus (CMS/HCC)    • Encounter for removal of cardiac resynchronization therapy pacemaker (CRT-P) 2019    possible due to infection   • Essential (primary) hypertension    • Pacemaker    • Pneumonia    • Sepsis due to methicillin susceptible Staphylococcus aureus (CMS/HCC) 12/7/2018   • Sleep apnea     cpap   • Staphylococcus aureus bacteremia 12/7/2018   • Stroke (CMS/[facility])     pt denies   • UTI due to Klebsiella species 12/7/2018       Past Surgical History:   Procedure Laterality Date   • Cardiac surgery  nov 4 1025    triple bypass       Medications/Infusions:  Scheduled:   • apixaBAN  2.5 mg Oral 2 times per day   • febuxostat  20 mg Oral Daily   • fluticasone  1 spray Each Nare Daily   • bisacodyl  10 mg Oral Daily   • gabapentin  100 mg Oral TID   • amLODIPine  10 mg Oral Daily   • docusate sodium  100 mg Oral Daily   • calcium acetate gelcap  667 mg Oral  TID WC   • sodium chloride (PF)  2 mL Intracatheter 2 times per day   • cyanocobalamin  500 mcg Oral Daily   • metoPROLOL tartrate  12.5 mg Oral BID   • pantoprazole  40 mg Oral QAM AC   • tamsulosin  0.4 mg Oral Nightly   • umeclidinium bromide  1 puff Inhalation Daily Resp   • melatonin  3 mg Oral Nightly   • linaclotide  145 mcg Oral QAM AC   • insulin regular (human)   Subcutaneous TID AC       Continuous Infusions:   • sodium chloride 0.9% infusion     • sodium chloride 0.9% infusion     • dextrose 5 % infusion         ALLERGIES:   Allergen Reactions   • Heparin (Porcine) Other (See Comments)     Refusal on Church grounds (derived from pork)   • Pork Derived Products   (Food Or Med) Other (See Comments)     Refuses for [employer] reasons       Social History     Socioeconomic History   • Marital status:      Spouse name: Samantha   • Number of children: 1   • Years of education: Not on file   • Highest education level: Not on file   Occupational History     Employer: [employer]   Social Needs   • Financial resource strain: Not on file   • Food insecurity:     Worry: Not on file     Inability: Not on file   • Transportation needs:     Medical: Not on file     Non-medical: Not on file   Tobacco Use   • Smoking status: Former Smoker     Last attempt to quit: 1997     Years since quittin.5   • Smokeless tobacco: Never Used   Substance and Sexual Activity   • Alcohol use: No     Frequency: Never     Drinks per session: 1 or 2     Binge frequency: Never     Comment: Rare, once per week   • Drug use: No   • Sexual activity: Not on file   Lifestyle   • Physical activity:     Days per week: Not on file     Minutes per session: Not on file   • Stress: Not on file   Relationships   • Social connections:     Talks on phone: Not on file     Gets together: Not on file     Attends Church service: Not on file     Active member of club or organization: Not on file     Attends meetings of clubs or  organizations: Not on file     Relationship status: Not on file   • Intimate partner violence:     Fear of current or ex partner: Not on file     Emotionally abused: Not on file     Physically abused: Not on file     Forced sexual activity: Not on file   Other Topics Concern   • Not on file   Social History Narrative    ** Merged History Encounter **            Family History   Problem Relation Age of Onset   • Diabetes Maternal Grandfather    • Cancer Mother         Breast   • Cancer Paternal Grandmother         ?   • Heart Brother          age 33         ROS:   Positives stated above in HPI.  Full ROS completed and is otherwise negative.     Vital Last Value 24 Hour Range   Temperature 98.6 °F (37 °C) Temp  Min: 98.6 °F (37 °C)  Max: 99.1 °F (37.3 °C)   Pulse 74 Pulse  Min: 71  Max: 74   Respiratory 18 Resp  Min: 18  Max: 18   Blood Pressure 137/72 BP  Min: 118/71  Max: 137/72   Art BP   No data recorded   Pulse Oximetry 97 % SpO2  Min: 95 %  Max: 97 %     Vital Today Admitted   Weight 110.7 kg Weight: 131 kg   Height N/A Height: 5' 9\" (175.3 cm)   BMI N/A BMI (Calculated): 42.65     Weight over the past 48 Hours:  Patient Vitals for the past 48 hrs:   Weight   20 0628 112.4 kg   20 0600 110.7 kg        Hemodynamics:      Last Value 24 Hour Range   CVP   No data recorded   PAS/PAD   No data recorded   PCWP   No data recorded   CO   No data recorded   CI   No data recorded   SVR   No data recorded   SV02   No data recorded     Intake/Output:    Last Stool Occurrence: 1 (20 0641)    No intake/output data recorded.    I/O last 3 completed shifts:  In: -   Out: 3000 [Other:3000]    No intake or output data in the 24 hours ending 20 1317    Physical Exam:  General:COMF, NAD  HEENT: moist mucous membranes, no thrush  Neck: Supple, no JVD, no thyromegaly.  Cardiac: S1/S2 normal, no rubs, murmurs, or gallops  Pulmonary: Normal effort, on CPAP  Abdomen: distended, +ascites, improved   Neuro:  A&O. More talkative  Answering questions appropriately. No focal deficits  Skin: Warm, dry.  No rashes.   Extremities: 1+ bilateral LE edema. No cyanosis or clubbing.      Laboratory Results:    Recent Labs   Lab 07/05/20  0610 07/04/20  0703 07/03/20  0807   SODIUM 133* 134* 135   POTASSIUM 5.3* 4.8 4.3   CHLORIDE 101 99 100   CO2 26 28 26   ANIONGAP 11 12 13   BUN 52* 60* 44*   CREATININE 3.61* 3.93* 3.57*   GLUCOSE 106* 110* 112*   CALCIUM 8.5 8.8 8.5   PHOS 2.5 2.9 2.6   MG 2.2 2.2 1.9         Recent Labs   Lab 07/05/20  0610 07/04/20  0702 07/03/20  0807   WBC 3.6* 3.0* 3.3*   HGB 10.8* 10.7* 10.4*   HCT 34.5* 33.6* 32.8*   * 151 143         No results found    No results found    Urine Panel  No results found      Radiologic studies:    CXR 6/21/20:  IMPRESSION:  Mild cardiomegaly with slight fullness of central pulmonary vascularity similar findings were present on the previous exam    US Renal Complete (6/22/20):  FINDINGS:   The right kidney measures 11.1 cm and left 11.6. No stone mass or hydronephrosis. Normal-appearing amount of renal parenchyma bilaterally. Small amount of ascites in the abdomen. Bladder was contracted and not visualized.   IMPRESSION:  Normal bilateral renal ultrasound.  Small amount of ascites.       ASSESSMENT PLAN AND RECOMMENDATIONS:    · Acute kidney injury on chronic kidney disease stage IV:  ? Currently being dialyzed on TTS schedule, pending outpatient HD arrangements    ? H/o urinary retention- Osuna has been placed. Is on Flomax  ? Outpatient HD- social work helping to arrange outpatient HD  ? Febuxostat dose decreased to 20 mg daily   ? IHD per his schedule   · Hyperkalemia: 5.3 today   ? RESOLVED  · Hypervolemia:  ? BETTER  · Hyponatremia- correct with HD  · Hyperphosphatemia  ? Started binder  · Hypertension  ? Controlled- on amlodipine and metoprolol  · Anemia:  ? Hgb slowly trending down, however no current indication for LUCILLE as Hgb remains in 10s.   ? H/o iron  deficiency- rechecked iron panel (tsat 9%, ferritin 184). Started on IV iron.   · H/o Pulmonary embolism:   ? On Eliquis PTA- held on admission given need for tunneled line placement as well as likely fistula placement during this admission. Now he is back on it.  ? Of note, the patient does not accept Heparin as it is a pork product. Would need to choose alternative anticoagulation    Gloria Cuellar MD   [facility]   Pager: [phone]

## 2021-11-23 NOTE — PROGRESS NOTES
[address] [place] [address] [facility]  TALIIDA  Progress Note    SUBJECTIVE:  Chief Complaint   Patient presents with    Shortness of Breath     Pancreatic CA pt- having cough and fatigue    Cough    Fatigue     Patient is tolerating medications  No nausea, vomiting, diarrhea. On 15L NRB - 95% SpO2 when I was in the room  Incontinent of urine  Says she isn't sure how she feels    Review of systems:  As stated above in the chief complaint, otherwise negative. Medications:  Scheduled Meds:   dexamethasone  10 mg IntraVENous Q12H    Followed by   [facility] [place] ON 2021] dexamethasone  10 mg IntraVENous Q24H    budesonide-formoterol  2 puff Inhalation BID    piperacillin-tazobactam  3,375 mg IntraVENous Q8H    acetylcysteine  600 mg Oral BID    albuterol sulfate HFA  2 puff Inhalation 4x daily    ascorbic acid  500 mg Oral Daily    zinc sulfate  50 mg Oral Daily    baricitinib  2 mg Oral Daily    pantoprazole  40 mg IntraVENous Daily    remdesivir IVPB  100 mg IntraVENous Q24H    apixaban  5 mg Oral BID    aspirin  81 mg Oral Nightly    atorvastatin  20 mg Oral Nightly    citalopram  20 mg Oral Daily    levothyroxine  25 mcg Oral Daily    metoprolol succinate  25 mg Oral BID    sodium chloride flush  5-40 mL IntraVENous 2 times per day    Vitamin D  2,000 Units Oral Daily     Continuous Infusions:   sodium chloride      sodium chloride      sodium chloride 50 mL/hr at 21 2140     PRN Meds:sodium chloride, ALPRAZolam, ondansetron, sodium chloride flush, sodium chloride, polyethylene glycol, acetaminophen **OR** acetaminophen, guaiFENesin-dextromethorphan    OBJECTIVE:  BP (!) 172/81   Pulse 72   Temp 99 °F (37.2 °C) (Oral)   Resp 20   Ht 5' 2\" (1.575 m)   Wt 138 lb (62.6 kg)   SpO2 96%   BMI 25.24 kg/m²   Temp  Av.3 °F (37.4 °C)  Min: 99 °F (37.2 °C)  Max: 99.5 °F (37.5 °C)  Constitutional: The patient is awake, alert, and partially oriented. Lying on left side.    Skin: biomarkers    Recent Labs     11/21/21  1350 11/21/21  1913 11/22/21  0925 11/23/21  0812   CRP  --  8.0* 8.5*  --    PROCAL 0.69* 0.65*  --   --    FERRITIN  --  1,191  --   --    LDH  --  316*  --   --    DDIMER  --  3814 3540 3273   FIBRINOGEN  --   --  475  --    INR  --   --  2.5  --    PROTIME  --   --  26.9*  --    AST 74*  --   --  64*   ALT 37*  --   --  26       ASSESSMENT:  · SARS-CoV-2 infection with mild to moderate viral pneumonia in a fully vaccinated patient  · Pancreatic cancer, started chemotherapy 1 week prior to the presentation  · Leukopenia secondary to chemotherapy and viral infection  · Possible superimposed bacterial pneumonia  · Bacteremia with GPC in clusters. Most likely contaminant    PLAN:  · Continue Zosyn   · Continue Remdesivir, day 2/5  · Continue Baricitinib day 2/14  · Continue steroids  · Supportive care  · Check final cultures  · Monitor labs- WBC 6.6     ASTRID Demarco - CNP  12:07 PM  11/23/2021     Patient seen and examined. I had a face to face encounter with the patient. Agree with exam.  Assessment and plan as outlined above and directed by me. Addition and corrections were done as deemed appropriate. My exam and plan include: Blood cultures are CoNS. These are clearly contaminants and do not do to be treated. The patient now has a rhonchorous breathing. There is a sitter at her side. She is now Xradia. We will go ahead and discontinue antibiotics, Remdesivir and baricitinib. ID will sign off. Spoke with Dr. Shey Taylor.     Daniel Helton MD  11/23/2021  2:59 PM

## 2021-11-23 NOTE — CONSULTS
Perico Silver M.D.,[facility]  Julian Fatima D.O., F.A.C.O.I., Lex Goldberg M.D. Aliya Martins M.D. Melody Smith D.O. Patient:  Chapin Bettencourt 79 y.o. female MRN: [id number]     Date of Service: 11/23/2021      PULMONARY CONSULTATION    Reason for Consultation: Respiratory failure with worsening hypoxia and COVID-19 pneumonia  Referring Physician: Angie RESENDIZ    Communication with the referring physician will be sent via the electronic medical record. Chief Complaint: Shortness of breath, cough    CODE STATUS: Limited    Question Answer   Intubation/Re-intubation Yes   Defibrillation/Cardioversion No   Chest Compressions No   Resuscitative Medications Yes       SUBJECTIVE:  HPI:  Chapin Bettencourt is a 79 y.o.  female who we are asked to evaluate for worsening hypoxemia respiratory failure related to COVID-19 pneumonia. She has not been followed previously by her pulmonary practice. She has a past medical history significant for recent diagnosis of pancreatic cancer with mets to lung, right breast cancer, NSTEMI, depression and anxiety, hypertension, hypothyroidism. She is a former smoker of cigarettes in remission. Does not take medication for her lungs at home. She is followed by hematology oncology for hx right breast CA treated with mastectomy and adjuvant chemotherapy 1987. Has a recent unprovoked left lower extremity DVT on Eliquis. Diagnosed with adenocarcinoma of the pancreas 1 month ago. She was started on chemotherapy since diagnosis with abraxane and gemcitabine. There is metastatic involvement with widespread liver metastasis and lung metastasis with multiple pulmonary nodules in bilateral lung bases with right base 1.4 cm is the largest in size. She presented to the ED at [facility] on 11/21/2021 with worsening shortness of breath, cough, and fatigue. She was also mildly confused. She was becoming increasingly weak and had difficulty walking.   Decreased appetite. Lab testing -on admission she was found to be neutropenic white blood cells 1.9, lymphocytes 0.23, ferritin 11 9 1, hemoglobin 9.6, platelets 50, sodium 138, potassium 3.8, BUN 25 creatinine 1.2 procalcitonin 0.69>0.65, troponin 28, ALT 26 AST 64, D-dimer 3540> 3273. CA [id number]. Rapid COVID-19 test 11/21/2021 positive. Positive blood cultures with gram-positive cocci in clusters. Ill-appearing lying in bed on nonrebreather mask +15 L nasal cannula high flow. Moist cough mild accessory muscle use difficulty clearing secretions. Not able to answer questions appropriately. Stating she just wants to sleep. Fevers on admission. Started on Zosyn for superimposed bacterial pneumonia per ID service. Continues on baricitinib, remdesivir, dexamethasone, vitamins for treatment for COVID-19. Patient has been vaccinated with 2 doses of Diaz Peter for COVID-19. CTA chest obtained with no evidence of PE. There is evidence of multifocal bilateral groundglass infiltrates consistent with pneumonia. Emphysematous changes. Multifocal numerous pulmonary nodules. Largest on the right 1.5 x 1.2 cm in largest on the left 1.1 cm. Unchanged in size compared to prior scan 10/30/2021. Palliative medicine meeting with patient and family current CODE STATUS is limited agreeing to intubation at this time if needed.       Past Medical History:   Diagnosis Date    Anxiety     Back skin lesion     Boil     reomved from right shoulder    Cancer ([address]     RIGHT BREAST    Depression     STABLE ON MEDS    Hyperlipidemia     Hypothyroidism     Liver cancer ([address] 2021    mets to lung       Past Surgical History:   Procedure Laterality Date    BREAST SURGERY Right 1987    MASTECTOMY    BREAST SURGERY Left 1989    LEFT BREAST REDUCTION    CT NEEDLE BIOPSY LIVER PERCUTANEOUS  10/29/2021    CT NEEDLE BIOPSY LIVER PERCUTANEOUS 10/29/2021 [facility] CT    HYSTERECTOMY  1989    OTHER SURGICAL HISTORY  5/10/2013 excision of cyst upper back        Family History   Problem Relation Age of Onset    Heart Disease Mother     Mental Illness Mother     Heart Disease Father     Heart Surgery Father         stents placed    Diabetes Father 80       Social History:   Social History     Socioeconomic History    Marital status:      Spouse name: Not on file    Number of children: Not on file    Years of education: Not on file    Highest education level: Not on file   Occupational History    Not on file   Tobacco Use    Smoking status: Former Smoker     Packs/day: 1.00     Years: 25.00     Pack years: 25.00     Quit date: 1990     Years since quittin.9    Smokeless tobacco: Never Used   Vaping Use    Vaping Use: Never used   Substance and Sexual Activity    Alcohol use: No    Drug use: No    Sexual activity: Not on file   Other Topics Concern    Not on file   Social History Narrative    Not on file     Social Determinants of Health     Financial Resource Strain: Low Risk     Difficulty of Paying Living Expenses: Not hard at all   Food Insecurity: No Food Insecurity    Worried About [address] in the Last Year: Never true    [address] in the Last Year: Never true   Transportation Needs: No Transportation Needs    Lack of Transportation (Medical): No    Lack of Transportation (Non-Medical):  No   Physical Activity:     Days of Exercise per Week: Not on file    Minutes of Exercise per Session: Not on file   Stress:     Feeling of Stress : Not on file   Social Connections:     Frequency of Communication with Friends and Family: Not on file    Frequency of Social Gatherings with Friends and Family: Not on file    Attends Anabaptism Services: Not on file    Active Member of Clubs or Organizations: Not on file    Attends Club or Organization Meetings: Not on file    Marital Status: Not on file   Intimate Partner Violence:     Fear of Current or Ex-Partner: Not on file   Carline Egan Emotionally Abused: Not on file    Physically Abused: Not on file    Sexually Abused: Not on file   Housing Stability:     Unable to Pay for Housing in the Last Year: Not on file    Number of Places Lived in the Last Year: Not on file    Unstable Housing in the Last Year: Not on file     Smoking history: The patient is a former smoker cigarettes in remission  ETOH:   reports no history of alcohol use. Exposures: There no known exposures  Vaccines:      Immunization History   Administered Date(s) Administered    COVID-19, Pfizer, PF, 30mcg/0.3mL 01/22/2021, 02/12/2021    Influenza Virus Vaccine 10/02/2015    Influenza, High Dose (Fluzone 65 yrs and older) 09/25/2014, 09/21/2016, 09/20/2017, 09/12/2018    Influenza, Quadv, adjuvanted, 65 yrs +, IM, PF (Fluad) 10/12/2020, 10/15/2021    Influenza, Triv, inactivated, subunit, adjuvanted, IM (Fluad 65 yrs and older) 10/07/2019    Pneumococcal Conjugate 13-valent ([id number]) 10/02/2015    Pneumococcal Polysaccharide ([id number]) 03/13/2017        Home Meds: Medications Prior to Admission: predniSONE (DELTASONE) 10 MG tablet, Take 10 mg by mouth daily  mirtazapine (REMERON) 15 MG tablet, Take 15 mg by mouth nightly  prochlorperazine (COMPAZINE) 10 MG tablet, Take 10 mg by mouth every 6 hours as needed  Handicap Placard MISC, by Does not apply route 5 year  apixaban (ELIQUIS) 5 MG TABS tablet, Take 1 tablet by mouth 2 times daily  aspirin 81 MG chewable tablet, Take 1 tablet by mouth nightly (Patient taking differently: Take 81 mg by mouth daily )  ondansetron (ZOFRAN-ODT) 4 MG disintegrating tablet, Take 1 tablet by mouth every 8 hours as needed for Nausea or Vomiting  metoprolol succinate (TOPROL XL) 25 MG extended release tablet, Take 1 tablet by mouth 2 times daily  ALPRAZolam (XANAX) 0.25 MG tablet, Take 0.25 mg by mouth daily as needed for Sleep or Anxiety.   citalopram (CELEXA) 40 MG tablet, Take 40 mg by mouth daily  famotidine (PEPCID) 20 MG tablet, Take 40 mg by mouth daily as needed (HEARTBURN)   levothyroxine (SYNTHROID) 25 MCG tablet, Take 1 tablet by mouth Daily  atorvastatin (LIPITOR) 20 MG tablet, Take 1 tablet by mouth nightly (Patient taking differently: Take 40 mg by mouth nightly )    CURRENT MEDS :  Scheduled Meds:   dexamethasone  10 mg IntraVENous Q12H    Followed by   Joaquin Lu ON 11/28/2021] dexamethasone  10 mg IntraVENous Q24H    budesonide-formoterol  2 puff Inhalation BID    piperacillin-tazobactam  3,375 mg IntraVENous Q8H    acetylcysteine  600 mg Oral BID    albuterol sulfate HFA  2 puff Inhalation 4x daily    ascorbic acid  500 mg Oral Daily    zinc sulfate  50 mg Oral Daily    baricitinib  2 mg Oral Daily    pantoprazole  40 mg IntraVENous Daily    remdesivir IVPB  100 mg IntraVENous Q24H    apixaban  5 mg Oral BID    aspirin  81 mg Oral Nightly    atorvastatin  20 mg Oral Nightly    citalopram  20 mg Oral Daily    levothyroxine  25 mcg Oral Daily    metoprolol succinate  25 mg Oral BID    sodium chloride flush  5-40 mL IntraVENous 2 times per day    Vitamin D  2,000 Units Oral Daily       Continuous Infusions:   sodium chloride      sodium chloride      sodium chloride 50 mL/hr at 11/22/21 2140       Allergies   Allergen Reactions    Duricef [Cefadroxil] Hives       REVIEW OF SYSTEMS:  Constitutional: Fevers weakness fatigue  Skin: Denies pigmentation, dark lesions, and rashes   HEENT: Denies hearing loss, tinnitus, ear drainage, epistaxis, sore throat, and hoarseness. Cardiovascular: Denies palpitations, chest pain, and chest pressure.   Respiratory: Cough shortness of breath  Gastrointestinal: Decreased appetite nausea  Genitourinary: Denies dysuria, frequency, urgency or hematuria  Musculoskeletal: Weakness difficulty walking  Neurological: Denies dizziness, vertigo, headache, and focal weakness  Psychological history of anxiety and depression  Endocrine: Denies heat intolerance and cold intolerance  Hematopoietic/Lymphatic: Pancytopenia recent chemotherapy    OBJECTIVE:   BP (!) 172/81   Pulse 72   Temp 99 °F (37.2 °C) (Oral)   Resp 20   Ht 5' 2\" (1.575 m)   Wt 138 lb (62.6 kg)   SpO2 96%   BMI 25.24 kg/m²   SpO2 Readings from Last 1 Encounters:   11/23/21 96%        I/O:  No intake or output data in the 24 hours ending 11/23/21 1240  Vent Information  SpO2: 96 %                Physical Exam:  General: The patient is lying in bed ill-appearing  HEENT: Pupils are equal round and reactive to light, there are no oral lesions and no post-nasal drip   Neck: supple without adenopathy  Cardiovascular: regular rate and rhythm without murmur or gallop  Respiratory: Diffuse expiratory wheezing, coarse rhonchi, accessory muscle use, mild tachypnea    Abdomen: soft, non-tender, non-distended, normal bowel sounds  Extremities: warm, no edema, no clubbing  Skin: no rash or lesion  Neurologic: Awake stating she just wants to go to sleep    Pulmonary Function Testing none      Imaging personally reviewed:  CTA chest    1. There is no evidence of a pulmonary embolus. 2. Multifocal bilateral ground-glass pulmonary infiltrates consistent with   pneumonia and highly suspicious for COVID pneumonia   3. Significant emphysematous changes   4. Multifocal bilateral too numerous to count pulmonary nodules.  The largest   on the right measures 1.5 x 1.2 cm and largest on the left measures 1.1 cm in   long axis.  These nodules are unchanged in size when compared with the   patient's prior CT scan of 11/30/2021.                 Echo:  Summary   Normal left ventricle size and systolic function. Ejection fraction is visually estimated at 60-65%. No regional wall motion abnormalities seen. Moderate concentric left ventricular hypertrophy. Indeterminate diastolic function. Normal right ventricular size and function. TAPSE 18 mm   Moderate to severe mitral regurgitation with posteriorly directed jet.    No hemodynamically significant aortic stenosis is present. Unable to estimate PA systolic pressure. No evidence for hemodynamically significant pericardial effusion. Signature    Labs:  Lab Results   Component Value Date    WBC 6.6 11/23/2021    HGB 8.3 11/23/2021    HCT 26.5 11/23/2021    MCV 81.8 11/23/2021    MCH 25.6 11/23/2021    MCHC 31.3 11/23/2021    RDW 17.1 11/23/2021    PLT 43 11/23/2021    MPV 11.9 11/23/2021     Lab Results   Component Value Date     11/23/2021    K 4.3 11/23/2021     11/23/2021    CO2 21 11/23/2021    BUN 30 11/23/2021    CREATININE 1.0 11/23/2021    LABALBU 2.6 11/23/2021    LABALBU 4.5 08/03/2011    CALCIUM 7.8 11/23/2021    GFRAA >60 11/23/2021    LABGLOM 53 11/23/2021     Lab Results   Component Value Date    PROTIME 26.9 11/22/2021    INR 2.5 11/22/2021     No results for input(s): PROBNP in the last 72 hours. No results for input(s): TROPONINI in the last 72 hours. Recent Labs     11/21/21 1913   PROCAL 0.65*     This SmartLink has not been configured with any valid records. Micro:  No results for input(s): CULTRESP in the last 72 hours. No results for input(s): LABGRAM in the last 72 hours. No results for input(s): LEGUR in the last 72 hours. Recent Labs     11/21/21 1815   STREPNEUMAGU Presumptive negative- suggests no current or recent  pneumococcal infection. Infection due to Strep pneumoniae cannot be  ruled out since the antigen present in the sample  may be below the detection limit of the test.  Normal Range:Presumptive Negative       No results for input(s): LP1UAG in the last 72 hours. Assessment:  1. Acute respiratory failure with hypoxia  2. Moderate COVID-19 pneumonia- fully vaccinated 2 doses Pfizer  3. Superimposed bacterial pneumonia  4. Bacteremia likely contaminant, gram positive cocci  5. Mild lymphopenia and transaminitis related to viral infection  6.  Adenocarcinoma of the pancreas with widespread metastasis to liver and lungs diagnosed 1 month ago- receiving abraxane and gemcitabine as OP  7. History of breast cancer 1987 with mastectomy and chemotherapy  8. Pancytopenia-recent chemotherapy, immunocompromised host  9. Hypertension  10. Former smoker in remission    Plan:  1. Oxygen therapy 15 L nasal cannula plus nonrebreather mask-keep greater than 88% May need air Vo  2. Add NIV for respiratory support -Bipap 12/6  3. Check ABGs and chest x-ray  4. Stop IV fluids  5. Treatment for COVID-19 per ID service-remdesivir, dexamethasone 10 mg twice daily, vitamins, baricitinib 2 mg daily-with renal adjustment  6. Zosyn for superimposed bacterial pneumonia  7. Scheduled bronchodilators-albuterol 4 times daily, Symbicort twice daily, Mucomyst orally  8. NT suction to help with secretion clearance  9. Trend inflammatory markers  10. DVT prophylaxis-Eliquis 5 mg twice daily  11. GI prophylaxis-Protonix  12. CODE STATUS is limited-patient is agreeing to intubation at this present time. Palliative medicine following. Patient doing poorly overall with a very poor prognosis. Would recommend DNI. Thank you for allowing me to participate in the care of [facility]. Please feel free to call with questions. This plan of care was reviewed in collaboration with Dr. Shey Taylor    Electronically signed by ASTRID Chaparro CNP on 11/23/2021 at 12:40 PM      Note: This report was completed utilizing computer voice recognition software. Every effort has been made to ensure accuracy, however; inadvertent computerized transcription errors may be present      I personally saw, examined, and cared for the patient. Labs, medications, radiographs reviewed. I agree with history exam and plans detailed in NP note. Poor prognosis. Comfort measures appropriate if there is any further decline.     Bunny Daley, DO

## 2021-11-23 NOTE — PLAN OF CARE
Problem: Airway Clearance - Ineffective  Goal: Achieve or maintain patent airway  Outcome: Met This Shift     Problem: Falls - Risk of:  Goal: Will remain free from falls  Description: Will remain free from falls  11/23/2021 0731 by Tori Pyle RN  Outcome: Met This Shift

## 2021-11-23 NOTE — PROGRESS NOTES
Code status was modified after talking to the Legal guardian Lisset Feliciano and per the DNR paperwork that was signed in the ER, that's in the patients softchart.

## 2021-11-23 NOTE — PROGRESS NOTES
Suctioned patient via naso/tracheal. Patient de-stated to 80 percent, patient recovered to 90 percent with 15 liters of high flow nasal cannula and 15 liters of non re breather. Patient is comfortable.

## 2021-11-23 NOTE — CARE COORDINATION
Spoke with legal guardian daughter Anastasia Jean Baptiste by phone ; updated her on discharge plan. Await PT/OT evals; receptive to DILSHAD vs HHC. Also has no preference for DME;should 02 be required. Hollis Smith from [facility] following. Reilly Lares relayed to me that code status for pt is NO compressions, may use medications. May intubate vent  if required as a result of covid infection. ( SEE ACP). Will follow. Daniel Carter.

## 2021-11-23 NOTE — PROGRESS NOTES
Code status changed per DNR paperwork in softchart. Primary RN to verify wishes with patient and family.

## 2021-11-23 NOTE — ACP (ADVANCE CARE PLANNING)
Advance Care Planning     Advance Care Planning Activator (Inpatient)  Conversation Note      Date of ACP Conversation: 11/23/2021     Conversation Conducted with: Navid Jo legal guardian (daughter)    ACP Activator: Trixie Hull RN        Health Care Decision Maker:     Current Designated Health Care Decision Maker:     Primary Decision Maker: Ramila Durant - [place] - [phone]  Click here to complete Healthcare Decision Makers including section of the Healthcare Decision Maker Relationship (ie \"Primary\")      Care Preferences    Ventilation: \"If you were in your present state of health and suddenly became very ill and were unable to breathe on your own, what would your preference be about the use of a ventilator (breathing machine) if it were available to you? \"      Would the patient desire the use of ventilator (breathing machine)?: yes    \"If your health worsens and it becomes clear that your chance of recovery is unlikely, what would your preference be about the use of a ventilator (breathing machine) if it were available to you? \"     Would the patient desire the use of ventilator (breathing machine)?: Yes      Resuscitation  \"CPR works best to restart the heart when there is a sudden event, like a heart attack, in someone who is otherwise healthy. Unfortunately, CPR does not typically restart the heart for people who have serious health conditions or who are very sick. \"    \"In the event your heart stopped as a result of an underlying serious health condition, would you want attempts to be made to restart your heart (answer \"yes\" for attempt to resuscitate) or would you prefer a natural death (answer \"no\" for do not attempt to resuscitate)? \" no  No compression   May use medications   [x] Yes   [] No   Educated Patient / Cheryle Bane regarding differences between Advance Directives and portable DNR orders.     Length of ACP Conversation in minutes:  10    Conversation Outcomes:  [x] ACP discussion completed  [] Existing advance directive reviewed with patient; no changes to patient's previously recorded wishes  [] New Advance Directive completed  [] Portable Do Not Rescitate prepared for Provider review and signature  [] POLST/POST/MOLST/MOST prepared for Provider review and signature      Follow-up plan:    [] Schedule follow-up conversation to continue planning  [x] Referred individual to Provider for additional questions/concerns   [] Advised patient/agent/surrogate to review completed ACP document and update if needed with changes in condition, patient preferences or care setting    [] This note routed to one or more involved healthcare providers

## 2021-11-23 NOTE — PROGRESS NOTES
Physical Therapy    Facility/Department: [address] MED SURG/TELE     NAME: Eliseo Michael  : 1940  MRN: [id number]    Date of Service: 2021    PT consult was received and eval was attempted. Per nurse pt is having a decline in her medical status at this time and O2 sats are dropping. Will re-attempt PT Eval another time. Dorcas Ty., P.T.   License Number: [id number]

## 2021-11-23 NOTE — CONSULTS
Palliative Care Department  [phone]  Palliative Care Initial Consult  Provider Marbella Fan MD      PATIENT: Delia Vigil  : 1940  MRN: [id number]  ADMISSION DATE: 2021 12:55 PM  Referring Provider: Malik Hoyos MD    Palliative Medicine was consulted on hospital day 2 for assistance with Goals of care    HPI:     Dyan Self is a 79 y.o. y/o female with a history of pancreatic cancer. She was recently diagnosed and started chemotherapy 1 week prior to the presentation. who [facility] on 2021 with SOB. Pt was positive for covid. ASSESSMENT/PLAN:     Pertinent Hospital Diagnoses      Covid 19 pna   Acute resp failure   Pancreatic cancer    Palliative Care Encounter / Counseling Regarding Goals of Care  Please see detailed goals of care discussion as below   At this time, Delia Vigil, Does Not have capacity for medical decision-making. Capacity is time limited and situation/question specific   Outcome of goals of care meeting: Had a discussion with legal guardian, daughter Lorna Chakraborty. Spoke about Covid complications, how she may require more oxygen. And if she needs to be placed on a ventilator, she will get off of it. She would like to have a discussion with her family before making any decisions.  Code status Limited DNRCCA   Advanced Directives: no POA or living will in [facility]   Surrogate/Legal NOK:  Ignacio Epstein, Legal Guardian POA,  [phone]  Humaira Taylor - Child - [phone]    Spiritual assessment: no spiritual distress identified  Bereavement and grief: to be determined  Referrals to: none today    Thank you for the opportunity to participate in the care of Delia Vigil. Marbella Fan MD  Palliative Medicine     SUBJECTIVE:     Details of Conversation: Met patient at bedside, had a discussion with daughter about Covid and also pancreatic cancer.   We spoke about wanting to pursue treatment for her, along with advanced directives. She does see her mother being very weak. I explained about Covid and the complications that it may cause, about if she were to get intubated she may never get off of it. She understood what I was telling her, she works as a nurse for a school. She needs to have this discussion with her other siblings. After that she will make a decision. She wants to continue pursuing treatment for her, I think the best goal for her should be DNR CCA/DNI. Comfort and support were provided    Prognosis: Poor    OBJECTIVE:     BP (!) 172/81   Pulse 72   Temp 99 °F (37.2 °C) (Oral)   Resp 20   Ht 5' 2\" (1.575 m)   Wt 138 lb (62.6 kg)   SpO2 96%   BMI 25.24 kg/m²     Physical Examination:  Due to the current efforts to prevent transmission of COVID-19 and also the need to preserve PPE for other caregivers, a face-to-face encounter with the patient was not performed. That being said, all relevant records and diagnostic tests were reviewed, including laboratory results and imaging. Please reference any relevant documentation elsewhere. Care will be coordinated with the primary service and other specialties as appropriate. Objective data reviewed: labs, images, records, medication use, vitals and chart    Time/Communication  Greater than 50% of time spent, total 60 minutes in counseling and coordination of care at the bedside regarding goals of care. Thank you for allowing Palliative Medicine to participate in the care of [facility]. Note: This report was completed using computerize voiced recognition software. Every effort has been made to ensure accuracy; however, inadvertent computerized transcription errors may be present.

## 2021-11-23 NOTE — PROGRESS NOTES
Spoke with patient's daughter, she is also her legal guardian. After discussing with her family members and pt's PCP, She does not want to have her placed on a ventilatory support, she wants for her to pass away peacefully and naturally. Comfort medications are ordered, morphine for dyspnea and pain, Ativan for anxiety and agitation, along with Robinul for secretions.      Kareem Simms MD

## 2021-11-23 NOTE — PROGRESS NOTES
[facility] Progress Note    Admitting Date and Time: 11/21/2021 12:55 PM  Admit Dx: Thrombocytopenia ([address] [D69.6]  HUMBERTO (acute kidney injury) ([address] [N17.9]  Neutropenic fever ([address] [D70.9, R50.81]  Acute respiratory failure with hypoxia ([address] [J96.01]  Pneumonia due to COVID-19 virus [U07.1, J12.82]    Subjective:  Patient is being followed for Thrombocytopenia ([address] [D69.6]  HUMBERTO (acute kidney injury) ([address] [N17.9]  Neutropenic fever ([address] [D70.9, R50.81]  Acute respiratory failure with hypoxia ([address] [J96.01]  Pneumonia due to COVID-19 virus [U07.1, J12.82]     Patient was lying in bed in no acute distress. Continues to have a dry cough. Denies any new concerns. ROS: denies fever, chills, cp, sob, n/v, HA unless stated above.       ascorbic acid  500 mg Oral Daily    zinc sulfate  50 mg Oral Daily    baricitinib  2 mg Oral Daily    pantoprazole  40 mg IntraVENous Daily    dexamethasone  6 mg Oral 2 times per day    remdesivir IVPB  100 mg IntraVENous Q24H    apixaban  5 mg Oral BID    aspirin  81 mg Oral Nightly    atorvastatin  20 mg Oral Nightly    citalopram  20 mg Oral Daily    levothyroxine  25 mcg Oral Daily    metoprolol succinate  25 mg Oral BID    sodium chloride flush  5-40 mL IntraVENous 2 times per day    Vitamin D  2,000 Units Oral Daily     sodium chloride, 30 mL, PRN  ALPRAZolam, 0.25 mg, Daily PRN  ondansetron, 4 mg, Q8H PRN  sodium chloride flush, 5-40 mL, PRN  sodium chloride, 25 mL, PRN  polyethylene glycol, 17 g, Daily PRN  acetaminophen, 650 mg, Q6H PRN   Or  acetaminophen, 650 mg, Q6H PRN  guaiFENesin-dextromethorphan, 5 mL, Q4H PRN         Objective:    BP (!) 172/81   Pulse 72   Temp 99 °F (37.2 °C) (Oral)   Resp 20   Ht 5' 2\" (1.575 m)   Wt 138 lb (62.6 kg)   SpO2 96%   BMI 25.24 kg/m²   General Appearance: alert and oriented to person, place and time and in no acute distress  Skin: warm and dry  Head: normocephalic and atraumatic  Eyes: pupils equal, round, and reactive to light, extraocular eye movements intact, conjunctivae normal  Neck: neck supple and non tender without mass   Pulmonary/Chest: clear to auscultation bilaterally- no wheezes, rales or rhonchi, normal air movement, no respiratory distress  Cardiovascular: normal rate, normal S1 and S2 and no carotid bruits  Abdomen: soft, non-tender, non-distended, normal bowel sounds, no masses or organomegaly  Extremities: no cyanosis, no clubbing and no edema  Neurologic: no cranial nerve deficit and speech normal        Recent Labs     11/21/21  1350      K 3.8      CO2 25   BUN 25*   CREATININE 1.2*   GLUCOSE 109*   CALCIUM 8.3*       Recent Labs     11/21/21  1350   WBC 1.9*   RBC 3.75   HGB 9.6*   HCT 30.9*   MCV 82.4   MCH 25.6*   MCHC 31.1*   RDW 16.8*   PLT 50*   MPV 12.4*       Radiology:  EXAMINATION:  CTA OF THE CHEST 11/21/2021 3:07 pm     TECHNIQUE:  CTA of the chest was performed after the administration of intravenous  contrast.  Multiplanar reformatted images are provided for review. MIP  images are provided for review. Dose modulation, iterative reconstruction,  and/or weight based adjustment of the mA/kV was utilized to reduce the  radiation dose to as low as reasonably achievable.     COMPARISON:  None.     HISTORY:  ORDERING SYSTEM PROVIDED HISTORY: rule out PE, SOB  TECHNOLOGIST PROVIDED HISTORY:  Reason for exam:->rule out PE, SOB  Decision Support Exception - unselect if not a suspected or confirmed  emergency medical condition->Emergency Medical Condition (MA)     FINDINGS:  Pulmonary Arteries: Pulmonary arteries are adequately opacified for  evaluation. No evidence of intraluminal filling defect to suggest pulmonary  embolism. Main pulmonary artery is normal in caliber.     Mediastinum: No evidence of mediastinal lymphadenopathy. The heart and  pericardium demonstrate no acute abnormality.   There is no acute abnormality  of the thoracic aorta.     Lungs/pleura: There are emphysematous changes. Multifocal bilateral  ground-glass pulmonary infiltrates are noted most consistent with pneumonia  highly suspicious for COVID pneumonia.     Again noted there are multifocal bilateral pulmonary nodules too numerous to  count. The largest nodule on the left is seen within the superior segment of  the left lower lobe and measures 1.1 cm. This nodule is not significant  changed in size when compared with the prior study of 10/30/2021. The  largest nodule seen within the right lung is seen within the right lung base  posteriorly and measures 1.5 x 1.2 cm. This nodule is also unchanged in size  when compared with the prior study.     Upper Abdomen: Limited images of the upper abdomen are unremarkable.     Soft Tissues/Bones: No acute bone or soft tissue abnormality.     IMPRESSION:  1. There is no evidence of a pulmonary embolus. 2. Multifocal bilateral ground-glass pulmonary infiltrates consistent with  pneumonia and highly suspicious for COVID pneumonia  3. Significant emphysematous changes  4. Multifocal bilateral too numerous to count pulmonary nodules. The largest  on the right measures 1.5 x 1.2 cm and largest on the left measures 1.1 cm in  long axis. These nodules are unchanged in size when compared with the  patient's prior CT scan of 11/30/2021.       EXAMINATION:  ONE XRAY VIEW OF THE CHEST     11/21/2021 1:38 pm     COMPARISON:  10/27/2021     HISTORY:  ORDERING SYSTEM PROVIDED HISTORY: SOB  TECHNOLOGIST PROVIDED HISTORY:  Reason for exam:->SOB     FINDINGS:  The cardiac silhouette is at upper limits of normal in size.     Emphysematous changes are noted. There is an opacity seen within the left  lung base inseparable from the left cardiac border. There is a pleural-based  nodule seen on the left which represents a previously identified pleural  based nodule.     There is no right lung infiltrate.     IMPRESSION:  1.  Opacity within the left lung base abutting the left cardiac border  possibly representing pneumonia  2. Pleural based nodule seen within the lateral aspect the the left lung. This finding can be further evaluated on the pending CTA of the chest.  In  review with the patient's previous CT of the chest patient has had multiple  previous pulmonary nodules. Assessment:    Active Problems:    Acute respiratory failure with hypoxia (HCC)  Resolved Problems:    * No resolved hospital problems. *      Plan:  1. Acute respiratory failure with hypoxia: Continue Baricitinib. Continue Remdesivir. Increase Decadron 10 mg BID. Continue vitamins supplementation. O2 needs increased from 2.5 L NC to 15L NC and 15L NRB overnight. Trend inflammatory markers. Isolation precautions. Prone and incentive spirometry as tolerated. Pulmonary following. CXR 11/23 showed extensive worsening of bl infiltrates. ABGs ordered. 2. Metastatic pancreatic cancer: On chemotherapy. Held while infected with COVID. 3. H/o of DVT: Continue Eliquis     4. HLD: Continue Lipitor    5. Hypothyroidism: Continue Levothyroxine. 6. Low platelets: Peripheral blood smear ordered. Monitor. 7. Bacteremia: Continue Zosyn. ID consulted. Palliative was consulted due to confusion on code status papers. Pallative spoke to family and they would like to change code status to [facility]. DVT prophylaxis: Eliquis    NOTE: This report was transcribed using voice recognition software. Every effort was made to ensure accuracy; however, inadvertent computerized transcription errors may be present.   Electronically signed by Krunal Mendes PA-C on 11/23/2021 at 8:33 AM

## 2021-11-24 NOTE — DISCHARGE SUMMARY
[facility] Physician Discharge Summary       No follow-up provider specified. Activity level: As tolerated     Dispo: Hospice House     Condition on discharge: Stable     Patient ID:  Veronica Partida  [id number]  79 y.o.  1940    Admit date: 11/21/2021    Discharge date and time:  11/24/2021  1:13 PM    Admission Diagnoses: Active Problems:    Acute respiratory failure with hypoxia (HCC)  Resolved Problems:    * No resolved hospital problems. *      Discharge Diagnoses: Active Problems:    Acute respiratory failure with hypoxia (HCC)  Resolved Problems:    * No resolved hospital problems. *      Consults:  IP CONSULT TO INFECTIOUS DISEASES  IP CONSULT TO PHARMACY  IP CONSULT TO IV TEAM  IP CONSULT TO INFECTIOUS DISEASES  IP CONSULT TO PHARMACY  IP CONSULT TO PALLIATIVE CARE  IP CONSULT TO PULMONOLOGY  IP CONSULT TO HOSPICE    Procedures: None    Hospital Course:   Patient Veronica munoz a 79 y.o. presented with Thrombocytopenia ([address] [D69.6]  HUMBERTO (acute kidney injury) ([address] [N17.9]  Neutropenic fever ([address] [D70.9, R50.81]  Acute respiratory failure with hypoxia ([facility] [J96.01]  Pneumonia due to COVID-19 virus [U07.1, ]    79year old female with a history of metastatic pancreatic cancer for which she started chemo on Nov 17 , anxiety, depression, hyperlipidemia, and hypothyroidism  presented to the ED for malaise, weakness and cough. In the ED patient was sating on 86% on RA, she was placed on 2L NC. COVID positive. CTA negative for PE and showed multiple pulmonary nodules, shown on previous imaging. Pancytopenia was also present secondary to chemotherapy. Patient was admitted for further treatment. She was started on Decadron, Remdesivir and Baricitinib. Patient was bacteremic and thought to have superimposed bacterial PNA, ID was consulted who started the patient on Zosyn. Overnight patient's oxygen requirements increased to 15L NC and 15L NRB. Pulmonary was consulted.  Gianluca had NT suction for secretion clearance. Prognosis was poor, so palliative was consulted. Family agreed they would like to change code status to [facility]. Hospice was consulted and patient will bed discharged to hospice house with comfort care. Discharge Exam:  General Appearance: sleeping in no acute distress  Skin: warm and dry  Head: normocephalic and atraumatic  Neck: neck supple and non tender without mass   Pulmonary/Chest: diffuse wheezing, coarse rhonchi, accessory muscle use  Cardiovascular: normal rate, normal S1 and S2 and no carotid bruits  Abdomen: soft, non-tender, non-distended, normal bowel sounds, no masses or organomegaly  Extremities: no cyanosis, no clubbing and no edema  Neurologic: no cranial nerve deficit and speech normal    No intake/output data recorded. No intake/output data recorded. LABS:  Recent Labs     11/21/21  1350 11/23/21  0812    137   K 3.8 4.3    105   CO2 25 21*   BUN 25* 30*   CREATININE 1.2* 1.0   GLUCOSE 109* 119*   CALCIUM 8.3* 7.8*       Recent Labs     11/21/21  1350 11/23/21  0812   WBC 1.9* 6.6   RBC 3.75 3.24*   HGB 9.6* 8.3*   HCT 30.9* 26.5*   MCV 82.4 81.8   MCH 25.6* 25.6*   MCHC 31.1* 31.3*   RDW 16.8* 17.1*   PLT 50* 43*   MPV 12.4* 11.9       No results for input(s): POCGLU in the last 72 hours. Imaging:  XR CHEST PORTABLE    Result Date: 11/21/2021  EXAMINATION: ONE XRAY VIEW OF THE CHEST 11/21/2021 1:38 pm COMPARISON: 10/27/2021 HISTORY: ORDERING SYSTEM PROVIDED HISTORY: SOB TECHNOLOGIST PROVIDED HISTORY: Reason for exam:->SOB FINDINGS: The cardiac silhouette is at upper limits of normal in size. Emphysematous changes are noted. There is an opacity seen within the left lung base inseparable from the left cardiac border. There is a pleural-based nodule seen on the left which represents a previously identified pleural based nodule. There is no right lung infiltrate.      1. Opacity within the left lung base abutting the left cardiac border possibly representing pneumonia 2. Pleural based nodule seen within the lateral aspect the the left lung. This finding can be further evaluated on the pending CTA of the chest.  In review with the patient's previous CT of the chest patient has had multiple previous pulmonary nodules. CTA PULMONARY W CONTRAST    Result Date: 11/21/2021  EXAMINATION: CTA OF THE CHEST 11/21/2021 3:07 pm TECHNIQUE: CTA of the chest was performed after the administration of intravenous contrast.  Multiplanar reformatted images are provided for review. MIP images are provided for review. Dose modulation, iterative reconstruction, and/or weight based adjustment of the mA/kV was utilized to reduce the radiation dose to as low as reasonably achievable. COMPARISON: None. HISTORY: ORDERING SYSTEM PROVIDED HISTORY: rule out PE, SOB TECHNOLOGIST PROVIDED HISTORY: Reason for exam:->rule out PE, SOB Decision Support Exception - unselect if not a suspected or confirmed emergency medical condition->Emergency Medical Condition (MA) FINDINGS: Pulmonary Arteries: Pulmonary arteries are adequately opacified for evaluation. No evidence of intraluminal filling defect to suggest pulmonary embolism. Main pulmonary artery is normal in caliber. Mediastinum: No evidence of mediastinal lymphadenopathy. The heart and pericardium demonstrate no acute abnormality. There is no acute abnormality of the thoracic aorta. Lungs/pleura: There are emphysematous changes. Multifocal bilateral ground-glass pulmonary infiltrates are noted most consistent with pneumonia highly suspicious for COVID pneumonia. Again noted there are multifocal bilateral pulmonary nodules too numerous to count. The largest nodule on the left is seen within the superior segment of the left lower lobe and measures 1.1 cm. This nodule is not significant changed in size when compared with the prior study of 10/30/2021.   The largest nodule seen within the right lung is seen within the right lung base posteriorly and measures 1.5 x 1.2 cm. This nodule is also unchanged in size when compared with the prior study. Upper Abdomen: Limited images of the upper abdomen are unremarkable. Soft Tissues/Bones: No acute bone or soft tissue abnormality. 1. There is no evidence of a pulmonary embolus. 2. Multifocal bilateral ground-glass pulmonary infiltrates consistent with pneumonia and highly suspicious for COVID pneumonia 3. Significant emphysematous changes 4. Multifocal bilateral too numerous to count pulmonary nodules. The largest on the right measures 1.5 x 1.2 cm and largest on the left measures 1.1 cm in long axis. These nodules are unchanged in size when compared with the patient's prior CT scan of 11/30/2021.        Patient Instructions:      Medication List      STOP taking these medications    ALPRAZolam 0.25 MG tablet  Commonly known as: XANAX     apixaban 5 MG Tabs tablet  Commonly known as: Eliquis     aspirin 81 MG chewable tablet     atorvastatin 20 MG tablet  Commonly known as: LIPITOR     citalopram 40 MG tablet  Commonly known as: CELEXA     famotidine 20 MG tablet  Commonly known as: PEPCID     Handicap Placard Misc     levothyroxine 25 MCG tablet  Commonly known as: SYNTHROID     metoprolol succinate 25 MG extended release tablet  Commonly known as: TOPROL XL     mirtazapine 15 MG tablet  Commonly known as: REMERON     ondansetron 4 MG disintegrating tablet  Commonly known as: ZOFRAN-ODT     predniSONE 10 MG tablet  Commonly known as: DELTASONE     prochlorperazine 10 MG tablet  Commonly known as: COMPAZINE              Note that more than 30 minutes was spent in preparing discharge papers, discussing discharge with patient, medication review, etc.    Signed:  Electronically signed by Charmayne Lamer, PA-C on 11/24/2021 at 1:13 PM

## 2021-11-24 NOTE — CARE COORDINATION
Hospice consult noted. Discussed with family at bedside and they are agreeable. Also discussed potential need for Hospice House services, to which they are also agreeable. Choices of hospice agencies discussed---they [facility]. Consult called to Melly Lima.

## 2021-11-24 NOTE — PLAN OF CARE
Problem: Airway Clearance - Ineffective  Goal: Achieve or maintain patent airway  11/23/2021 2252 by Diego Howe RN  Outcome: Met This Shift  11/23/2021 1724 by Maribel Perea RN  Outcome: Met This Shift     Problem: Loneliness or Risk for Loneliness  Goal: Demonstrate positive use of time alone when socialization is not possible  Outcome: Met This Shift

## 2021-11-24 NOTE — PROGRESS NOTES
[facility] [place]     Liaison Information Visit Note              Patient Name: Maribel Salamanca   Admit date:  11/21/2021   Hospital Admitting Physician:  Chyna Guadarrama DO   PCP:  Syed Montesinos MD  Primary Insurance: Payor: Neftaly Velasquez /  /  /    Emergency Contact:   Advance Directive:  yes  {DPOA-HC Name-Relation: Mariangel Arroyo   Phone: [phone]    Terminal Diagnosis Acute hypoxic respiratory failure due to covid pna as confirmed by Dr. Pedro Signs Problem List:   Patient Active Problem List   Diagnosis Code    Hyperlipidemia E78.5    Anxiety F41.9    Reactive depression F32.9    Hypothyroid E03.9    Grief reaction F43.21    NSTEMI (non-ST elevated myocardial infarction) ([facility] [address] I21.4    Pancreatic mass K86.89    Liver masses R16.0    Acute respiratory failure with hypoxia ([facility] [address] J96.01     Code Status Order: DNR-CC   Allergies:  Duricef [cefadroxil]  Family Goal: Comfort Care  Meeting held with Daughter Yolie Pino and Carrol Aguiar and sister Andrew Ma  The hospice benefit and philosophy were explained including that hospice is end of life care in which, per Medicare, a patient has a terminal diagnosis that life expectancy would be 6 months or less. Hospice care is a service that is covered by most insurance plans. The following levels of hospice care were discussed including, routine level of hospice care at private home or facility, which patient/family is responsible for any room and board fees at the facility, and general in patient level of care (GIP) at the [facility] for short term symptom management. Per Medicare guidelines, a patient is considered appropriate for GIP if they have uncontrolled symptoms such as pain, agitation, labored breathing or nausea/vomiting. Once symptoms become managed, the patient would need to be moved to a lower level of care such as home with hospice, or ECF with hospice.   Family informed that with the routine level of care at home or ECF,  the hospice team consists of the RN who visits 1-3 times a week, a  who visits within the first five days of the hospice election, the personal care team who visit 1-3 times a week, non-medical volunteers and Chaplains. Explained that at home in routine level of care, familles are responsible for the 24 hour care. Discussed that under hospice care patient would not receive chemotherapy, radiation, immune therapy, IV antibiotics, dialysis or blood transfusions. Explained that once in hospice care, all aggressive treatments would be stopped and allow nature to takes its course with focus on comfort care for the patient. Met with family at patient's bedside. They are all aware of hospice and have used hospice of the [place] prior. Plan is to evaluate for inpatient level of care hospice at [facility]. Discharge Plan:  Discharge Disposition; pending  HOTV plan:  1. Evaluate for inpatient level of care hospice   2. Please call Kate Perez [phone] with any questions. 3. Patient not currently under the care of hospice. Electronically signed by Seferino Schwab, RN on 11/24/2021 at 10:59 AM     GIP Note    Shabbir Rosas is an 79year old female. She present on 11/21 with malaise, weakness, and cough. She tested positive for covid-19. She was recently diagnosed with Metastatic Pancreatic Cancer and had started Chemo Nov 17. She has lung nodules. Upon assessment Damon Martinez is minimally responsive. She is having deep labored breathing with the use of abdominal and chest muscles. RR 24. She is on 15L non-rebreather. She is warm to the touch. No mottling noted. Weak radial pulses. She has been medicated with Atvian 0.5mg x4, Morphine 2mg x4, and robinul 0.4mg x1 today. Despite medication she continues to have dyspnea and audible secretions. Spoke with [facility], APRN-CNP who accepted patient inpatient level of care hospice. Received bed 113 at [facility] with a requested transport time of ASAP.  PAS able to transport at 1330. Updated bedside nurse and charge nurse. Discharge order has been obtained. Nurse aware to leave IV's intact. Gave nurse to nurse report to hospice house.     Electronically signed by Elisha Sierra RN on 11/24/2021 at 11:36 AM

## 2021-11-27 LAB
BLOOD CULTURE, ROUTINE: ABNORMAL
ORGANISM: ABNORMAL

## 2021-11-28 LAB
EKG ATRIAL RATE: 74 BPM
EKG P AXIS: 55 DEGREES
EKG P-R INTERVAL: 178 MS
EKG Q-T INTERVAL: 432 MS
EKG QRS DURATION: 74 MS
EKG QTC CALCULATION (BAZETT): 479 MS
EKG R AXIS: -7 DEGREES
EKG T AXIS: 50 DEGREES
EKG VENTRICULAR RATE: 74 BPM

## 2021-12-07 NOTE — DISCHARGE SUMMARY
Admit Date: 10/27/2021  9:33 PM   Discharge Date: 10/31/2021    Patient Active Problem List   Diagnosis    Hyperlipidemia    Anxiety    Reactive depression    Hypothyroid    Grief reaction    NSTEMI (non-ST elevated myocardial infarction) ([address]    Pancreatic mass    Liver masses    Acute respiratory failure with hypoxia ([address]        Present on Admission:   NSTEMI (non-ST elevated myocardial infarction) ([address]   Pancreatic mass   Liver masses       @DISCHARGEMEDSLIST(<NOROUTINE> error)@     Hospital Course/Procedures:79year-old presented to the emergency room on 10/27/2021 with the main complaint of nausea. Troponins were in the 400s EKG was consistent with a non-STEMI. Unfortunately CAT scan of the abdomen also showed a pancreatic mass with multiple metastatic liver lesions and probable lung lesions. Patient was admitted to telemetry and placed on IV heparin. Of note she was previously on Eliquis for a DVT which was discontinued. Cardiology and surgery were consulted. Patient underwent nuclear stress testing which showed no signs of ischemia. Heparin was held and patient underwent liver biopsy which was positive for adenocarcinoma. Eliquis was restarted,patient was placed on a beta blocker. Oncology was consulted. They planned on following up with the patient as an outpatient. Patient was discharged in stable condition on 10/31/2021. She will have close followup with hematology oncology and her PCP.     Consultants Following:cardiology, general surgery, hematology/oncology and interventional radiology    Disposition:home    Follow-up:PCP, hematology/oncology      Hanley Scheuermann, MD  12/7/2021  9:09 AM

## 2023-04-05 NOTE — ED NOTES
Patient continues to sleep. Sp02 98%. Respirations easy, regular, without distress.      Tammy Dyson RN  11/21/21 8971 Cheek-To-Nose Interpolation Flap Text: A decision was made to reconstruct the defect utilizing an interpolation axial flap and a staged reconstruction.  A telfa template was made of the defect.  This telfa template was then used to outline the Cheek-To-Nose Interpolation flap.  The donor area for the pedicle flap was then injected with anesthesia.  The flap was excised through the skin and subcutaneous tissue down to the layer of the underlying musculature.  The interpolation flap was carefully excised within this deep plane to maintain its blood supply.  The edges of the donor site were undermined.   The donor site was closed in a primary fashion.  The pedicle was then rotated into position and sutured.  Once the tube was sutured into place, adequate blood supply was confirmed with blanching and refill.  The pedicle was then wrapped with xeroform gauze and dressed appropriately with a telfa and gauze bandage to ensure continued blood supply and protect the attached pedicle.
